# Patient Record
Sex: FEMALE | Race: WHITE | NOT HISPANIC OR LATINO | Employment: FULL TIME | URBAN - METROPOLITAN AREA
[De-identification: names, ages, dates, MRNs, and addresses within clinical notes are randomized per-mention and may not be internally consistent; named-entity substitution may affect disease eponyms.]

---

## 2017-11-13 ENCOUNTER — PATIENT OUTREACH (OUTPATIENT)
Dept: INTERNAL MEDICINE | Facility: CLINIC | Age: 32
End: 2017-11-13

## 2017-11-13 NOTE — PROGRESS NOTES
Dear Flora Edmond,     [facility].  Our records indicate that you are due for an annual checkup with your primary care provider,  Dr. Mery Schaeffer MD.  Please call [phone] to schedule a routine physical exam.  You can also schedule your appointment via your [facility]. You may also be due for the following test and/or procedures:    Health Maintenance Due   Topic Date Due    TETANUS VACCINE  08/13/2005    Pneumococcal PPSV23 (Medium Risk) (1) 08/13/2005    Influenza Vaccine  08/01/2017       You are more than welcome to schedule your visit with your PCP using our [facility].        If you have had any of the above done at another facility, please let us know by calling [phone]; so that we can update your record.  We will add these results to your chart if you fax them to the fax number listed below.  If you have any questions, please call [phone].    Thanks,       Additional Information  If you have questions, you can email [email or web address] or call [phone]  to talk to our [facility] staff. Remember, [facility] is NOT to be used for urgent needs. For medical emergencies, dial 911.

## 2018-02-26 DIAGNOSIS — Z30.09 GENERAL COUNSELING AND ADVICE FOR CONTRACEPTIVE MANAGEMENT: ICD-10-CM

## 2018-02-26 RX ORDER — LEVONORGESTREL AND ETHINYL ESTRADIOL 0.15-0.03
1 KIT ORAL DAILY
Qty: 91 TABLET | Refills: 0 | Status: SHIPPED | OUTPATIENT
Start: 2018-02-26 | End: 2018-05-08 | Stop reason: SDUPTHER

## 2018-03-19 ENCOUNTER — PATIENT OUTREACH (OUTPATIENT)
Dept: ADMINISTRATIVE | Facility: HOSPITAL | Age: 33
End: 2018-03-19

## 2018-03-19 NOTE — PROGRESS NOTES
[facility] is committed to your overall health.  To help you get the most out of each of your visits, we will review your information to make sure you are up to date on all of your recommended tests and/or procedures.       Your PCP  Mery Schaeffer MD   found that you may be due for:       Health Maintenance Due   Topic Date Due    TETANUS VACCINE  08/13/2005    Influenza Vaccine  08/01/2017             If you have had any of the above done at another facility, please bring the records or information with you so that your record at [facility] will be complete.  If you would like to schedule any of these, please contact me.     If you are currently taking medication, please bring it with you to your appointment for review.     Also, if you have any type of Advanced Directives, please bring them with you to your office visit so we may scan them into your chart.       Thank you for Choosing [facility] for your healthcare needs.        Additional Information  If you have questions, you can email [email or web address] or call [phone]  to talk to our [facility] staff. Remember, [facility] is NOT to be used for urgent needs. For medical emergencies, dial 911.

## 2018-03-20 ENCOUNTER — OFFICE VISIT (OUTPATIENT)
Dept: INTERNAL MEDICINE | Facility: CLINIC | Age: 33
End: 2018-03-20
Attending: INTERNAL MEDICINE
Payer: COMMERCIAL

## 2018-03-20 ENCOUNTER — HOSPITAL ENCOUNTER (OUTPATIENT)
Dept: RADIOLOGY | Facility: OTHER | Age: 33
Discharge: HOME | End: 2018-03-20
Attending: INTERNAL MEDICINE
Payer: COMMERCIAL

## 2018-03-20 VITALS
OXYGEN SATURATION: 96 % | WEIGHT: 147.5 LBS | DIASTOLIC BLOOD PRESSURE: 60 MMHG | HEIGHT: 65 IN | SYSTOLIC BLOOD PRESSURE: 121 MMHG | BODY MASS INDEX: 24.57 KG/M2 | HEART RATE: 78 BPM

## 2018-03-20 DIAGNOSIS — M85.80 OSTEOPENIA, UNSPECIFIED LOCATION: ICD-10-CM

## 2018-03-20 DIAGNOSIS — Q76.5 CERVICAL RIB: ICD-10-CM

## 2018-03-20 DIAGNOSIS — J30.2 CHRONIC SEASONAL ALLERGIC RHINITIS, UNSPECIFIED TRIGGER: ICD-10-CM

## 2018-03-20 DIAGNOSIS — Z00.00 ANNUAL PHYSICAL EXAM: Primary | ICD-10-CM

## 2018-03-20 DIAGNOSIS — R20.0 ARM NUMBNESS: ICD-10-CM

## 2018-03-20 DIAGNOSIS — L98.9 SKIN LESION: ICD-10-CM

## 2018-03-20 DIAGNOSIS — Z12.83 SKIN EXAM, SCREENING FOR CANCER: ICD-10-CM

## 2018-03-20 DIAGNOSIS — Z11.3 SCREENING EXAMINATION FOR STD (SEXUALLY TRANSMITTED DISEASE): ICD-10-CM

## 2018-03-20 DIAGNOSIS — R42 VERTIGO: ICD-10-CM

## 2018-03-20 DIAGNOSIS — Z83.2 FAMILY HISTORY OF HEMOPHILIA B: ICD-10-CM

## 2018-03-20 DIAGNOSIS — E55.9 VITAMIN D DEFICIENCY: ICD-10-CM

## 2018-03-20 DIAGNOSIS — Z83.2 FAMILY HISTORY OF BLOOD DISORDER: ICD-10-CM

## 2018-03-20 LAB
B-HCG UR QL: NEGATIVE
CTP QC/QA: YES

## 2018-03-20 PROCEDURE — 72050 X-RAY EXAM NECK SPINE 4/5VWS: CPT | Mod: TC,FY

## 2018-03-20 PROCEDURE — 99395 PREV VISIT EST AGE 18-39: CPT | Mod: S$GLB,,, | Performed by: INTERNAL MEDICINE

## 2018-03-20 PROCEDURE — 87491 CHLMYD TRACH DNA AMP PROBE: CPT

## 2018-03-20 PROCEDURE — 72050 X-RAY EXAM NECK SPINE 4/5VWS: CPT | Mod: 26,,, | Performed by: RADIOLOGY

## 2018-03-20 PROCEDURE — 99999 PR PBB SHADOW E&M-EST. PATIENT-LVL V: CPT | Mod: PBBFAC,,, | Performed by: INTERNAL MEDICINE

## 2018-03-20 PROCEDURE — 81025 URINE PREGNANCY TEST: CPT | Mod: S$GLB,,, | Performed by: INTERNAL MEDICINE

## 2018-03-20 RX ORDER — MECLIZINE HYDROCHLORIDE 25 MG/1
25 TABLET ORAL 3 TIMES DAILY PRN
Qty: 30 TABLET | Refills: 0 | Status: SHIPPED | OUTPATIENT
Start: 2018-03-20 | End: 2022-06-28

## 2018-03-20 RX ORDER — LORATADINE 10 MG/1
10 TABLET ORAL DAILY
Refills: 0 | COMMUNITY
Start: 2018-03-20 | End: 2022-07-14

## 2018-03-20 NOTE — PATIENT INSTRUCTIONS
Over the counter anti-histamine like zyrtec daily along with twice daily nasal saline rinses (oceans nasal saline 3-4 sprays per nostril twice daily) and gently blow nose followed by flonase 1 spray twice daily for 1 month to look for improvement. Flonase takes about 1 week to start working.

## 2018-03-20 NOTE — PROGRESS NOTES
"Subjective:   Patient ID: Flora Edmond is a 32 y.o. female  Chief complaint:   Chief Complaint   Patient presents with    Annual Exam       HPI  Here for annual exam    Reports had episode of vertigo - triggered by rollingng to right side - "room spins" for a few seconds and then is self resolving. Sx Intermittent x 1 day  Looked up eply maneuver and this was helpful   Chronic sinus sx - no ear pain, bilateral pressure   Takes claritin D   No ear pain or pressure. Hx of deafness of right ear since birth    Family hx of hemophilia B - reports both siblings were tested and she is requesting testing for this - denies heavy periods. Does have hx of dry mouth and has gum bleeding at times. No easy bruising.     Hx of cervical rib - right - reports gets numbness of right arm > left  No neck pain.   Reports at times if stretches will feel lightheaded - reports pulse disappears if lifts right arm. Is concerned about thoracic outlet syndrome.   No hx of cyanosis, arm edema or UE clots    Hx of vit d - treated with Rx in past - taking otc intermittently at this time   Bone density checked after volunteered as part of a study from [facility] Jan 2015 showed osteopenia - pt brought report today   T score fem neck was -2  Tscore  L spine - 1.9  On OCPs x 15 years     Review of Systems    Objective:  Vitals:    03/20/18 1314   BP: 121/60   Pulse: 78   SpO2: 96%   Weight: 66.9 kg (147 lb 7.8 oz)   Height: 5' 5" (1.651 m)     Body mass index is 24.54 kg/m².    Physical Exam   Constitutional: She is oriented to person, place, and time. She appears well-developed and well-nourished.   HENT:   Head: Normocephalic and atraumatic.   Right Ear: External ear normal.   Left Ear: External ear normal.   Nose: Nose normal.   Mouth/Throat: Oropharynx is clear and moist. No oropharyngeal exudate.   No carotid bruits   Eyes: Conjunctivae and EOM are normal.   Neck: Neck supple. No thyromegaly present.   Cardiovascular: Normal rate, regular " rhythm, normal heart sounds and intact distal pulses.    Pulmonary/Chest: Effort normal and breath sounds normal.   Abdominal: Soft. Bowel sounds are normal.   Musculoskeletal: She exhibits no edema or tenderness.   No UE cyanosis or edema  Sensation to light touch in tact B UE  No spinal ttp  5/5 strength B UE     Lymphadenopathy:     She has no cervical adenopathy.   Neurological: She is alert and oriented to person, place, and time.   Skin: Skin is warm and dry. Capillary refill takes less than 2 seconds.   Psychiatric: Her behavior is normal. Thought content normal.   Vitals reviewed.      Assessment:  1. Annual physical exam    2. Screening examination for STD (sexually transmitted disease)    3. Vitamin D deficiency    4. Chronic seasonal allergic rhinitis, unspecified trigger    5. Family history of blood disorder    6. Family history of hemophilia B    7. Skin exam, screening for cancer    8. Skin lesion    9. Cervical rib    10. Arm numbness    11. Osteopenia, unspecified location    12. Vertigo        Plan:  Flora was seen today for annual exam.    Diagnoses and all orders for this visit:    Annual physical exam  -     POCT Urine Pregnancy  -     C. trachomatis/N. gonorrhoeae by AMP DNA Urine  -     CBC auto differential; Future  -     Comprehensive metabolic panel; Future  -     TSH; Future  -     RPR; Future  -     HIV-1 and HIV-2 antibodies; Future  -     Vitamin D; Future    Screening examination for STD (sexually transmitted disease)  -     POCT Urine Pregnancy  -     C. trachomatis/N. gonorrhoeae by AMP DNA Urine  -     RPR; Future  -     HIV-1 and HIV-2 antibodies; Future    Vitamin D deficiency    Chronic seasonal allergic rhinitis, unspecified trigger  -     loratadine (CLARITIN) 10 mg tablet; Take 1 tablet (10 mg total) by mouth once daily.    Family history of blood disorder  -     Ambulatory Referral to Hematology / Oncology    Family history of hemophilia B  -     Ambulatory Referral to  Hematology / Oncology    Skin exam, screening for cancer  -     Ambulatory Referral to Dermatology    Skin lesion  -     Ambulatory Referral to Dermatology    Cervical rib  -     Ambulatory consult to Cardiothoracic Surgery    Arm numbness  -     Ambulatory Referral to Neurology  -     Ambulatory consult to Cardiothoracic Surgery  -     X-Ray Cervical Spine Complete 5 view; Future    Osteopenia, unspecified location  -     DXA Bone Density Spine And Hip; Future    Vertigo  -     Ambulatory Referral to ENT    Other orders  -     meclizine (ANTIVERT) 25 mg tablet; Take 1 tablet (25 mg total) by mouth 3 (three) times daily as needed.    Er and rTc prompts     Health Maintenance   Topic Date Due    TETANUS VACCINE  08/13/2005    Pneumococcal PPSV23 (Medium Risk) (1) 08/13/2005    Influenza Vaccine  08/01/2017    Pap Smear with HPV Cotest  12/07/2019    Lipid Panel  Completed

## 2018-03-21 LAB
C TRACH DNA SPEC QL NAA+PROBE: NOT DETECTED
N GONORRHOEA DNA SPEC QL NAA+PROBE: NOT DETECTED

## 2018-03-22 ENCOUNTER — TELEPHONE (OUTPATIENT)
Dept: INTERNAL MEDICINE | Facility: CLINIC | Age: 33
End: 2018-03-22

## 2018-03-22 DIAGNOSIS — E55.9 VITAMIN D DEFICIENCY: Primary | ICD-10-CM

## 2018-03-22 RX ORDER — ACETAMINOPHEN 500 MG
1 TABLET ORAL DAILY
COMMUNITY
Start: 2018-03-22 | End: 2022-07-14

## 2018-03-22 RX ORDER — ERGOCALCIFEROL 1.25 MG/1
50000 CAPSULE ORAL
Qty: 12 CAPSULE | Refills: 0 | Status: SHIPPED | OUTPATIENT
Start: 2018-03-22 | End: 2018-05-08

## 2018-03-27 ENCOUNTER — HOSPITAL ENCOUNTER (OUTPATIENT)
Dept: RADIOLOGY | Facility: OTHER | Age: 33
Discharge: HOME | End: 2018-03-27
Attending: INTERNAL MEDICINE
Payer: COMMERCIAL

## 2018-03-27 DIAGNOSIS — M85.80 OSTEOPENIA, UNSPECIFIED LOCATION: ICD-10-CM

## 2018-03-27 PROCEDURE — 77080 DXA BONE DENSITY AXIAL: CPT | Mod: 26,,, | Performed by: RADIOLOGY

## 2018-03-27 PROCEDURE — 77080 DXA BONE DENSITY AXIAL: CPT | Mod: TC

## 2018-04-09 ENCOUNTER — TELEPHONE (OUTPATIENT)
Dept: INTERNAL MEDICINE | Facility: CLINIC | Age: 33
End: 2018-04-09

## 2018-05-08 ENCOUNTER — OFFICE VISIT (OUTPATIENT)
Dept: OBSTETRICS AND GYNECOLOGY | Facility: CLINIC | Age: 33
End: 2018-05-08
Payer: COMMERCIAL

## 2018-05-08 ENCOUNTER — TELEPHONE (OUTPATIENT)
Dept: UROGYNECOLOGY | Facility: CLINIC | Age: 33
End: 2018-05-08

## 2018-05-08 VITALS
WEIGHT: 147.44 LBS | DIASTOLIC BLOOD PRESSURE: 70 MMHG | BODY MASS INDEX: 24.57 KG/M2 | HEIGHT: 65 IN | SYSTOLIC BLOOD PRESSURE: 110 MMHG

## 2018-05-08 DIAGNOSIS — Z72.0 TOBACCO ABUSE: ICD-10-CM

## 2018-05-08 DIAGNOSIS — Z30.09 GENERAL COUNSELING AND ADVICE FOR CONTRACEPTIVE MANAGEMENT: ICD-10-CM

## 2018-05-08 DIAGNOSIS — K64.9 HEMORRHOIDS, UNSPECIFIED HEMORRHOID TYPE: ICD-10-CM

## 2018-05-08 DIAGNOSIS — Z12.4 ENCOUNTER FOR PAPANICOLAOU SMEAR FOR CERVICAL CANCER SCREENING: ICD-10-CM

## 2018-05-08 DIAGNOSIS — Z71.6 ENCOUNTER FOR SMOKING CESSATION COUNSELING: ICD-10-CM

## 2018-05-08 DIAGNOSIS — Z11.3 SCREEN FOR STD (SEXUALLY TRANSMITTED DISEASE): ICD-10-CM

## 2018-05-08 DIAGNOSIS — N32.81 OVERACTIVE BLADDER: ICD-10-CM

## 2018-05-08 DIAGNOSIS — Z01.419 WOMEN'S ANNUAL ROUTINE GYNECOLOGICAL EXAMINATION: Primary | ICD-10-CM

## 2018-05-08 DIAGNOSIS — B00.9 HSV-1 (HERPES SIMPLEX VIRUS 1) INFECTION: ICD-10-CM

## 2018-05-08 DIAGNOSIS — Z11.51 SCREENING FOR HPV (HUMAN PAPILLOMAVIRUS): ICD-10-CM

## 2018-05-08 LAB
CANDIDA RRNA VAG QL PROBE: NEGATIVE
G VAGINALIS RRNA GENITAL QL PROBE: NEGATIVE
T VAGINALIS RRNA GENITAL QL PROBE: NEGATIVE

## 2018-05-08 PROCEDURE — 88175 CYTOPATH C/V AUTO FLUID REDO: CPT

## 2018-05-08 PROCEDURE — 99395 PREV VISIT EST AGE 18-39: CPT | Mod: S$GLB,,, | Performed by: NURSE PRACTITIONER

## 2018-05-08 PROCEDURE — 87480 CANDIDA DNA DIR PROBE: CPT

## 2018-05-08 PROCEDURE — 87086 URINE CULTURE/COLONY COUNT: CPT

## 2018-05-08 PROCEDURE — 87510 GARDNER VAG DNA DIR PROBE: CPT

## 2018-05-08 PROCEDURE — 99999 PR PBB SHADOW E&M-EST. PATIENT-LVL III: CPT | Mod: PBBFAC,,, | Performed by: NURSE PRACTITIONER

## 2018-05-08 PROCEDURE — 87624 HPV HI-RISK TYP POOLED RSLT: CPT

## 2018-05-08 RX ORDER — LEVONORGESTREL AND ETHINYL ESTRADIOL 0.15-0.03
1 KIT ORAL DAILY
Qty: 91 TABLET | Refills: 3 | Status: SHIPPED | OUTPATIENT
Start: 2018-05-08 | End: 2018-08-06

## 2018-05-08 RX ORDER — HYDROCORTISONE ACETATE 25 MG/1
25 SUPPOSITORY RECTAL 2 TIMES DAILY
Qty: 6 SUPPOSITORY | Refills: 1 | Status: SHIPPED | OUTPATIENT
Start: 2018-05-08 | End: 2019-05-08

## 2018-05-08 RX ORDER — VALACYCLOVIR HYDROCHLORIDE 1 G/1
1000 TABLET, FILM COATED ORAL DAILY
Qty: 30 TABLET | Refills: 11 | Status: SHIPPED | OUTPATIENT
Start: 2018-05-08 | End: 2022-08-10 | Stop reason: SDUPTHER

## 2018-05-08 NOTE — TELEPHONE ENCOUNTER
----- Message from Danielle Dominique MA sent at 5/8/2018  9:39 AM CDT -----            Name of Who is Calling: Flora Edmond      What is the request in detail: Pt is needing a appt for overactive bladder      Can the clinic reply by [facility]: No      What Number to Call Back if not in [facility]: [phone]

## 2018-05-08 NOTE — TELEPHONE ENCOUNTER
Spoke with pt and scheduled a consult appointment on 5/17/18, appointment letter placed in the mail, pt voiced understanding and call was ended.

## 2018-05-08 NOTE — PROGRESS NOTES
CC: Annual  HPI: Pt is a 32 y.o.  female who presents for routine annual exam. She works at [employer] for [employer].  She uses OCPs for contraception. Pt is a smoker.  She has no plans to quit. She does want STD screening- Affirm only  She recently had STD screening per PCP. Pt reports overactive bladder for the past 1 year. She was seen in Urgent Care several months ago and was negative for UTI.  Denies any frequency, dysuria, back pain, or fever. Pt is also c/o hemorrhoids.  States she has had them since ae 23.  Pt is requesting refill for Valtrex for HSV1. Reports + family history of breast cancer- mother (age 72 at diagnosis)- mother had triple negative for genetic screening. Reports + family history of prostate cancer- father, paternal grandfather, and paternal uncle.    .     ROS:  GENERAL: Feeling well overall. Denies fever or chills.   SKIN: Denies rash or lesions.   HEAD: Denies head injury or headache.   NODES: Denies enlarged lymph nodes.   CHEST: Denies chest pain or shortness of breath.   CARDIOVASCULAR: Denies palpitations or left sided chest pain.   ABDOMEN: No abdominal pain, constipation, diarrhea, nausea, vomiting or rectal bleeding.   URINARY: No dysuria, hematuria, or burning on urination.  REPRODUCTIVE: See HPI.   BREASTS: Denies pain, lumps, or nipple discharge.   HEMATOLOGIC: No easy bruisability or excessive bleeding.   MUSCULOSKELETAL: Denies joint pain or swelling.   NEUROLOGIC: Denies syncope or weakness.   PSYCHIATRIC: Denies depression, anxiety or mood swings.    PE:   APPEARANCE: Well nourished, well developed, White female in no acute distress.  NODES: no cervical, supraclavicular, or inguinal lymphadenopathy  BREASTS: Symmetrical, no skin changes or visible lesions. No palpable masses, nipple discharge or adenopathy bilaterally.  ABDOMEN: Soft. No tenderness or masses. No distention. No hernias palpated. No CVA tenderness.  VULVA: No lesions. Normal external female  genitalia.  URETHRAL MEATUS: Normal size and location, no lesions, no prolapse.  URETHRA: No masses, tenderness, or prolapse.  VAGINA: Moist. No lesions or lacerations noted. No abnormal discharge present. No odor present.   CERVIX: No lesions or discharge. No cervical motion tenderness.   UTERUS: Normal size, regular shape, mobile, non-tender.  ADNEXA: No tenderness. No fullness or masses palpated in the adnexal regions.   ANUS PERINEUM: Normal.      Diagnosis:  1. Women's annual routine gynecological examination    2. Hemorrhoids, unspecified hemorrhoid type    3. Screen for STD (sexually transmitted disease)    4. General counseling and advice for contraceptive management    5. Encounter for Papanicolaou smear for cervical cancer screening    6. Screening for HPV (human papillomavirus)    7. HSV-1 (herpes simplex virus 1) infection    8. Tobacco abuse    9. Encounter for smoking cessation counseling        Plan:   Pap/ HPV  Affirm  OCPs refilled  Discussed smoking cessation, plan for quit date.  Pt not currently ready to quit.  Discussed smoking increased risk of thromboembolic events while taking OCPs.  Discussed after age 35-if is still smoking OCPs will be contraindicated.   Valtrex refilled  Anusol suppositories for hemorrhoids.  Discussed Witch Hazel PRN  Referral to UroGyn for evaluation of overactive bladder  Urine Culture    Orders Placed This Encounter    HPV High Risk Genotypes, PCR    Vaginosis Screen by DNA Probe    Liquid-based pap smear, screening    levonorgestrel-ethinyl estradiol (SETLAKIN) 0.15 mg-30 mcg per tablet    hydrocortisone (ANUSOL-HC) 25 mg suppository    valACYclovir (VALTREX) 1000 MG tablet       Patient was counseled today on the new ACS guidelines for cervical cytology screening as well as the current recommendations for breast cancer screening. She was counseled to follow up with her PCP for other routine health maintenance. Counseling session lasted approximately 10  minutes, and all her questions were answered.    Follow-up with me in 1 year for routine exam

## 2018-05-09 LAB — BACTERIA UR CULT: NORMAL

## 2018-05-11 LAB
HPV16 AG SPEC QL: NEGATIVE
HPV16+18+H RISK 12 DNA CVX-IMP: NEGATIVE
HPV18 DNA SPEC QL NAA+PROBE: NEGATIVE

## 2018-05-14 ENCOUNTER — PATIENT MESSAGE (OUTPATIENT)
Dept: OBSTETRICS AND GYNECOLOGY | Facility: CLINIC | Age: 33
End: 2018-05-14

## 2018-06-17 ENCOUNTER — PATIENT MESSAGE (OUTPATIENT)
Dept: OBSTETRICS AND GYNECOLOGY | Facility: CLINIC | Age: 33
End: 2018-06-17

## 2018-09-07 ENCOUNTER — OFFICE VISIT (OUTPATIENT)
Dept: OBSTETRICS AND GYNECOLOGY | Facility: CLINIC | Age: 33
End: 2018-09-07
Payer: COMMERCIAL

## 2018-09-07 VITALS
HEIGHT: 65 IN | DIASTOLIC BLOOD PRESSURE: 75 MMHG | SYSTOLIC BLOOD PRESSURE: 118 MMHG | WEIGHT: 147.5 LBS | BODY MASS INDEX: 24.57 KG/M2

## 2018-09-07 DIAGNOSIS — N92.6 IRREGULAR BLEEDING: Primary | ICD-10-CM

## 2018-09-07 LAB
B-HCG UR QL: NEGATIVE
CTP QC/QA: YES

## 2018-09-07 PROCEDURE — 99214 OFFICE O/P EST MOD 30 MIN: CPT | Mod: S$GLB,,, | Performed by: OBSTETRICS & GYNECOLOGY

## 2018-09-07 PROCEDURE — 87480 CANDIDA DNA DIR PROBE: CPT

## 2018-09-07 PROCEDURE — 87510 GARDNER VAG DNA DIR PROBE: CPT

## 2018-09-07 PROCEDURE — 99999 PR PBB SHADOW E&M-EST. PATIENT-LVL III: CPT | Mod: PBBFAC,,, | Performed by: OBSTETRICS & GYNECOLOGY

## 2018-09-07 PROCEDURE — 81025 URINE PREGNANCY TEST: CPT | Mod: S$GLB,,, | Performed by: OBSTETRICS & GYNECOLOGY

## 2018-09-07 PROCEDURE — 87491 CHLMYD TRACH DNA AMP PROBE: CPT

## 2018-09-07 PROCEDURE — 3008F BODY MASS INDEX DOCD: CPT | Mod: CPTII,S$GLB,, | Performed by: OBSTETRICS & GYNECOLOGY

## 2018-09-07 NOTE — PROGRESS NOTES
Gynecology    SUBJECTIVE:     Chief Complaint: Menorrhagia (had cramps in lower pelvic area)       History of Present Illness:  33 year old with irregular bleeding.  Has been on seasonale for about 10 years.  Takes three months at a time.  Had some bleeding between periods in June/July during the hormone pills.  She would bleed twice a week for 6-8 weeks.  Some days she had spotting, but other days she required a light tampon.  Also had cramping during that time as well.  Then, she had her period during the placebo week in August, but this cycle was very heavy.  She had three days of using 5-6 tampons per day- for her the heaviest cycle that she's had.  She had some cramping in her rectum during this time.  Started her pills again after this.  Bleeding has since resolved, except with masturbation and orgasm after which she has spotting. Only clitoral stimulation for masturbation, nothing internal.  Has not had intercourse since the unusual bleeding, but is scared to because she feels like she might bleed again.        Review of Systems:  Review of Systems   Constitutional: Negative for appetite change, fever and unexpected weight change.   Respiratory: Negative for shortness of breath.    Cardiovascular: Negative for chest pain.   Gastrointestinal: Negative for nausea and vomiting.   Genitourinary: Positive for menstrual problem. Negative for menorrhagia, pelvic pain, vaginal bleeding, vaginal discharge and vaginal pain.        OBJECTIVE:     Physical Exam:  Physical Exam   Constitutional: She is oriented to person, place, and time. She appears well-developed and well-nourished.   Pulmonary/Chest: Effort normal.   Abdominal: Soft.   Genitourinary: Vagina normal and uterus normal. No labial fusion. There is no rash, tenderness, lesion or injury on the right labia. There is no rash, tenderness, lesion or injury on the left labia. Uterus is not deviated, not enlarged, not fixed and not tender. Cervix exhibits discharge  and friability. Cervix exhibits no motion tenderness. Right adnexum displays no mass, no tenderness and no fullness. Left adnexum displays no mass, no tenderness and no fullness. No erythema, tenderness or bleeding in the vagina. No foreign body in the vagina. No signs of injury around the vagina. No vaginal discharge found.   Genitourinary Comments: Urethra: normal appearing urethra with no masses, tenderness or lesions  Urethral meatus: normal size, anterior vaginal wall with no prolapse, no lesions    Cervix- somewhat friable on exam; prominent ectropion   Neurological: She is alert and oriented to person, place, and time.   Psychiatric: She has a normal mood and affect. Her behavior is normal. Judgment and thought content normal.   Nursing note and vitals reviewed.      Chaperoned by: hesham    ASSESSMENT:       ICD-10-CM ICD-9-CM    1. Irregular bleeding N92.6 626.4 POCT urine pregnancy      C. trachomatis/N. gonorrhoeae by AMP DNA      Vaginosis Screen by DNA Probe          Plan:      Flora was seen today for menorrhagia.    Diagnoses and all orders for this visit:    Irregular bleeding  -     POCT urine pregnancy  -     C. trachomatis/N. gonorrhoeae by AMP DNA  -     Vaginosis Screen by DNA Probe    - vaginal screen and gc/ct today; patient does have some discharge on exam; prominent ectropion;   - consider treatment for cervical friability; wait results of above tests  - if becomes a longterm problem, consider uterine polyp or change of OCP    Orders Placed This Encounter   Procedures    C. trachomatis/N. gonorrhoeae by AMP DNA    Vaginosis Screen by DNA Probe    POCT urine pregnancy       Follow-up for annual or prn.    Yaima Khalil

## 2018-09-08 LAB
C TRACH DNA SPEC QL NAA+PROBE: NOT DETECTED
N GONORRHOEA DNA SPEC QL NAA+PROBE: NOT DETECTED

## 2018-09-11 ENCOUNTER — PATIENT MESSAGE (OUTPATIENT)
Dept: OBSTETRICS AND GYNECOLOGY | Facility: CLINIC | Age: 33
End: 2018-09-11

## 2019-04-12 ENCOUNTER — PATIENT MESSAGE (OUTPATIENT)
Dept: INTERNAL MEDICINE | Facility: CLINIC | Age: 34
End: 2019-04-12

## 2019-04-15 RX ORDER — DOXYCYCLINE HYCLATE 100 MG
100 TABLET ORAL DAILY
Qty: 45 TABLET | Refills: 0 | Status: SHIPPED | OUTPATIENT
Start: 2019-04-15 | End: 2019-05-30

## 2019-06-26 ENCOUNTER — OFFICE VISIT (OUTPATIENT)
Dept: OBSTETRICS AND GYNECOLOGY | Facility: CLINIC | Age: 34
End: 2019-06-26
Attending: OBSTETRICS & GYNECOLOGY
Payer: COMMERCIAL

## 2019-06-26 ENCOUNTER — LAB VISIT (OUTPATIENT)
Dept: LAB | Facility: OTHER | Age: 34
End: 2019-06-26
Attending: OBSTETRICS & GYNECOLOGY
Payer: COMMERCIAL

## 2019-06-26 ENCOUNTER — PATIENT MESSAGE (OUTPATIENT)
Dept: OBSTETRICS AND GYNECOLOGY | Facility: CLINIC | Age: 34
End: 2019-06-26

## 2019-06-26 VITALS
WEIGHT: 146.63 LBS | DIASTOLIC BLOOD PRESSURE: 82 MMHG | BODY MASS INDEX: 24.43 KG/M2 | SYSTOLIC BLOOD PRESSURE: 120 MMHG | HEIGHT: 65 IN

## 2019-06-26 DIAGNOSIS — N90.89 VULVAR LESION: ICD-10-CM

## 2019-06-26 DIAGNOSIS — N92.6 IRREGULAR PERIODS/MENSTRUAL CYCLES: Primary | ICD-10-CM

## 2019-06-26 PROCEDURE — 99999 PR PBB SHADOW E&M-EST. PATIENT-LVL III: ICD-10-PCS | Mod: PBBFAC,,, | Performed by: OBSTETRICS & GYNECOLOGY

## 2019-06-26 PROCEDURE — 3008F PR BODY MASS INDEX (BMI) DOCUMENTED: ICD-10-PCS | Mod: CPTII,S$GLB,, | Performed by: OBSTETRICS & GYNECOLOGY

## 2019-06-26 PROCEDURE — 3008F BODY MASS INDEX DOCD: CPT | Mod: CPTII,S$GLB,, | Performed by: OBSTETRICS & GYNECOLOGY

## 2019-06-26 PROCEDURE — 36415 COLL VENOUS BLD VENIPUNCTURE: CPT

## 2019-06-26 PROCEDURE — 99999 PR PBB SHADOW E&M-EST. PATIENT-LVL III: CPT | Mod: PBBFAC,,, | Performed by: OBSTETRICS & GYNECOLOGY

## 2019-06-26 PROCEDURE — 99213 OFFICE O/P EST LOW 20 MIN: CPT | Mod: S$GLB,,, | Performed by: OBSTETRICS & GYNECOLOGY

## 2019-06-26 PROCEDURE — 86696 HERPES SIMPLEX TYPE 2 TEST: CPT

## 2019-06-26 PROCEDURE — 99213 PR OFFICE/OUTPT VISIT, EST, LEVL III, 20-29 MIN: ICD-10-PCS | Mod: S$GLB,,, | Performed by: OBSTETRICS & GYNECOLOGY

## 2019-06-26 RX ORDER — DICLOFENAC SODIUM AND MISOPROSTOL 75; 200 MG/1; UG/1
1 TABLET, DELAYED RELEASE ORAL DAILY
Qty: 2 TABLET | Refills: 0 | Status: SHIPPED | OUTPATIENT
Start: 2019-06-26 | End: 2019-08-06

## 2019-06-26 NOTE — PROGRESS NOTES
HISTORY OF PRESENT ILLNESS:    Flora Edmond is a 33 y.o. female, , No LMP recorded. (Menstrual status: Birth Control).,  presents for irregular cycles.    Started OCPs age 16 for irregular cycles and contraception.  Started seasonale in her 20s to skip her cycle - been on that for years.  About a year ago started having breakthrough bleeding over the course of the 3 mos, especially if she missed a pill.   Also starts bleeding if she has an orgasm.  Cycles about the same - lasting 4 days, normal flow.      Previously smoked cigarettes, switched to electronic Juul about a year ago.    3/19 TSH normal.    Patient with history of HSV-1 on genital swab .  No other outbreaks.  Wants to check blood work.    Past Medical History:   Diagnosis Date    Cervical rib     Vitamin D deficiency        Past Surgical History:   Procedure Laterality Date    WISDOM TOOTH EXTRACTION         MEDICATIONS AND ALLERGIES:      Current Outpatient Medications:     cholecalciferol, vitamin D3, (VITAMIN D3) 2,000 unit Cap, Take 1 capsule (2,000 Units total) by mouth once daily., Disp: , Rfl:     INTROVALE 0.15 mg-30 mcg per tablet, TAKE 1 TABLET BY MOUTH EVERY DAY, Disp: 91 tablet, Rfl: 0    loratadine (CLARITIN) 10 mg tablet, Take 1 tablet (10 mg total) by mouth once daily., Disp: , Rfl: 0    meclizine (ANTIVERT) 25 mg tablet, Take 1 tablet (25 mg total) by mouth 3 (three) times daily as needed., Disp: 30 tablet, Rfl: 0    valACYclovir (VALTREX) 1000 MG tablet, Take 1 tablet (1,000 mg total) by mouth once daily., Disp: 30 tablet, Rfl: 11    diclofenac-misoprostol  mg-mcg (ARTHROTEC 75)  mg-mcg per tablet, Take 1 tablet by mouth once daily. Take 1st dose at 8pm the night before the procedure, 2nd dose the morning of the procedure for 2 days, Disp: 2 tablet, Rfl: 0    Review of patient's allergies indicates:  No Known Allergies    Family History   Problem Relation Age of Onset    Thyroid disease Mother      Breast cancer Mother     Hyperlipidemia Father     Coronary artery disease Father     No Known Problems Sister     Breast cancer Maternal Grandmother     Prostate cancer Paternal Uncle     Colon cancer Neg Hx     Ovarian cancer Neg Hx        Social History     Socioeconomic History    Marital status: Single     Spouse name: Not on file    Number of children: Not on file    Years of education: Not on file    Highest education level: Not on file   Occupational History    Not on file   Social Needs    Financial resource strain: Not on file    Food insecurity:     Worry: Not on file     Inability: Not on file    Transportation needs:     Medical: Not on file     Non-medical: Not on file   Tobacco Use    Smoking status: Current Every Day Smoker     Packs/day: 0.50     Years: 15.00     Pack years: 7.50     Types: Cigarettes    Smokeless tobacco: Never Used   Substance and Sexual Activity    Alcohol use: Yes     Comment: social    Drug use: No    Sexual activity: Yes     Partners: Male     Birth control/protection: OCP   Lifestyle    Physical activity:     Days per week: Not on file     Minutes per session: Not on file    Stress: Not on file   Relationships    Social connections:     Talks on phone: Not on file     Gets together: Not on file     Attends Sikhism service: Not on file     Active member of club or organization: Not on file     Attends meetings of clubs or organizations: Not on file     Relationship status: Not on file   Other Topics Concern    Not on file   Social History Narrative    Graduated with masters in public health in May 2015    Currently seeking employment    Working at [email or web address] as  in [employer]    Originally from [place]    Moved to [facility] in [place], lives alone           ROS:  GENERAL: No weight changes. No swelling. No fatigue. No fever.  CARDIOVASCULAR: No chest pain. No shortness of breath. No leg cramps.   NEUROLOGICAL: No headaches. No vision  "changes.  BREASTS: No pain. No lumps. No discharge.  ABDOMEN: No pain. No nausea. No vomiting. No diarrhea. No constipation.  REPRODUCTIVE: see above  VULVA: No pain. No lesions. No itching.  VAGINA: No relaxation. No itching. No odor. No discharge. No lesions.  URINARY: No incontinence. No nocturia. No frequency. No dysuria.    /82 (BP Location: Left arm, Patient Position: Sitting, BP Method: Medium (Manual))   Ht 5' 5" (1.651 m)   Wt 66.5 kg (146 lb 9.7 oz)   BMI 24.40 kg/m²     PE:  APPEARANCE: Well nourished, well developed, in no acute distress.  PSYCH:  Appropriate mood and affect  EXTREMITIES: No edema      DIAGNOSIS & PLAN  1. Irregular periods/menstrual cycles  US Pelvis Complete Non OB   2. Vulvar lesion  Herpes Simplex Virus (HSV) Types 1 & 2, IgG, Herpes Titer       COUNSELING:  Discussed contraceptive options.  Patient counseled - will check mirena coverage.  Patient will check gardasil coverage  "

## 2019-06-28 LAB
HSV1 IGG SERPL QL IA: POSITIVE
HSV2 IGG SERPL QL IA: NEGATIVE

## 2019-07-01 ENCOUNTER — CLINICAL SUPPORT (OUTPATIENT)
Dept: OBSTETRICS AND GYNECOLOGY | Facility: CLINIC | Age: 34
End: 2019-07-01
Payer: COMMERCIAL

## 2019-07-01 ENCOUNTER — HOSPITAL ENCOUNTER (OUTPATIENT)
Dept: RADIOLOGY | Facility: OTHER | Age: 34
Discharge: HOME | End: 2019-07-01
Attending: OBSTETRICS & GYNECOLOGY
Payer: COMMERCIAL

## 2019-07-01 DIAGNOSIS — N92.6 IRREGULAR PERIODS/MENSTRUAL CYCLES: ICD-10-CM

## 2019-07-01 DIAGNOSIS — Z23 NEED FOR HPV VACCINATION: Primary | ICD-10-CM

## 2019-07-01 PROCEDURE — 90471 HPV VACCINE 9-VALENT 3 DOSE IM: ICD-10-PCS | Mod: S$GLB,,, | Performed by: OBSTETRICS & GYNECOLOGY

## 2019-07-01 PROCEDURE — 90471 IMMUNIZATION ADMIN: CPT | Mod: S$GLB,,, | Performed by: OBSTETRICS & GYNECOLOGY

## 2019-07-01 PROCEDURE — 76830 US PELVIS COMP WITH TRANSVAG NON-OB (XPD): ICD-10-PCS | Mod: 26,,, | Performed by: RADIOLOGY

## 2019-07-01 PROCEDURE — 76856 US PELVIS COMP WITH TRANSVAG NON-OB (XPD): ICD-10-PCS | Mod: 26,,, | Performed by: RADIOLOGY

## 2019-07-01 PROCEDURE — 90651 HPV VACCINE 9-VALENT 3 DOSE IM: ICD-10-PCS | Mod: S$GLB,,, | Performed by: OBSTETRICS & GYNECOLOGY

## 2019-07-01 PROCEDURE — 90651 9VHPV VACCINE 2/3 DOSE IM: CPT | Mod: S$GLB,,, | Performed by: OBSTETRICS & GYNECOLOGY

## 2019-07-01 PROCEDURE — 76830 TRANSVAGINAL US NON-OB: CPT | Mod: 26,,, | Performed by: RADIOLOGY

## 2019-07-01 PROCEDURE — 76856 US EXAM PELVIC COMPLETE: CPT | Mod: 26,,, | Performed by: RADIOLOGY

## 2019-07-01 PROCEDURE — 76830 TRANSVAGINAL US NON-OB: CPT | Mod: TC

## 2019-07-03 ENCOUNTER — PATIENT MESSAGE (OUTPATIENT)
Dept: OBSTETRICS AND GYNECOLOGY | Facility: CLINIC | Age: 34
End: 2019-07-03

## 2019-07-08 ENCOUNTER — TELEPHONE (OUTPATIENT)
Dept: OBSTETRICS AND GYNECOLOGY | Facility: CLINIC | Age: 34
End: 2019-07-08

## 2019-07-08 ENCOUNTER — PATIENT MESSAGE (OUTPATIENT)
Dept: OBSTETRICS AND GYNECOLOGY | Facility: CLINIC | Age: 34
End: 2019-07-08

## 2019-07-08 DIAGNOSIS — N92.6 IRREGULAR PERIODS/MENSTRUAL CYCLES: Primary | ICD-10-CM

## 2019-07-10 ENCOUNTER — TELEPHONE (OUTPATIENT)
Dept: OBSTETRICS AND GYNECOLOGY | Facility: CLINIC | Age: 34
End: 2019-07-10

## 2019-07-10 NOTE — PROGRESS NOTES
Late entry HPV 1st injection given, patient tolerated well, no complaints before or after injection.

## 2019-07-18 ENCOUNTER — PATIENT MESSAGE (OUTPATIENT)
Dept: OBSTETRICS AND GYNECOLOGY | Facility: CLINIC | Age: 34
End: 2019-07-18

## 2019-07-23 ENCOUNTER — OFFICE VISIT (OUTPATIENT)
Dept: OTOLARYNGOLOGY | Facility: CLINIC | Age: 34
End: 2019-07-23
Payer: COMMERCIAL

## 2019-07-23 VITALS
SYSTOLIC BLOOD PRESSURE: 129 MMHG | WEIGHT: 145.5 LBS | DIASTOLIC BLOOD PRESSURE: 81 MMHG | BODY MASS INDEX: 24.24 KG/M2 | HEART RATE: 65 BPM | HEIGHT: 65 IN

## 2019-07-23 DIAGNOSIS — J31.0 CHRONIC RHINITIS: Primary | ICD-10-CM

## 2019-07-23 DIAGNOSIS — J34.2 DEVIATED NASAL SEPTUM: ICD-10-CM

## 2019-07-23 DIAGNOSIS — J33.8 POLYPOID MIDDLE TURBINATE: ICD-10-CM

## 2019-07-23 PROCEDURE — 99204 PR OFFICE/OUTPT VISIT, NEW, LEVL IV, 45-59 MIN: ICD-10-PCS | Mod: 25,S$GLB,, | Performed by: OTOLARYNGOLOGY

## 2019-07-23 PROCEDURE — 31231 NASAL/SINUS ENDOSCOPY: ICD-10-PCS | Mod: S$GLB,,, | Performed by: OTOLARYNGOLOGY

## 2019-07-23 PROCEDURE — 3008F BODY MASS INDEX DOCD: CPT | Mod: CPTII,S$GLB,, | Performed by: OTOLARYNGOLOGY

## 2019-07-23 PROCEDURE — 99999 PR PBB SHADOW E&M-EST. PATIENT-LVL IV: CPT | Mod: PBBFAC,,, | Performed by: OTOLARYNGOLOGY

## 2019-07-23 PROCEDURE — 99999 PR PBB SHADOW E&M-EST. PATIENT-LVL IV: ICD-10-PCS | Mod: PBBFAC,,, | Performed by: OTOLARYNGOLOGY

## 2019-07-23 PROCEDURE — 99204 OFFICE O/P NEW MOD 45 MIN: CPT | Mod: 25,S$GLB,, | Performed by: OTOLARYNGOLOGY

## 2019-07-23 PROCEDURE — 31231 NASAL ENDOSCOPY DX: CPT | Mod: S$GLB,,, | Performed by: OTOLARYNGOLOGY

## 2019-07-23 PROCEDURE — 3008F PR BODY MASS INDEX (BMI) DOCUMENTED: ICD-10-PCS | Mod: CPTII,S$GLB,, | Performed by: OTOLARYNGOLOGY

## 2019-07-23 RX ORDER — AZELASTINE 1 MG/ML
1 SPRAY, METERED NASAL 2 TIMES DAILY
Qty: 30 ML | Refills: 2 | Status: SHIPPED | OUTPATIENT
Start: 2019-07-23 | End: 2022-07-14

## 2019-07-23 RX ORDER — FLUTICASONE PROPIONATE 50 MCG
2 SPRAY, SUSPENSION (ML) NASAL DAILY
Qty: 16 G | Refills: 2 | Status: SHIPPED | OUTPATIENT
Start: 2019-07-23 | End: 2019-10-21

## 2019-07-23 NOTE — PROCEDURES
Nasal/sinus endoscopy  Date/Time: 7/23/2019 3:35 PM  Performed by: Shayne Lincoln MD  Authorized by: Shayne Lincoln MD     Consent Done?:  Yes (Verbal)  Anesthesia:     Local anesthetic:  Lidocaine 4% and Iván-Synephrine 1/2%    Patient tolerance:  Patient tolerated the procedure well with no immediate complications  Nose:     Procedure Performed:  Nasal Endoscopy  External:      No external nasal deformity  Intranasal:      Mucosa no polyps     Mucosa ulcers not present     No mucosa lesions present     Enlarged turbinates     Septum gross deformity  Nasopharynx:      No mucosa lesions     Adenoids not present     Posterior choanae patent     Eustachian tube patent     Polypoid change of bilateral middle turbinates.  Clear mucoid exudate anteriorly.  No petra polyposis or purulence.

## 2019-07-23 NOTE — PATIENT INSTRUCTIONS
Get Pramod Med Sinus Rinse from the pharmacy.  Use it daily according to the instructions.  It will help to cut down the inflammation in your nose and sinuses.

## 2019-07-23 NOTE — PROGRESS NOTES
Subjective:      Flora Edmond is a 33 y.o. female who was self-referred for nasal congestion.    She reports a 10+ year history of allergic rhinitis that involves constant nasal drainage, PND, frequent and frequent sneezing. She states that her symptoms have seemed to worsen recently. She also reports symptoms of nasal obstruction, reduced ability to taste and smell, and intermittent facial pain/pressure. She reports her symptoms are particularly severe when flying in an airplane, specifically during ascent/descent. She denies frequent sinus infections requiring antibiotics or steroids.     Current sinonasal medications include Claritin.  She has used Flonase and Nasonex in the past.  The last course of antibiotics was a long time ago.  She does use nasal decongestant sprays but not on a regular basis.    She recalls previously having allergy testing, which was reportedly positive. For dust, dustmites, and cats.     She denies a history of asthma.    She relates a history of reflux symptoms which is managed with over-the-counter medication as needed  She has not previously had an EGD.    She denies have a diagnosis of obstructive sleep apnea.     She has not had sinonasal surgery.    She does not recall a prior history of nasal trauma.    SNOT-22 score = 29, NOSE score = 45%, ETDQ-7 score = 1.7    Global QOL = deferred      Past Medical History  She has a past medical history of Cervical rib and Vitamin D deficiency.    Past Surgical History  She has a past surgical history that includes Drewsey tooth extraction (2007).    Family History  Her family history includes Breast cancer in her maternal grandmother and mother; Coronary artery disease in her father; Hyperlipidemia in her father; No Known Problems in her sister; Prostate cancer in her paternal uncle; Thyroid disease in her mother.    Social History  She reports that she has been smoking cigarettes.  She has a 7.50 pack-year smoking history. She has never  "used smokeless tobacco. She reports that she drinks alcohol. She reports that she does not use drugs.    Allergies  She has No Known Allergies.    Medications   She has a current medication list which includes the following prescription(s): cholecalciferol (vitamin d3), introvale, meclizine, azelastine, diclofenac-misoprostol  mg-mcg, fluticasone propionate, loratadine, and valacyclovir.    Review of Systems  Review of Systems   Constitutional: Negative for fatigue, fever and unexpected weight change.   HENT: Positive for congestion, hearing loss and sinus pressure. Negative for dental problem, ear discharge, ear pain, facial swelling, nosebleeds, postnasal drip, rhinorrhea, sore throat, tinnitus, trouble swallowing and voice change.    Eyes: Positive for itching. Negative for photophobia, discharge and visual disturbance.   Respiratory: Positive for cough. Negative for apnea, shortness of breath and wheezing.    Cardiovascular: Negative for chest pain and palpitations.   Gastrointestinal: Negative for abdominal pain, nausea and vomiting.   Endocrine: Negative for cold intolerance and heat intolerance.   Genitourinary: Negative for difficulty urinating.   Musculoskeletal: Negative for arthralgias, back pain, myalgias and neck pain.   Skin: Negative for rash.   Allergic/Immunologic: Positive for environmental allergies. Negative for food allergies.   Neurological: Negative for dizziness, seizures, syncope, weakness and headaches.   Hematological: Negative for adenopathy. Does not bruise/bleed easily.   Psychiatric/Behavioral: Negative for decreased concentration, dysphoric mood and sleep disturbance. The patient is not nervous/anxious.           Objective:     /81   Pulse 65   Ht 5' 5" (1.651 m)   Wt 66 kg (145 lb 8.1 oz)   BMI 24.21 kg/m²        Constitutional:   She appears well-developed and well-nourished.     Ears:  Right ear hearing normal to normal and whispered voice; external ear normal " without scars, lesions, or masses; ear canal, tympanic membrane, and middle ear normal. and left ear hearing normal to normal and whispered voice; external ear normal without scars, lesions, or masses; ear canal, tympanic membrane, and middle ear normal..   Right Ear: Tympanic membrane is not perforated and not retracted.   Left Ear: Tympanic membrane is not perforated and not retracted.     Nose:  Rhinorrhea present. No septal deviation. No turbinate hypertrophy.  Right sinus exhibits no maxillary sinus tenderness and no frontal sinus tenderness. Left sinus exhibits no maxillary sinus tenderness and no frontal sinus tenderness.     Mouth/Throat  Oropharynx clear and moist without lesions or asymmetry and normal uvula midline. +1.  Tonsils not present.           Procedure    Nasal endoscopy performed.  See procedure note.     Left nasal valve     Left MT polypoid change     Left choana and ET     Right nasal valve     Right MT polypoid change     Right ET        Data Reviewed    WBC (K/uL)   Date Value   03/20/2018 7.07     Eosinophil% (%)   Date Value   03/20/2018 2.5     Eos # (K/uL)   Date Value   03/20/2018 0.2     Platelets (K/uL)   Date Value   03/20/2018 384 (H)     Glucose (mg/dL)   Date Value   03/20/2018 66 (L)     No results found for: IGE    No sinus imaging available.       Assessment:     1. Chronic rhinitis    2. Polypoid middle turbinate    3. Deviated nasal septum         Plan:     I had a long discussion with the patient regarding her condition and the further workup and management options.  She has evidence of marked inhalant allergies, without petra sinonasal polyposis.  I prescribed the daily use of azelastine spray to treat her nasal allergy, to be used in conjunction with fluticasone spray.  I prescribed the daily use of isotonic saline sinus irrigation in a Pramod Med bottle.  I also recommended evaluation by a medical allergist, including sensitivity testing, for which I placed a  referral.    Follow up if symptoms worsen or fail to improve.

## 2019-07-31 ENCOUNTER — PATIENT MESSAGE (OUTPATIENT)
Dept: OBSTETRICS AND GYNECOLOGY | Facility: CLINIC | Age: 34
End: 2019-07-31

## 2019-08-06 ENCOUNTER — PROCEDURE VISIT (OUTPATIENT)
Dept: OBSTETRICS AND GYNECOLOGY | Facility: CLINIC | Age: 34
End: 2019-08-06
Payer: COMMERCIAL

## 2019-08-06 VITALS
DIASTOLIC BLOOD PRESSURE: 82 MMHG | SYSTOLIC BLOOD PRESSURE: 128 MMHG | WEIGHT: 144.38 LBS | BODY MASS INDEX: 24.06 KG/M2 | HEIGHT: 65 IN

## 2019-08-06 DIAGNOSIS — Z30.430 ENCOUNTER FOR IUD INSERTION: Primary | ICD-10-CM

## 2019-08-06 LAB
B-HCG UR QL: NEGATIVE
CTP QC/QA: YES

## 2019-08-06 PROCEDURE — 81025 URINE PREGNANCY TEST: CPT | Mod: S$GLB,,, | Performed by: OBSTETRICS & GYNECOLOGY

## 2019-08-06 PROCEDURE — 58300 INSERT INTRAUTERINE DEVICE: CPT | Mod: S$GLB,,, | Performed by: OBSTETRICS & GYNECOLOGY

## 2019-08-06 PROCEDURE — 81025 POCT URINE PREGNANCY: ICD-10-PCS | Mod: S$GLB,,, | Performed by: OBSTETRICS & GYNECOLOGY

## 2019-08-06 PROCEDURE — 58300 INSERTION OF IUD: ICD-10-PCS | Mod: S$GLB,,, | Performed by: OBSTETRICS & GYNECOLOGY

## 2019-08-06 NOTE — PROCEDURES
Insertion of IUD  Date/Time: 8/6/2019 2:01 PM  Performed by: Bentley Penn Jr., MD  Authorized by: Bentley Penn Jr., MD     Consent:     Consent obtained:  Written    Consent given by:  Patient    Procedure risks and benefits discussed: yes      Patient questions answered: yes      Patient agrees, verbalizes understanding, and wants to proceed: yes      Educational handouts given: yes      Instructions and paperwork completed: yes    Procedure:     Pelvic exam performed: yes      Negative GC/chlamydia test: no      Negative urine pregnancy test: yes      Negative serum pregnancy test: no      Cervix cleaned and prepped: no      Speculum placed in vagina: yes      Tenaculum applied to cervix: yes      Uterus sounded: yes      Uterus sound depth (cm):  7    IUD inserted with no complications: yes      Strings trimmed: yes    Post-procedure:     Patient tolerated procedure well: yes      Patient will follow up after next period: no

## 2019-08-08 ENCOUNTER — TELEPHONE (OUTPATIENT)
Dept: OBSTETRICS AND GYNECOLOGY | Facility: CLINIC | Age: 34
End: 2019-08-08

## 2019-08-08 DIAGNOSIS — Z30.09 COUNSELING FOR BIRTH CONTROL REGARDING INTRAUTERINE DEVICE (IUD): Primary | ICD-10-CM

## 2019-08-08 RX ORDER — MISOPROSTOL 200 UG/1
200 TABLET ORAL ONCE
Qty: 2 TABLET | Refills: 0 | Status: SHIPPED | OUTPATIENT
Start: 2019-08-08 | End: 2022-07-14

## 2019-08-15 ENCOUNTER — PATIENT MESSAGE (OUTPATIENT)
Dept: OBSTETRICS AND GYNECOLOGY | Facility: CLINIC | Age: 34
End: 2019-08-15

## 2019-08-16 RX ORDER — FLUCONAZOLE 150 MG/1
150 TABLET ORAL DAILY
Qty: 1 TABLET | Refills: 1 | Status: SHIPPED | OUTPATIENT
Start: 2019-08-16 | End: 2019-08-17

## 2019-09-19 ENCOUNTER — PATIENT MESSAGE (OUTPATIENT)
Dept: OBSTETRICS AND GYNECOLOGY | Facility: CLINIC | Age: 34
End: 2019-09-19

## 2019-10-01 ENCOUNTER — PATIENT MESSAGE (OUTPATIENT)
Dept: OBSTETRICS AND GYNECOLOGY | Facility: CLINIC | Age: 34
End: 2019-10-01

## 2019-10-02 ENCOUNTER — PATIENT MESSAGE (OUTPATIENT)
Dept: INTERNAL MEDICINE | Facility: CLINIC | Age: 34
End: 2019-10-02

## 2019-10-02 ENCOUNTER — TELEPHONE (OUTPATIENT)
Dept: OBSTETRICS AND GYNECOLOGY | Facility: CLINIC | Age: 34
End: 2019-10-02

## 2019-10-09 ENCOUNTER — OFFICE VISIT (OUTPATIENT)
Dept: INTERNAL MEDICINE | Facility: CLINIC | Age: 34
End: 2019-10-09
Attending: FAMILY MEDICINE
Payer: COMMERCIAL

## 2019-10-09 ENCOUNTER — CLINICAL SUPPORT (OUTPATIENT)
Dept: OBSTETRICS AND GYNECOLOGY | Facility: CLINIC | Age: 34
End: 2019-10-09
Payer: COMMERCIAL

## 2019-10-09 VITALS
SYSTOLIC BLOOD PRESSURE: 114 MMHG | HEART RATE: 84 BPM | BODY MASS INDEX: 23.44 KG/M2 | OXYGEN SATURATION: 98 % | DIASTOLIC BLOOD PRESSURE: 74 MMHG | WEIGHT: 140.88 LBS

## 2019-10-09 VITALS
HEIGHT: 65 IN | SYSTOLIC BLOOD PRESSURE: 106 MMHG | DIASTOLIC BLOOD PRESSURE: 60 MMHG | WEIGHT: 138.88 LBS | BODY MASS INDEX: 23.14 KG/M2

## 2019-10-09 DIAGNOSIS — Z11.3 ENCOUNTER FOR SCREENING EXAMINATION FOR SEXUALLY TRANSMITTED DISEASE: Primary | ICD-10-CM

## 2019-10-09 DIAGNOSIS — R35.0 URINARY FREQUENCY: ICD-10-CM

## 2019-10-09 DIAGNOSIS — H57.89 IRRITATION OF LEFT EYE: ICD-10-CM

## 2019-10-09 DIAGNOSIS — R35.0 URINARY FREQUENCY: Primary | ICD-10-CM

## 2019-10-09 PROCEDURE — 87491 CHLMYD TRACH DNA AMP PROBE: CPT

## 2019-10-09 PROCEDURE — 87086 URINE CULTURE/COLONY COUNT: CPT

## 2019-10-09 PROCEDURE — 3008F PR BODY MASS INDEX (BMI) DOCUMENTED: ICD-10-PCS | Mod: CPTII,S$GLB,, | Performed by: FAMILY MEDICINE

## 2019-10-09 PROCEDURE — 99214 PR OFFICE/OUTPT VISIT, EST, LEVL IV, 30-39 MIN: ICD-10-PCS | Mod: S$GLB,,, | Performed by: FAMILY MEDICINE

## 2019-10-09 PROCEDURE — 99213 PR OFFICE/OUTPT VISIT, EST, LEVL III, 20-29 MIN: ICD-10-PCS | Mod: S$GLB,,, | Performed by: NURSE PRACTITIONER

## 2019-10-09 PROCEDURE — 99213 OFFICE O/P EST LOW 20 MIN: CPT | Mod: S$GLB,,, | Performed by: NURSE PRACTITIONER

## 2019-10-09 PROCEDURE — 3008F BODY MASS INDEX DOCD: CPT | Mod: CPTII,S$GLB,, | Performed by: FAMILY MEDICINE

## 2019-10-09 PROCEDURE — 99999 PR PBB SHADOW E&M-EST. PATIENT-LVL III: ICD-10-PCS | Mod: PBBFAC,,, | Performed by: FAMILY MEDICINE

## 2019-10-09 PROCEDURE — 99214 OFFICE O/P EST MOD 30 MIN: CPT | Mod: S$GLB,,, | Performed by: FAMILY MEDICINE

## 2019-10-09 PROCEDURE — 99999 PR PBB SHADOW E&M-EST. PATIENT-LVL III: CPT | Mod: PBBFAC,,, | Performed by: FAMILY MEDICINE

## 2019-10-09 PROCEDURE — 99999 PR PBB SHADOW E&M-EST. PATIENT-LVL III: ICD-10-PCS | Mod: PBBFAC,,, | Performed by: NURSE PRACTITIONER

## 2019-10-09 PROCEDURE — 87661 TRICHOMONAS VAGINALIS AMPLIF: CPT

## 2019-10-09 PROCEDURE — 99999 PR PBB SHADOW E&M-EST. PATIENT-LVL III: CPT | Mod: PBBFAC,,, | Performed by: NURSE PRACTITIONER

## 2019-10-09 RX ORDER — SULFACETAMIDE SODIUM 100 MG/ML
1 SOLUTION/ DROPS OPHTHALMIC EVERY 4 HOURS
Qty: 5 ML | Refills: 0 | Status: SHIPPED | OUTPATIENT
Start: 2019-10-09 | End: 2019-10-14

## 2019-10-09 NOTE — PROGRESS NOTES
CC: std testing    History of Present Illness: Flora Edmond is a 34 y.o. female that presents today 10/9/2019   [facility]. Pt was referred by PCP because she keeps having urinary frequency despite antibiotic treatment. Pt reports that last week she took 2 days of macrobid and didn't feel relief. She then had a friend call her in cipro, which she has now completed and is still having urinary frequency. PCP recommended that she get STD testing to rule this out. PCP sent for urine culture today and is holding off on any other antibiotics until culture comes back. No other complaints or concerns noted.       Past Medical History:   Diagnosis Date    Cervical rib     Vitamin D deficiency        Past Surgical History:   Procedure Laterality Date    WISDOM TOOTH EXTRACTION  2007       Current Outpatient Medications   Medication Sig Dispense Refill    azelastine (ASTELIN) 137 mcg (0.1 %) nasal spray 1 spray (137 mcg total) by Nasal route 2 (two) times daily. 30 mL 2    cholecalciferol, vitamin D3, (VITAMIN D3) 2,000 unit Cap Take 1 capsule (2,000 Units total) by mouth once daily.      fluticasone propionate (FLONASE) 50 mcg/actuation nasal spray 2 sprays (100 mcg total) by Each Nare route once daily. 16 g 2    loratadine (CLARITIN) 10 mg tablet Take 1 tablet (10 mg total) by mouth once daily.  0    meclizine (ANTIVERT) 25 mg tablet Take 1 tablet (25 mg total) by mouth 3 (three) times daily as needed. 30 tablet 0    miSOPROStol (CYTOTEC) 200 MCG Tab Take 1 tablet (200 mcg total) by mouth once. Take 1 tab the PM before the procedure, then take 1 tab the AM of the procedure for 1 dose (Patient not taking: Reported on 10/9/2019) 2 tablet 0    sulfacetamide sodium 10% (BLEPH-10) 10 % ophthalmic solution Place 1 drop into the left eye every 4 (four) hours. for 5 days 5 mL 0    valACYclovir (VALTREX) 1000 MG tablet Take 1 tablet (1,000 mg total) by mouth once  "daily. 30 tablet 11     Current Facility-Administered Medications   Medication Dose Route Frequency Provider Last Rate Last Dose    levonorgestrel 20 mcg/24 hours (5 yrs) 52 mg IUD 1 Intra Uterine Device  1 Intra Uterine Device Intrauterine  Bentley Penn Jr., MD   1 Intra Uterine Device at 19 1401       Review of patient's allergies indicates:  No Known Allergies    Family History   Problem Relation Age of Onset    Thyroid disease Mother     Breast cancer Mother     Hyperlipidemia Father     Coronary artery disease Father     No Known Problems Sister     Breast cancer Maternal Grandmother     Prostate cancer Paternal Uncle     Colon cancer Neg Hx     Ovarian cancer Neg Hx        Social History     Tobacco Use    Smoking status: Current Every Day Smoker     Packs/day: 0.50     Years: 15.00     Pack years: 7.50     Types: Cigarettes    Smokeless tobacco: Never Used   Substance Use Topics    Alcohol use: Yes     Comment: social    Drug use: No       OB History    Para Term  AB Living   0 0 0 0 0 0   SAB TAB Ectopic Multiple Live Births   0 0 0 0         Review of Symptoms:  GENERAL: Denies weight gain or weight loss. Feeling well overall.   SKIN: Denies rash or lesions.   HEAD: Denies head injury or headache.   NODES: Denies enlarged lymph nodes.   CHEST: Denies chest pain or shortness of breath.   CARDIOVASCULAR: Denies palpitations or left sided chest pain.   ABDOMEN: No abdominal pain, constipation, diarrhea, nausea, vomiting or rectal bleeding.   URINARY: + frequency, dysuria, hematuria, or burning on urination.    /60   Ht 5' 5" (1.651 m)   Wt 63 kg (138 lb 14.2 oz)   Physical Exam:  APPEARANCE: Well nourished, well developed, in no acute distress.  SKIN: Normal skin turgor, no lesions.  NECK: Neck symmetric without masses   RESPIRATORY: Normal respiratory effort with no retractions or use of accessory muscles  ABDOMEN: Soft. No tenderness or masses. No " hepatosplenomegaly. No hernias.  PELVIC: Normal external female genitalia without lesions. Normal hair distribution. Adequate perineal body, normal urethral meatus. Urethra with no masses.  Bladder nontender. Vagina moist and well rugated without lesions or discharge. Cervix pink and without lesions. No significant cystocele or rectocele.     ASSESSMENT/PLAN:  Encounter for screening examination for sexually transmitted disease  -     Vaginosis Screen by DNA Probe  -     C. trachomatis/N. gonorrhoeae by AMP DNA [facility]; Cervicovaginal    Urinary frequency  Comments:  PCP following this with urine culture      -UTI precautions:  Wipe front to back and avoid constipation.  Avoid caffeine.  Drink lots of water  Void every 3-4 hrs.  Expel urine from vagina post void  No dryer sheets or harsh detergents with the undergarments  No bubble baths  Void before and after intercourse  Avoid hot tub use  Void soon after urge arises  Avoid tight fitting clothes and panty hose  Cranberry supplement    Follow-up:  Will f/u with results  RTC if symptoms worsen or do not improve  RTC as needed

## 2019-10-09 NOTE — PROGRESS NOTES
CHIEF COMPLAINT:  Urinary frequency and left eye discomfort    HISTORY OF PRESENT ILLNESS: The patient is a generally healthy 34 year-old white female.  The patient has a 10 day history of urinary frequency.  Regrettably someone called in antibiotics for her about 5 days ago.  She completed a 3 day course about 48 hr ago.  Urinalysis in the office today is unremarkable.  I will send a culture.  I explained to her the possibility of a false negative due to the antibiotics.  No culture was obtained prior to initiating the antibiotics.  She is not really any better.  She has distantly concerned about an STI.    She has had some left eye irritation for a couple of days.  She just woke up with it.  No significant exudate or pain.    REVIEW OF SYSTEMS:  GENERAL: No fever, chills, fatigability or weight loss.  SKIN: No rashes, itching or changes in color or texture of skin.  HEAD: No headaches or recent head trauma.  EYES: Visual acuity fine. No photophobia or diplopia.  EARS: Denies ear pain, discharge or vertigo.  NOSE: No loss of smell, no epistaxis or postnasal drip.  MOUTH & THROAT: No hoarseness or change in voice. No excessive gum bleeding.  NODES: Denies swollen glands.  CHEST: Denies PENA, cyanosis, wheezing, cough and sputum production.  CARDIOVASCULAR: Denies chest pain, PND, orthopnea or reduced exercise tolerance.  ABDOMEN: Appetite fine. No weight loss. Denies diarrhea, abdominal pain, hematemesis or blood in stool.  URINARY: No flank pain, dysuria or hematuria.  PERIPHERAL VASCULAR: No claudication or cyanosis.  MUSCULOSKELETAL: No joint stiffness or swelling. Denies back pain.  NEUROLOGIC: No history of seizures, paralysis, alteration of gait or coordination.    SOCIAL HISTORY: The patient does not smoke.  The patient consumes alcohol socially.  The patient is single.    PHYSICAL EXAMINATION:   Blood pressure 114/74, pulse 84, weight 63.9 kg (140 lb 14 oz), SpO2 98 %.    APPEARANCE: Well nourished, well  developed, in no acute distress.    HEAD: Normocephalic, atraumatic.  EYES: PERRL. EOMI.  Conjunctivae anicteric there is some injection of the left  EARS: TM's intact. Light reflex normal. No retraction or perforation.    NOSE: Mucosa pink. Airway clear.  MOUTH & THROAT: No tonsillar enlargement. No pharyngeal erythema or exudate. No stridor.  NECK: Supple.   NODES: No cervical, axillary or inguinal lymph node enlargement.  ABDOMEN: Bowel sounds normal. Not distended. Soft. No tenderness or masses.  No ascites is noted.  MUSCULOSKELETAL:  There is no clubbing, cyanosis, or edema of the extremities x4.  There is full range of motion of the lumbar spine.  There is full range of motion of the extremities x4.  There is no deformity noted.    NEUROLOGIC:       Normal speech development.      Hearing normal.      Normal gait.      Motor and sensory exams grossly normal.  PSYCHIATRIC: Patient is alert and oriented x3.  Thought processes are all normal.  There is no homicidality.  There is no suicidality.  There is no evidence of psychosis.    LABORATORY/RADIOLOGY:   Chart reviewed.  We will send a urine culture today.    ASSESSMENT:   Urinary frequency  Left eye irritation    PLAN:  Follow up urine culture although it may be false negative.  Patient is aware of this.  She [facility] to evaluate for any STI concerns  Sulfacetamide q.i.d. for 5 days

## 2019-10-10 ENCOUNTER — PATIENT MESSAGE (OUTPATIENT)
Dept: OBSTETRICS AND GYNECOLOGY | Facility: CLINIC | Age: 34
End: 2019-10-10

## 2019-10-10 LAB
BACTERIA UR CULT: NO GROWTH
C TRACH DNA SPEC QL NAA+PROBE: NOT DETECTED
N GONORRHOEA DNA SPEC QL NAA+PROBE: NOT DETECTED

## 2019-11-06 ENCOUNTER — PATIENT MESSAGE (OUTPATIENT)
Dept: INTERNAL MEDICINE | Facility: CLINIC | Age: 34
End: 2019-11-06

## 2019-11-06 RX ORDER — ATOVAQUONE AND PROGUANIL HYDROCHLORIDE 250; 100 MG/1; MG/1
1 TABLET, FILM COATED ORAL DAILY
Qty: 30 TABLET | Refills: 0 | Status: SHIPPED | OUTPATIENT
Start: 2019-11-06 | End: 2022-07-14

## 2020-03-03 ENCOUNTER — PATIENT MESSAGE (OUTPATIENT)
Dept: INTERNAL MEDICINE | Facility: CLINIC | Age: 35
End: 2020-03-03

## 2020-03-03 DIAGNOSIS — Z12.83 SKIN EXAM, SCREENING FOR CANCER: Primary | ICD-10-CM

## 2020-03-03 DIAGNOSIS — Q76.5 CERVICAL RIB: ICD-10-CM

## 2020-03-03 DIAGNOSIS — R20.0 ARM NUMBNESS: ICD-10-CM

## 2020-03-03 NOTE — TELEPHONE ENCOUNTER
Referral to CTS and derm signed     - pt was also referred to neuro for arm numbness with plan to f/u with neuro AFTER CTS evaluation if their evaluation was unremarkable   - please schedule neuro appt 2- 4 weeks after CTS

## 2020-03-04 ENCOUNTER — PATIENT OUTREACH (OUTPATIENT)
Dept: ADMINISTRATIVE | Facility: OTHER | Age: 35
End: 2020-03-04

## 2020-03-04 ENCOUNTER — OFFICE VISIT (OUTPATIENT)
Dept: DERMATOLOGY | Facility: CLINIC | Age: 35
End: 2020-03-04
Payer: COMMERCIAL

## 2020-03-04 DIAGNOSIS — D22.9 MULTIPLE BENIGN NEVI: ICD-10-CM

## 2020-03-04 DIAGNOSIS — Z12.83 SCREENING EXAM FOR SKIN CANCER: Primary | ICD-10-CM

## 2020-03-04 DIAGNOSIS — L85.8 KERATOSIS PILARIS: ICD-10-CM

## 2020-03-04 DIAGNOSIS — Z12.83 SKIN EXAM, SCREENING FOR CANCER: ICD-10-CM

## 2020-03-04 PROCEDURE — 99203 PR OFFICE/OUTPT VISIT, NEW, LEVL III, 30-44 MIN: ICD-10-PCS | Mod: S$GLB,,, | Performed by: DERMATOLOGY

## 2020-03-04 PROCEDURE — 99999 PR PBB SHADOW E&M-EST. PATIENT-LVL II: ICD-10-PCS | Mod: PBBFAC,,, | Performed by: DERMATOLOGY

## 2020-03-04 PROCEDURE — 99999 PR PBB SHADOW E&M-EST. PATIENT-LVL II: CPT | Mod: PBBFAC,,, | Performed by: DERMATOLOGY

## 2020-03-04 PROCEDURE — 99203 OFFICE O/P NEW LOW 30 MIN: CPT | Mod: S$GLB,,, | Performed by: DERMATOLOGY

## 2020-03-04 RX ORDER — EMTRICITABINE AND TENOFOVIR DISOPROXIL FUMARATE 200; 300 MG/1; MG/1
TABLET, FILM COATED ORAL
COMMUNITY
Start: 2020-02-28 | End: 2022-07-14

## 2020-03-04 RX ORDER — DOLUTEGRAVIR SODIUM 50 MG/1
TABLET, FILM COATED ORAL
COMMUNITY
Start: 2020-02-28 | End: 2022-07-14

## 2020-03-04 NOTE — PROGRESS NOTES
Subjective:       Patient ID:  Flora Edmond is a 34 y.o. female who presents for   Chief Complaint   Patient presents with    Skin Check     TBSE     33 yo female present today for a TBSE. Also has dry skin on the upper arms and legs - uses coconut oil for this.  Was seen for skin check 5 years ago and was advised to have 2 moles removed but could not bc she no longer had insurance.  The patient denies any moles or growths of the skin that are rapidly growing, hurting, itching, bleeding, or changing colors.      Review of Systems   Skin: Positive for activity-related sunscreen use. Negative for daily sunscreen use, recent sunburn and wears hat.   Hematologic/Lymphatic: Bruises/bleeds easily.        Objective:    Physical Exam   Constitutional: She appears well-developed and well-nourished. No distress.   Neurological: She is alert and oriented to person, place, and time. She is not disoriented.   Psychiatric: She has a normal mood and affect.   Skin:   Areas Examined (abnormalities noted in diagram):   Scalp / Hair Palpated and Inspected  Head / Face Inspection Performed  Neck Inspection Performed  Chest / Axilla Inspection Performed  Abdomen Inspection Performed  Genitals / Buttocks / Groin Inspection Performed  Back Inspection Performed  RUE Inspected  LUE Inspection Performed  RLE Inspected  LLE Inspection Performed  Nails and Digits Inspection Performed              Diagram Legend     Erythematous scaling macule/papule c/w actinic keratosis       Vascular papule c/w angioma      Pigmented verrucoid papule/plaque c/w seborrheic keratosis      Yellow umbilicated papule c/w sebaceous hyperplasia      Irregularly shaped tan macule c/w lentigo     1-2 mm smooth white papules consistent with Milia      Movable subcutaneous cyst with punctum c/w epidermal inclusion cyst      Subcutaneous movable cyst c/w pilar cyst      Firm pink to brown papule c/w dermatofibroma      Pedunculated fleshy papule(s) c/w skin  tag(s)      Evenly pigmented macule c/w junctional nevus     Mildly variegated pigmented, slightly irregular-bordered macule c/w mildly atypical nevus      Flesh colored to evenly pigmented papule c/w intradermal nevus       Pink pearly papule/plaque c/w basal cell carcinoma      Erythematous hyperkeratotic cursted plaque c/w SCC      Surgical scar with no sign of skin cancer recurrence      Open and closed comedones      Inflammatory papules and pustules      Verrucoid papule consistent consistent with wart     Erythematous eczematous patches and plaques     Dystrophic onycholytic nail with subungual debris c/w onychomycosis     Umbilicated papule    Erythematous-base heme-crusted tan verrucoid plaque consistent with inflamed seborrheic keratosis     Erythematous Silvery Scaling Plaque c/w Psoriasis     See annotation      Assessment / Plan:        Screening exam for skin cancer  Total body skin examination performed today including at least 12 points as noted in physical examination. No lesions suspicious for malignancy noted.    Multiple benign nevi  Discussed ABCDE's of nevi.  Monitor for new mole or moles that are becoming bigger, darker, irritated, or developing irregular borders. Brochure provided.    Keratosis pilaris  Apply Am Lactin lotion or cream to arms and hands nightly. Available over-the counter.    Patient instructed in importance in daily sun protection of at least spf 30. Sun avoidance and topical protection discussed.         Patient encouraged to wear hat for all outdoor exposure.                  Follow up if symptoms worsen or fail to improve, for for TBSE.

## 2020-03-04 NOTE — LETTER
March 4, 2020      Mery Schaeffer MD  [address]  [facility] [address]           [facility]  [address]  [place] [address]  Phone: [phone]  Fax: [phone]          Patient: Flora Edmond   MR Number: [id number]   YOB: 1985   Date of Visit: 3/4/2020       Dear Dr. Mery Schaeffer:    Thank you for referring Flora Edmond to me for evaluation. Attached you will find relevant portions of my assessment and plan of care.    If you have questions, please do not hesitate to call me. I look forward to following Flora Edmond along with you.    Sincerely,    Miranda Hinson MD    Enclosure  CC:  No Recipients    If you would like to receive this communication electronically, please contact [email or web address] or [phone] to request more information on [employer] Link access.    For providers and/or their staff who would like to refer a patient to [facility], please contact us through our one-stop-shop provider referral line, [facility], at [phone].    If you feel you have received this communication in error or would no longer like to receive these types of communications, please e-mail [email or web address]

## 2020-03-19 ENCOUNTER — OCCUPATIONAL HEALTH (OUTPATIENT)
Dept: URGENT CARE | Facility: CLINIC | Age: 35
End: 2020-03-19

## 2020-03-19 VITALS — TEMPERATURE: 98 F

## 2020-03-19 DIAGNOSIS — Z02.1 PRE-EMPLOYMENT EXAMINATION: Primary | ICD-10-CM

## 2020-03-19 PROCEDURE — 99080 SPECIAL REPORTS OR FORMS: CPT | Mod: S$GLB,,, | Performed by: NURSE PRACTITIONER

## 2020-03-19 PROCEDURE — 99080 OSHA QUESTIONNAIRE: ICD-10-PCS | Mod: S$GLB,,, | Performed by: NURSE PRACTITIONER

## 2020-03-19 PROCEDURE — 99002 DEVICE HANDLING PHYS/QHP: CPT | Mod: S$GLB,,, | Performed by: NURSE PRACTITIONER

## 2020-03-19 PROCEDURE — 99002 MASK FIT-QUALITATIVE: ICD-10-PCS | Mod: S$GLB,,, | Performed by: NURSE PRACTITIONER

## 2020-04-16 ENCOUNTER — TELEPHONE (OUTPATIENT)
Dept: NEUROLOGY | Facility: CLINIC | Age: 35
End: 2020-04-16

## 2021-04-28 ENCOUNTER — PATIENT MESSAGE (OUTPATIENT)
Dept: RESEARCH | Facility: HOSPITAL | Age: 36
End: 2021-04-28

## 2022-06-09 NOTE — TELEPHONE ENCOUNTER
Message sent to pt via my chart with lab results and updates to plan.     Schedule vit d level in 3 months   Spoke to [place], [place]. See orders.

## 2022-06-23 ENCOUNTER — OFFICE VISIT (OUTPATIENT)
Dept: PRIMARY CARE CLINIC | Facility: CLINIC | Age: 37
End: 2022-06-23
Payer: COMMERCIAL

## 2022-06-23 DIAGNOSIS — M54.50 ACUTE BILATERAL LOW BACK PAIN WITHOUT SCIATICA: Primary | ICD-10-CM

## 2022-06-23 PROCEDURE — 99213 PR OFFICE/OUTPT VISIT, EST, LEVL III, 20-29 MIN: ICD-10-PCS | Mod: 95,,, | Performed by: NURSE PRACTITIONER

## 2022-06-23 PROCEDURE — 99213 OFFICE O/P EST LOW 20 MIN: CPT | Mod: 95,,, | Performed by: NURSE PRACTITIONER

## 2022-06-23 RX ORDER — DICLOFENAC SODIUM 75 MG/1
75 TABLET, DELAYED RELEASE ORAL 2 TIMES DAILY PRN
Qty: 20 TABLET | Refills: 0 | Status: SHIPPED | OUTPATIENT
Start: 2022-06-23 | End: 2023-06-23

## 2022-06-23 RX ORDER — TIZANIDINE 4 MG/1
4 TABLET ORAL EVERY 6 HOURS PRN
Qty: 20 TABLET | Refills: 0 | Status: SHIPPED | OUTPATIENT
Start: 2022-06-23 | End: 2022-07-03

## 2022-06-23 NOTE — PROGRESS NOTES
The patient location is: [place]   The chief complaint leading to consultation is: Low back pain.     Visit type: audiovisual    Face to Face time with patient: 15 minutes  20 minutes of total time spent on the encounter, which includes face to face time and non-face to face time preparing to see the patient (eg, review of tests), Obtaining and/or reviewing separately obtained history, Documenting clinical information in the electronic or other health record, Independently interpreting results (not separately reported) and communicating results to the patient/family/caregiver, or Care coordination (not separately reported).     Each patient to whom he or she provides medical services by telemedicine is:  (1) informed of the relationship between the physician and patient and the respective role of any other health care provider with respect to management of the patient; and (2) notified that he or she may decline to receive medical services by telemedicine and may withdraw from such care at any time.    Notes:   Subjective:       Patient ID: Flora Edmond is a 36 y.o. female.    Chief Complaint: Low-back Pain    She is new to me.      Low-back Pain  This is a new problem. The current episode started more than 1 month ago. The problem occurs constantly. The problem has been waxing and waning. Associated symptoms include myalgias. Pertinent negatives include no numbness, urinary symptoms, vomiting or weakness. The symptoms are aggravated by twisting (immobility and sitting). She has tried NSAIDs and heat (Epson salt baths) for the symptoms. The treatment provided mild relief.      Past Medical History:   Diagnosis Date    Allergy     seasonal    Cervical rib     Vitamin D deficiency       Past Surgical History:   Procedure Laterality Date    WISDOM TOOTH EXTRACTION  2007      Family History   Problem Relation Age of Onset    Thyroid disease Mother     Breast cancer Mother     Hyperlipidemia Father      Coronary artery disease Father     No Known Problems Sister     Breast cancer Maternal Grandmother     Prostate cancer Paternal Uncle     Colon cancer Neg Hx     Ovarian cancer Neg Hx       Review of patient's allergies indicates:  No Known Allergies  Review of Systems   Gastrointestinal: Negative for vomiting.   Musculoskeletal: Positive for myalgias.   Neurological: Negative for weakness and numbness.       Objective:      There were no vitals filed for this visit.   Physical Exam  Constitutional:       General: She is not in acute distress.     Appearance: Normal appearance.   Neurological:      Mental Status: She is alert and oriented to person, place, and time.   Psychiatric:         Mood and Affect: Mood normal.         Behavior: Behavior normal.       Limited physical examination due to nature of virtual/telemedicine visit.   Lab Results   Component Value Date    WBC 7.07 03/20/2018    HGB 12.5 03/20/2018    HCT 39.9 03/20/2018     (H) 03/20/2018    CHOL 174 08/12/2015    TRIG 130 08/12/2015    HDL 59 08/12/2015    ALT 27 03/20/2018    AST 23 03/20/2018     03/20/2018    K 4.0 03/20/2018     03/20/2018    CREATININE 0.7 03/20/2018    BUN 7 03/20/2018    CO2 22 (L) 03/20/2018    TSH 1.267 03/20/2018      Assessment:       1. Acute bilateral low back pain without sciatica        Plan:       Acute bilateral low back pain without sciatica  -     tiZANidine (ZANAFLEX) 4 MG tablet; Take 1 tablet (4 mg total) by mouth every 6 (six) hours as needed (muscle spasm).  Dispense: 20 tablet; Refill: 0  -     Ambulatory referral/consult to Physical/Occupational Therapy; Future; Expected date: 06/23/2022  -     diclofenac (VOLTAREN) 75 MG EC tablet; Take 1 tablet (75 mg total) by mouth 2 (two) times daily as needed (pain). Take with meal  Dispense: 20 tablet; Refill: 0      Medication List with Changes/Refills   New Medications    DICLOFENAC (VOLTAREN) 75 MG EC TABLET    Take 1 tablet (75 mg total) by  mouth 2 (two) times daily as needed (pain). Take with meal    TIZANIDINE (ZANAFLEX) 4 MG TABLET    Take 1 tablet (4 mg total) by mouth every 6 (six) hours as needed (muscle spasm).   Current Medications    ATOVAQUONE-PROGUANIL (MALARONE) 250-100 MG TAB    Take 1 tablet by mouth once daily. Start 1-2 days before exposure. Stop med 7 days after exposure    AZELASTINE (ASTELIN) 137 MCG (0.1 %) NASAL SPRAY    1 spray (137 mcg total) by Nasal route 2 (two) times daily.    CHOLECALCIFEROL, VITAMIN D3, (VITAMIN D3) 2,000 UNIT CAP    Take 1 capsule (2,000 Units total) by mouth once daily.    LORATADINE (CLARITIN) 10 MG TABLET    Take 1 tablet (10 mg total) by mouth once daily.    MECLIZINE (ANTIVERT) 25 MG TABLET    Take 1 tablet (25 mg total) by mouth 3 (three) times daily as needed.    MISOPROSTOL (CYTOTEC) 200 MCG TAB    Take 1 tablet (200 mcg total) by mouth once. Take 1 tab the PM before the procedure, then take 1 tab the AM of the procedure for 1 dose    TIVICAY 50 MG TAB        TRUVADA 200-300 MG TAB        VALACYCLOVIR (VALTREX) 1000 MG TABLET    Take 1 tablet (1,000 mg total) by mouth once daily.       Follow up if symptoms worsen or fail to improve.     Shreya Angel, DNP, APRN, FNP-C

## 2022-06-28 ENCOUNTER — CLINICAL SUPPORT (OUTPATIENT)
Dept: REHABILITATION | Facility: HOSPITAL | Age: 37
End: 2022-06-28
Payer: COMMERCIAL

## 2022-06-28 DIAGNOSIS — M54.50 ACUTE BILATERAL LOW BACK PAIN WITHOUT SCIATICA: ICD-10-CM

## 2022-06-28 PROCEDURE — 97161 PT EVAL LOW COMPLEX 20 MIN: CPT | Mod: 97

## 2022-06-28 PROCEDURE — 97140 MANUAL THERAPY 1/> REGIONS: CPT

## 2022-06-30 NOTE — PLAN OF CARE
[facility]  Physical Therapy Initial Evaluation    Name: Flora Edmond  Clinic Number: [id number]    Therapy Diagnosis:   Encounter Diagnosis   Name Primary?    Acute bilateral low back pain without sciatica      Physician: Shreya Angel,*    Physician Orders: PT Eval and Treat  Medical Diagnosis from Referral: Acute bilateral low back pain without sciatica [M54.50]  Evaluation Date: 6/28/2022  Authorization Period Expiration: 6/23/2023  Plan of Care Expiration: 8/28/2022  Visit # / Visits authorized: 1/ 1  FOTO: 1/10      Time In: 1:45  Time Out: 2:30  Total Billable Time: 45 minutes    Precautions: Standard, Standard    Subjective   Date of onset: Chronic  History of current condition - Trice reports: The current episode started more than 1 month ago. The problem occurs constantly. The problem has been waxing and waning. Associated symptoms include myalgias. Pertinent negatives include no numbness, urinary symptoms, vomiting or weakness. The symptoms are aggravated by twisting (immobility and sitting). She has tried NSAIDs and heat (Epson salt baths) for the symptoms. The treatment provided mild relief.        Medical History:   Past Medical History:   Diagnosis Date    Allergy     seasonal    Cervical rib     Vitamin D deficiency        Surgical History:   Flora Edmond  has a past surgical history that includes [place] tooth extraction (2007).    Medications:   Flora has a current medication list which includes the following prescription(s): atovaquone-proguanil, azelastine, cholecalciferol (vitamin d3), diclofenac, loratadine, misoprostol, tivicay, tizanidine, truvada, and valacyclovir, and the following Facility-Administered Medications: levonorgestrel.    Allergies:   Review of patient's allergies indicates:  No Known Allergies     Imaging: None    Prior Therapy: No  Social History:  lives with an adult   Occupation: Manager  Prior Level of Function:  INDP  Current Level of Function: INDP    Pain:   Current 2/10, worst 4/10, best 2/10   Location: right back   Description: Aching, Dull and Throbbing  Aggravating Factors: Standing  Easing Factors: rest    Pts goals: To relieve my back pain    Objective     Range of Motion/Strength:     Thoracolumbar AROM Pain/Dysfunction with Movement   Flexion WNL    Extension WNL    Right side bending WNL    Left side bending WNL    Right rotation WNL    Left rotation WNL        L/E MMT Right Left Pain/Dysfunction with Movement   Hip Flexion 4/5 4/5    Hip Extension 4/5 4/5    Hip Abduction 4/5 4/5    Hip Adduction 4/5 4/5    Hip IR 4/5 4/5    Hip ER 4/5 4/5    Knee Flexion 4/5 4/5    Knee Extension 4/5 4/5    Ankle DF 4/5 4/5    Ankle PF 4/5 4/5    Ankle Inversion 4/5 4/5    Ankle Eversion 4/5 4/5    Big Toe Extension 4/5 4/5        Gait Analysis:Without AD       CMS Impairment/Limitation/Restriction for FOTO  Survey    Therapist reviewed FOTO scores for Flora Edmond on 6/28/2022.   FOTO documents entered into EPIC - see Media section.    Limitation Score: **%  Category: Mobility           TREATMENT   Treatment Time In: 2:15  Treatment Time Out: 2:30  Total Treatment time separate from Evaluation: 15 minutes    Trice received the following manual therapy techniques: Soft tissue Mobilization and FDN were applied to the lumbar spine for 15 minutes, including:  FDN and STM to L Lumbar Spine for 15 mins. Pt gave consent prior to treatment and had no complaints of pain with manual therapy.     Home Exercises Provided and Patient Education Provided     Education provided:   - HEP    Written Home Exercises Provided: Patient instructed to cont prior HEP.  Exercises were reviewed and Trice was able to demonstrate them prior to the end of the session.  Trice demonstrated good  understanding of the education provided.     See EMR under Patient Instructions for exercises provided 6/28/2022.      Assessment   Flora is a 36 y.o.  female referred to outpatient Physical Therapy with a medical diagnosis of Acute bilateral low back pain without sciatica [M54.50]. Pt presents with decreased LE strength, decreased functional mobility, and increased pain. Pt was educated on compliance with HEP and role of PT. Pt tolerated manual therapy session well and had no complaints of pain with manual therapy.    Pt prognosis is Excellent.   Pt will benefit from skilled outpatient Physical Therapy to address the deficits stated above and in the chart below, provide pt/family education, and to maximize pt's level of independence.     Plan of care discussed with patient: Yes  Pt's spiritual, cultural and educational needs considered and patient is agreeable to the plan of care and goals as stated below:     Anticipated Barriers for therapy: None    Medical Necessity is demonstrated by the following  History  Co-morbidities and personal factors that may impact the plan of care Co-morbidities:   See Medical Hx    Personal Factors:   no deficits     low   Examination  Body Structures and Functions, activity limitations and participation restrictions that may impact the plan of care Body Regions:   back  lower extremities    Body Systems:    gross symmetry  ROM  strength  balance  gait  transfers    Participation Restrictions:   None    Activity limitations:   Learning and applying knowledge  no deficits    General Tasks and Commands  no deficits    Communication  no deficits    Mobility  walking    Self care  no deficits    Domestic Life  no deficits    Interactions/Relationships  no deficits    Life Areas  no deficits    Community and Social Life  community life  recreation and leisure         moderate   Clinical Presentation stable and uncomplicated low   Decision Making/ Complexity Score: low       Goals:  Short Term Goals (4 Weeks):  1. Pt will be compliant with HEP to supplement PT in decreasing pain with functional mobility.  2. Pt will improve impaired LE  MMTs by 1/2 score to improve strength for functional tasks.  Long Term Goals (8 Weeks):   1. Pt will improve FOTO score to </= 45% limited to decrease perceived limitation with maintaining/changing body position.   2. Pt will perform pallof press with good control to demonstrate improved core strength.  3. Pt will improve impaired LE MMTs by 1 score to improve strength for functional tasks.  4. Pt will report no pain during lumbar ROM to promote functional mobility.     Plan   Plan of care Certification: 6/28/2022 to 8/28/2022.    Outpatient Physical Therapy 2 times weekly for 10 weeks to include the following interventions: Aquatic Therapy, Cervical/Lumbar Traction, Gait Training, Manual Therapy, Moist Heat/ Ice, Neuromuscular Re-ed, Patient Education, Self Care, Therapeutic Activities, Therapeutic Exercise and Ultrasound, ASTYM, Kinesiotaping PRN, Functional Dry Needling    Jun Bonner, PT, DPT

## 2022-07-14 ENCOUNTER — OFFICE VISIT (OUTPATIENT)
Dept: DERMATOLOGY | Facility: CLINIC | Age: 37
End: 2022-07-14
Payer: COMMERCIAL

## 2022-07-14 DIAGNOSIS — D22.30 MELANOCYTIC NEVI OF FACE: ICD-10-CM

## 2022-07-14 DIAGNOSIS — D48.5 NEOPLASM OF UNCERTAIN BEHAVIOR OF SKIN: Primary | ICD-10-CM

## 2022-07-14 DIAGNOSIS — D22.60 MULTIPLE BENIGN MELANOCYTIC NEVI OF UPPER EXTREMITY, LOWER EXTREMITY, AND TRUNK: ICD-10-CM

## 2022-07-14 DIAGNOSIS — D22.5 MULTIPLE BENIGN MELANOCYTIC NEVI OF UPPER EXTREMITY, LOWER EXTREMITY, AND TRUNK: ICD-10-CM

## 2022-07-14 DIAGNOSIS — Z12.83 SCREENING EXAM FOR SKIN CANCER: ICD-10-CM

## 2022-07-14 DIAGNOSIS — D22.70 MULTIPLE BENIGN MELANOCYTIC NEVI OF UPPER EXTREMITY, LOWER EXTREMITY, AND TRUNK: ICD-10-CM

## 2022-07-14 PROCEDURE — 88305 TISSUE EXAM BY PATHOLOGIST: ICD-10-PCS | Mod: 26,,, | Performed by: PATHOLOGY

## 2022-07-14 PROCEDURE — 88305 TISSUE EXAM BY PATHOLOGIST: CPT | Performed by: PATHOLOGY

## 2022-07-14 PROCEDURE — 1159F PR MEDICATION LIST DOCUMENTED IN MEDICAL RECORD: ICD-10-PCS | Mod: CPTII,S$GLB,, | Performed by: DERMATOLOGY

## 2022-07-14 PROCEDURE — 11102 PR TANGENTIAL BIOPSY, SKIN, SINGLE LESION: ICD-10-PCS | Mod: S$GLB,,, | Performed by: DERMATOLOGY

## 2022-07-14 PROCEDURE — 1160F RVW MEDS BY RX/DR IN RCRD: CPT | Mod: CPTII,S$GLB,, | Performed by: DERMATOLOGY

## 2022-07-14 PROCEDURE — 1159F MED LIST DOCD IN RCRD: CPT | Mod: CPTII,S$GLB,, | Performed by: DERMATOLOGY

## 2022-07-14 PROCEDURE — 11102 TANGNTL BX SKIN SINGLE LES: CPT | Mod: S$GLB,,, | Performed by: DERMATOLOGY

## 2022-07-14 PROCEDURE — 99213 PR OFFICE/OUTPT VISIT, EST, LEVL III, 20-29 MIN: ICD-10-PCS | Mod: 25,S$GLB,, | Performed by: DERMATOLOGY

## 2022-07-14 PROCEDURE — 99213 OFFICE O/P EST LOW 20 MIN: CPT | Mod: 25,S$GLB,, | Performed by: DERMATOLOGY

## 2022-07-14 PROCEDURE — 1160F PR REVIEW ALL MEDS BY PRESCRIBER/CLIN PHARMACIST DOCUMENTED: ICD-10-PCS | Mod: CPTII,S$GLB,, | Performed by: DERMATOLOGY

## 2022-07-14 PROCEDURE — 88305 TISSUE EXAM BY PATHOLOGIST: CPT | Mod: 26,,, | Performed by: PATHOLOGY

## 2022-07-14 NOTE — PATIENT INSTRUCTIONS
Biopsy Wound Care Instructions    Leave the bandage on for 24 hours without getting it wet.   Clean the area once a day with a gentle soap and water, then pat dry and apply Vaseline and a bandaid.  The site should be kept moist with Vaseline at all times to improve healing. Reapply a thick coating as needed. Do not let the site air out or form a scab, as this will delay healing and worsen scarring.  If any bleeding or oozing occurs once you return home, apply firm pressure to the area for 30 minutes straight without peeking. If bleeding continues, call the office immediately.  Please message us via [facility], call us at [phone], or return to the office at any sign of increasing redness, swelling, tenderness, pain, heat, yellow drainage/discharge, or continued bleeding.      Receiving Your Pathology Results    Your pathology results will be released to you on [facility] at the same time that Dr. Robledo receives them.   Dr. Robledo will then message you with her interpretation of the results and/or with the plan going forward.  If you do not use [facility] or if your pathology results require more of an explanation, you will receive your results via a phone call.  If 2 weeks go by and you have not received your results, please message us via [facility] or call us at [phone] to inform us.

## 2022-07-14 NOTE — PROGRESS NOTES
"  Patient Information  Name: Flora Edmond  : 1985  MRN: [id number]     Referring Physician:  No ref. provider found   Primary Care Physician:  Mery Schaeffer MD   Date of Visit: 2022      Subjective:     History of Present lllness:    Flora Edmond is a 36 y.o. female who presents with a chief complaint of moles, lesion, and spot.    Patient is here today for a "mole" check.     Pt has a history of moderate sun exposure in the past.   Pt recalls several blistering sunburns in the past: yes  Pt has history of tanning bed use: yes  Pt has had moles removed in the past: yes-benign  Pt has personal history of skin cancer: no  Pt has family history of melanoma in first degree relatives: yes, mother    Today, patient complains of lesion(s):  Location: back  Duration: new within the past year  Symptoms: more raised recently; No itching, bleeding, pain, or rapid growth  Relieving factors/Previous treatments: none    Spot  Location: right hip  Duration: 5 years  Signs/Symptoms: different from other spots  Relieving factors/Prior treatments: none    Clinical documentation obtained by nursing staff reviewed.    Review of Systems   Skin: Positive for daily sunscreen use, activity-related sunscreen use and wears hat. Negative for itching and rash.   Hematologic/Lymphatic: Does not bruise/bleed easily.       Objective:   Physical Exam   Constitutional: She appears well-developed and well-nourished. No distress.   Neurological: She is alert and oriented to person, place, and time. She is not disoriented.   Psychiatric: She has a normal mood and affect.   Skin:   Areas Examined (abnormalities noted in diagram):   Scalp / Hair Palpated and Inspected  Head / Face Inspection Performed  Neck Inspection Performed  Chest / Axilla Inspection Performed  Abdomen Inspection Performed  Genitals / Buttocks / Groin Inspection Performed  Back Inspection Performed  RUE Inspected  LUE Inspection Performed  RLE Inspected  LLE " Inspection Performed  Nails and Digits Inspection Performed                 Diagram Legend     Erythematous scaling macule/papule c/w actinic keratosis       Vascular papule c/w angioma      Pigmented verrucoid papule/plaque c/w seborrheic keratosis      Yellow umbilicated papule c/w sebaceous hyperplasia      Irregularly shaped tan macule c/w lentigo     1-2 mm smooth white papules consistent with Milia      Movable subcutaneous cyst with punctum c/w epidermal inclusion cyst      Subcutaneous movable cyst c/w pilar cyst      Firm pink to brown papule c/w dermatofibroma      Pedunculated fleshy papule(s) c/w skin tag(s)      Evenly pigmented macule c/w junctional nevus     Mildly variegated pigmented, slightly irregular-bordered macule c/w mildly atypical nevus      Flesh colored to evenly pigmented papule c/w intradermal nevus       Pink pearly papule/plaque c/w basal cell carcinoma      Erythematous hyperkeratotic cursted plaque c/w SCC      Surgical scar with no sign of skin cancer recurrence      Open and closed comedones      Inflammatory papules and pustules      Verrucoid papule consistent consistent with wart     Erythematous eczematous patches and plaques     Dystrophic onycholytic nail with subungual debris c/w onychomycosis     Umbilicated papule    Erythematous-base heme-crusted tan verrucoid plaque consistent with inflamed seborrheic keratosis     Erythematous Silvery Scaling Plaque c/w Psoriasis     See annotation          [] Data reviewed  [] Prior external notes reviewed  [] Independent review of test  [] Management discussed with another provider  [] Independent historian    Assessment / Plan:      Pathology Orders:     Normal Orders This Visit    Specimen to Pathology, Dermatology     Questions:    Procedure Type: Dermatology and skin neoplasms    Number of Specimens: 1    ------------------------: -------------------------    Spec 1 Procedure: Biopsy    Spec 1 Clinical Impression: r/o dysplastic  nevus    Spec 1 Source: right anterior hip    Release to patient:         Neoplasm of uncertain behavior of skin  -     Specimen to Pathology, Dermatology    Shave biopsy procedure note:  Risk, benefits, and alternatives of biopsy are discussed with the patient, including risk of infection, scar, recurrence, and need for additional treatment of site. The patient agrees to the procedure by verbal consent. The area is marked and prepped with alcohol.  Approximately 1 mL of lidocaine 1% with epinephrine is used for local anesthesia. A sharp blade is used to remove the lesion. The specimen is sent for pathology. Hemostasis is obtained with aluminum chloride and/or monopolar hyfrecation if needed. The area is then dressed and bandaged. The patient tolerated the procedure well without adverse event. Written instructions on wound care were given and were reviewed with the patient, who is to call for any signs of bleeding or infection. The patient will be notified of the pathology results.    Melanocytic nevi of face  Multiple benign melanocytic nevi of upper extremity, lower extremity, and trunk  Multiple benign-appearing nevi present on exam today. Reassurance provided. Counseled patient to periodically examine moles and return to clinic if any changes in size, shape, or color are noted or if it becomes symptomatic (bleeding, itching, pain, etc).  Recommend using a broad-spectrum, water-resistant sunscreen with SPF of 30 or higher--reapply every 2 hours. Seek shade, wear sun-protective clothing, and perform regular skin self-exams.    Screening exam for skin cancer  Total body skin examination performed today as noted in physical exam. Suspicious lesion(s) were noted and/or biopsied as above.  Recommend using a broad-spectrum, water-resistant sunscreen with SPF of 30 or higher--reapply every 2 hours. Seek shade, wear sun-protective clothing, and perform regular skin self-exams.    Follow up dependent on pathology  results.      Mesha Robledo MD, FAAD  [facility]

## 2022-07-25 LAB
FINAL PATHOLOGIC DIAGNOSIS: NORMAL
GROSS: NORMAL
Lab: NORMAL
MICROSCOPIC EXAM: NORMAL

## 2022-07-25 NOTE — PROGRESS NOTES
Final Pathologic Diagnosis Skin, right anterior hip, shave biopsy:   -MELANOCYTIC NEVUS, INTRADERMAL TYPE

## 2022-08-12 ENCOUNTER — CLINICAL SUPPORT (OUTPATIENT)
Dept: OTHER | Facility: CLINIC | Age: 37
End: 2022-08-12
Payer: COMMERCIAL

## 2022-08-12 DIAGNOSIS — Z00.8 ENCOUNTER FOR OTHER GENERAL EXAMINATION: ICD-10-CM

## 2022-08-16 VITALS
SYSTOLIC BLOOD PRESSURE: 112 MMHG | WEIGHT: 136.63 LBS | BODY MASS INDEX: 22.76 KG/M2 | DIASTOLIC BLOOD PRESSURE: 72 MMHG | HEIGHT: 65 IN

## 2022-08-16 LAB
HDLC SERPL-MCNC: 62 MG/DL
POC CHOLESTEROL, LDL (DOCK): 105.42 MG/DL
POC CHOLESTEROL, TOTAL: 182 MG/DL
POC GLUCOSE, FASTING: 82 MG/DL (ref 60–110)
TRIGL SERPL-MCNC: 79 MG/DL

## 2022-12-01 ENCOUNTER — PATIENT MESSAGE (OUTPATIENT)
Dept: ADMINISTRATIVE | Facility: OTHER | Age: 37
End: 2022-12-01
Payer: COMMERCIAL

## 2023-01-06 ENCOUNTER — LAB VISIT (OUTPATIENT)
Dept: LAB | Facility: OTHER | Age: 38
End: 2023-01-06
Attending: STUDENT IN AN ORGANIZED HEALTH CARE EDUCATION/TRAINING PROGRAM
Payer: COMMERCIAL

## 2023-01-06 ENCOUNTER — OFFICE VISIT (OUTPATIENT)
Dept: OBSTETRICS AND GYNECOLOGY | Facility: CLINIC | Age: 38
End: 2023-01-06
Payer: COMMERCIAL

## 2023-01-06 VITALS
SYSTOLIC BLOOD PRESSURE: 116 MMHG | HEIGHT: 65 IN | WEIGHT: 142.63 LBS | BODY MASS INDEX: 23.76 KG/M2 | DIASTOLIC BLOOD PRESSURE: 68 MMHG

## 2023-01-06 DIAGNOSIS — R35.0 URINARY FREQUENCY: ICD-10-CM

## 2023-01-06 DIAGNOSIS — Z11.3 SCREEN FOR SEXUALLY TRANSMITTED DISEASES: ICD-10-CM

## 2023-01-06 DIAGNOSIS — Z01.419 ENCOUNTER FOR WELL WOMAN EXAM: Primary | ICD-10-CM

## 2023-01-06 DIAGNOSIS — Z01.419 ENCOUNTER FOR WELL WOMAN EXAM: ICD-10-CM

## 2023-01-06 LAB
BASOPHILS # BLD AUTO: 0.06 K/UL (ref 0–0.2)
BASOPHILS NFR BLD: 1.1 % (ref 0–1.9)
DIFFERENTIAL METHOD: ABNORMAL
EOSINOPHIL # BLD AUTO: 0.6 K/UL (ref 0–0.5)
EOSINOPHIL NFR BLD: 10.2 % (ref 0–8)
ERYTHROCYTE [DISTWIDTH] IN BLOOD BY AUTOMATED COUNT: 13.2 % (ref 11.5–14.5)
HAV IGM SERPL QL IA: NORMAL
HBV CORE IGM SERPL QL IA: NORMAL
HBV SURFACE AG SERPL QL IA: NORMAL
HCG INTACT+B SERPL-ACNC: <1.2 MIU/ML
HCT VFR BLD AUTO: 36.5 % (ref 37–48.5)
HCV AB SERPL QL IA: NORMAL
HGB BLD-MCNC: 11.9 G/DL (ref 12–16)
HIV 1+2 AB+HIV1 P24 AG SERPL QL IA: NORMAL
IMM GRANULOCYTES # BLD AUTO: 0.01 K/UL (ref 0–0.04)
IMM GRANULOCYTES NFR BLD AUTO: 0.2 % (ref 0–0.5)
LYMPHOCYTES # BLD AUTO: 1.8 K/UL (ref 1–4.8)
LYMPHOCYTES NFR BLD: 32.1 % (ref 18–48)
MCH RBC QN AUTO: 28 PG (ref 27–31)
MCHC RBC AUTO-ENTMCNC: 32.6 G/DL (ref 32–36)
MCV RBC AUTO: 86 FL (ref 82–98)
MONOCYTES # BLD AUTO: 0.4 K/UL (ref 0.3–1)
MONOCYTES NFR BLD: 7.5 % (ref 4–15)
NEUTROPHILS # BLD AUTO: 2.7 K/UL (ref 1.8–7.7)
NEUTROPHILS NFR BLD: 48.9 % (ref 38–73)
NRBC BLD-RTO: 0 /100 WBC
PLATELET # BLD AUTO: 389 K/UL (ref 150–450)
PMV BLD AUTO: 9 FL (ref 9.2–12.9)
RBC # BLD AUTO: 4.25 M/UL (ref 4–5.4)
WBC # BLD AUTO: 5.49 K/UL (ref 3.9–12.7)

## 2023-01-06 PROCEDURE — 86592 SYPHILIS TEST NON-TREP QUAL: CPT | Performed by: STUDENT IN AN ORGANIZED HEALTH CARE EDUCATION/TRAINING PROGRAM

## 2023-01-06 PROCEDURE — 99385 PREV VISIT NEW AGE 18-39: CPT | Mod: S$GLB,,, | Performed by: STUDENT IN AN ORGANIZED HEALTH CARE EDUCATION/TRAINING PROGRAM

## 2023-01-06 PROCEDURE — 99385 PR PREVENTIVE VISIT,NEW,18-39: ICD-10-PCS | Mod: S$GLB,,, | Performed by: STUDENT IN AN ORGANIZED HEALTH CARE EDUCATION/TRAINING PROGRAM

## 2023-01-06 PROCEDURE — 1160F PR REVIEW ALL MEDS BY PRESCRIBER/CLIN PHARMACIST DOCUMENTED: ICD-10-PCS | Mod: CPTII,S$GLB,, | Performed by: STUDENT IN AN ORGANIZED HEALTH CARE EDUCATION/TRAINING PROGRAM

## 2023-01-06 PROCEDURE — 85025 COMPLETE CBC W/AUTO DIFF WBC: CPT | Performed by: STUDENT IN AN ORGANIZED HEALTH CARE EDUCATION/TRAINING PROGRAM

## 2023-01-06 PROCEDURE — 1159F PR MEDICATION LIST DOCUMENTED IN MEDICAL RECORD: ICD-10-PCS | Mod: CPTII,S$GLB,, | Performed by: STUDENT IN AN ORGANIZED HEALTH CARE EDUCATION/TRAINING PROGRAM

## 2023-01-06 PROCEDURE — 87591 N.GONORRHOEAE DNA AMP PROB: CPT | Performed by: STUDENT IN AN ORGANIZED HEALTH CARE EDUCATION/TRAINING PROGRAM

## 2023-01-06 PROCEDURE — 99999 PR PBB SHADOW E&M-EST. PATIENT-LVL III: ICD-10-PCS | Mod: PBBFAC,,, | Performed by: STUDENT IN AN ORGANIZED HEALTH CARE EDUCATION/TRAINING PROGRAM

## 2023-01-06 PROCEDURE — 1159F MED LIST DOCD IN RCRD: CPT | Mod: CPTII,S$GLB,, | Performed by: STUDENT IN AN ORGANIZED HEALTH CARE EDUCATION/TRAINING PROGRAM

## 2023-01-06 PROCEDURE — 87624 HPV HI-RISK TYP POOLED RSLT: CPT | Performed by: STUDENT IN AN ORGANIZED HEALTH CARE EDUCATION/TRAINING PROGRAM

## 2023-01-06 PROCEDURE — 99999 PR PBB SHADOW E&M-EST. PATIENT-LVL III: CPT | Mod: PBBFAC,,, | Performed by: STUDENT IN AN ORGANIZED HEALTH CARE EDUCATION/TRAINING PROGRAM

## 2023-01-06 PROCEDURE — 3008F PR BODY MASS INDEX (BMI) DOCUMENTED: ICD-10-PCS | Mod: CPTII,S$GLB,, | Performed by: STUDENT IN AN ORGANIZED HEALTH CARE EDUCATION/TRAINING PROGRAM

## 2023-01-06 PROCEDURE — 3078F PR MOST RECENT DIASTOLIC BLOOD PRESSURE < 80 MM HG: ICD-10-PCS | Mod: CPTII,S$GLB,, | Performed by: STUDENT IN AN ORGANIZED HEALTH CARE EDUCATION/TRAINING PROGRAM

## 2023-01-06 PROCEDURE — 81514 NFCT DS BV&VAGINITIS DNA ALG: CPT | Performed by: STUDENT IN AN ORGANIZED HEALTH CARE EDUCATION/TRAINING PROGRAM

## 2023-01-06 PROCEDURE — 3008F BODY MASS INDEX DOCD: CPT | Mod: CPTII,S$GLB,, | Performed by: STUDENT IN AN ORGANIZED HEALTH CARE EDUCATION/TRAINING PROGRAM

## 2023-01-06 PROCEDURE — 88175 CYTOPATH C/V AUTO FLUID REDO: CPT | Performed by: STUDENT IN AN ORGANIZED HEALTH CARE EDUCATION/TRAINING PROGRAM

## 2023-01-06 PROCEDURE — 3074F SYST BP LT 130 MM HG: CPT | Mod: CPTII,S$GLB,, | Performed by: STUDENT IN AN ORGANIZED HEALTH CARE EDUCATION/TRAINING PROGRAM

## 2023-01-06 PROCEDURE — 3074F PR MOST RECENT SYSTOLIC BLOOD PRESSURE < 130 MM HG: ICD-10-PCS | Mod: CPTII,S$GLB,, | Performed by: STUDENT IN AN ORGANIZED HEALTH CARE EDUCATION/TRAINING PROGRAM

## 2023-01-06 PROCEDURE — 87389 HIV-1 AG W/HIV-1&-2 AB AG IA: CPT | Performed by: STUDENT IN AN ORGANIZED HEALTH CARE EDUCATION/TRAINING PROGRAM

## 2023-01-06 PROCEDURE — 84702 CHORIONIC GONADOTROPIN TEST: CPT | Performed by: STUDENT IN AN ORGANIZED HEALTH CARE EDUCATION/TRAINING PROGRAM

## 2023-01-06 PROCEDURE — 3078F DIAST BP <80 MM HG: CPT | Mod: CPTII,S$GLB,, | Performed by: STUDENT IN AN ORGANIZED HEALTH CARE EDUCATION/TRAINING PROGRAM

## 2023-01-06 PROCEDURE — 1160F RVW MEDS BY RX/DR IN RCRD: CPT | Mod: CPTII,S$GLB,, | Performed by: STUDENT IN AN ORGANIZED HEALTH CARE EDUCATION/TRAINING PROGRAM

## 2023-01-06 PROCEDURE — 80074 ACUTE HEPATITIS PANEL: CPT | Performed by: STUDENT IN AN ORGANIZED HEALTH CARE EDUCATION/TRAINING PROGRAM

## 2023-01-06 NOTE — PROGRESS NOTES
History & Physical  Gynecology      SUBJECTIVE:     Chief Complaint: Well Woman       History of Present Illness:  Flora Edmond is a 37 y.o.  who presents for annual physical exam.   She has no complaints.    She is amenorrheic due to mirena IUD placed in 2019. She is very happy with IUD, would like to continue with it.  She is sexually active, would like STI screening today.  No vaginal discharge, odor, or itching. She denies dyspareunia and post-coital bleeding.   She denies urinary incontinence, dysuria. Does have frequency but she has been evaluated for this several times with no organic cause found. She associates it with anxiety.    She has no history of abnormal pap smear.   Last pap was 2019 and was NILM, HPV-.     She has a family history of breast cancer in her mom and maternal GM. No family history of colon or ovarian cancer.     She used to smoke cigarettes, quit in 2019. Now vapes with nicotine, has considered trying to wean nicotine but not ready to quit at this time.  She drinks alcohol socially.   She does not use recreational or other drugs.   She lives alone and reports she feels safe in her home.     Review of patient's allergies indicates:  No Known Allergies    Past Medical History:   Diagnosis Date    Allergy     seasonal    Cervical rib     Vitamin D deficiency      Past Surgical History:   Procedure Laterality Date    WISDOM TOOTH EXTRACTION       OB History          0    Para   0    Term   0       0    AB   0    Living   0         SAB   0    IAB   0    Ectopic   0    Multiple   0    Live Births                   Family History   Problem Relation Age of Onset    Breast cancer Maternal Grandmother     Cancer Father     Hyperlipidemia Father     Coronary artery disease Father     Thyroid disease Mother     Breast cancer Mother     No Known Problems Sister     Cancer Paternal Uncle     Prostate cancer Paternal Uncle     Colon cancer Neg Hx     Ovarian cancer Neg Hx       Social History     Tobacco Use    Smoking status: Every Day     Packs/day: 0.50     Years: 15.00     Pack years: 7.50     Types: Vaping with nicotine, Cigarettes    Smokeless tobacco: Never    Tobacco comments:     Stop smoking 2019   Substance Use Topics    Alcohol use: Yes     Comment: social    Drug use: No       Current Outpatient Medications   Medication Sig    valACYclovir (VALTREX) 1000 MG tablet Take 1 tablet (1,000 mg total) by mouth once daily.    diclofenac (VOLTAREN) 75 MG EC tablet Take 1 tablet (75 mg total) by mouth 2 (two) times daily as needed (pain). Take with meal (Patient not taking: Reported on 1/6/2023)     No current facility-administered medications for this visit.         Review of Systems:  Review of Systems   Constitutional:  Negative for chills and fever.   Respiratory:  Negative for shortness of breath.    Cardiovascular:  Negative for chest pain.   Gastrointestinal:  Negative for abdominal pain, constipation, diarrhea, nausea and vomiting.   Genitourinary:  Positive for frequency. Negative for dysuria, menstrual problem, pelvic pain, vaginal bleeding, vaginal discharge and vaginal odor.   Integumentary:  Negative for breast mass, nipple discharge and breast skin changes.   Neurological:  Negative for headaches.   Breast: Negative for mass, nipple discharge and skin changes     OBJECTIVE:     Physical Exam:  Physical Exam  Vitals reviewed. Exam conducted with a chaperone present.   Constitutional:       General: She is not in acute distress.     Appearance: She is well-developed. She is not ill-appearing, toxic-appearing or diaphoretic.   HENT:      Head: Normocephalic and atraumatic.   Eyes:      Conjunctiva/sclera: Conjunctivae normal.   Cardiovascular:      Rate and Rhythm: Normal rate.   Pulmonary:      Effort: Pulmonary effort is normal. No respiratory distress.   Chest:   Breasts:     Right: Normal. No swelling, bleeding, inverted nipple, mass, nipple discharge, skin change or  tenderness.      Left: Normal. No swelling, bleeding, inverted nipple, mass, nipple discharge, skin change or tenderness.   Abdominal:      Palpations: Abdomen is soft. There is no mass.      Tenderness: There is no abdominal tenderness. There is no guarding or rebound.   Genitourinary:     General: Normal vulva.      Vagina: Normal. No vaginal discharge or bleeding.      Cervix: No cervical motion tenderness.      Uterus: Not enlarged and not tender.       Adnexa:         Right: No mass, tenderness or fullness.          Left: No mass, tenderness or fullness.        Comments: IUD strings unable to be visualized.  Musculoskeletal:         General: Normal range of motion.      Cervical back: Normal range of motion.   Skin:     General: Skin is warm and dry.   Neurological:      Mental Status: She is alert.   Psychiatric:         Mood and Affect: Mood normal.         Behavior: Behavior normal.         ASSESSMENT:       ICD-10-CM ICD-9-CM    1. Encounter for well woman exam  Z01.419 V72.31 Liquid-Based Pap Smear, Screening      HIV 1/2 Ag/Ab (4th Gen)      Hepatitis Panel, Acute      CBC Auto Differential      RPR      HPV High Risk Genotypes, PCR      Vaginosis Screen by DNA Probe      C. trachomatis/N. gonorrhoeae by AMP DNA [facility]; Cervicovaginal             Plan:      -- Pap and HPV today.  -- Full STI panel.  -- Continue mirena for contraception. hcg added to labs as strings were not visualized on exam today. She states she asked for the strings to be cut short so she is not surprised that they are not visible. Discussed ultrasound for confirmation that IUD is in place, she would like to hold off. Will let us know if she has any change in bleeding pattern, pain, etc.  -- She has started the HPV vaccine series but has not completed it yet. Would like to get second dose today. Order placed.  -- RTC in 1 year for annual or sooner PRN.      Mindy Portillo MD

## 2023-01-07 LAB
BACTERIAL VAGINOSIS DNA: POSITIVE
C TRACH DNA SPEC QL NAA+PROBE: NOT DETECTED
CANDIDA GLABRATA DNA: NEGATIVE
CANDIDA KRUSEI DNA: NEGATIVE
CANDIDA RRNA VAG QL PROBE: NEGATIVE
N GONORRHOEA DNA SPEC QL NAA+PROBE: NOT DETECTED
RPR SER QL: NORMAL
T VAGINALIS RRNA GENITAL QL PROBE: NEGATIVE

## 2023-01-07 RX ORDER — METRONIDAZOLE 500 MG/1
500 TABLET ORAL EVERY 12 HOURS
Qty: 14 TABLET | Refills: 0 | Status: SHIPPED | OUTPATIENT
Start: 2023-01-07 | End: 2023-01-14

## 2023-01-09 ENCOUNTER — IMMUNIZATION (OUTPATIENT)
Dept: PHARMACY | Facility: CLINIC | Age: 38
End: 2023-01-09
Payer: COMMERCIAL

## 2023-02-02 ENCOUNTER — PATIENT MESSAGE (OUTPATIENT)
Dept: OBSTETRICS AND GYNECOLOGY | Facility: CLINIC | Age: 38
End: 2023-02-02
Payer: COMMERCIAL

## 2023-02-13 ENCOUNTER — PATIENT MESSAGE (OUTPATIENT)
Dept: OBSTETRICS AND GYNECOLOGY | Facility: CLINIC | Age: 38
End: 2023-02-13
Payer: COMMERCIAL

## 2023-03-03 ENCOUNTER — TELEPHONE (OUTPATIENT)
Dept: OBSTETRICS AND GYNECOLOGY | Facility: CLINIC | Age: 38
End: 2023-03-03
Payer: COMMERCIAL

## 2023-03-03 NOTE — TELEPHONE ENCOUNTER
----- Message from Jesús Hubbard sent at 3/3/2023  2:30 PM CST -----      Name of Who is Calling: J CARLOS STOREY [[id number]]      What is the request in detail: [facility] called regarding a test that was ordered Jan 6th for the pt.Please contact to further discuss and advise.          Can the clinic reply by [facility]: N      What Number to Call Back [facility]: [facility] [phone]

## 2023-04-12 ENCOUNTER — PATIENT MESSAGE (OUTPATIENT)
Dept: OBSTETRICS AND GYNECOLOGY | Facility: CLINIC | Age: 38
End: 2023-04-12
Payer: COMMERCIAL

## 2023-04-28 ENCOUNTER — PATIENT MESSAGE (OUTPATIENT)
Dept: OBSTETRICS AND GYNECOLOGY | Facility: CLINIC | Age: 38
End: 2023-04-28
Payer: COMMERCIAL

## 2023-06-23 ENCOUNTER — PATIENT MESSAGE (OUTPATIENT)
Dept: PRIMARY CARE CLINIC | Facility: CLINIC | Age: 38
End: 2023-06-23

## 2023-06-23 ENCOUNTER — OFFICE VISIT (OUTPATIENT)
Dept: PRIMARY CARE CLINIC | Facility: CLINIC | Age: 38
End: 2023-06-23
Payer: COMMERCIAL

## 2023-06-23 VITALS
HEART RATE: 86 BPM | TEMPERATURE: 98 F | RESPIRATION RATE: 18 BRPM | SYSTOLIC BLOOD PRESSURE: 110 MMHG | OXYGEN SATURATION: 97 % | BODY MASS INDEX: 22.61 KG/M2 | WEIGHT: 135.69 LBS | DIASTOLIC BLOOD PRESSURE: 70 MMHG | HEIGHT: 65 IN

## 2023-06-23 DIAGNOSIS — R19.7 DIARRHEA, UNSPECIFIED TYPE: ICD-10-CM

## 2023-06-23 DIAGNOSIS — M53.3 CHRONIC RIGHT SI JOINT PAIN: ICD-10-CM

## 2023-06-23 DIAGNOSIS — Z76.89 ENCOUNTER TO ESTABLISH CARE: Primary | ICD-10-CM

## 2023-06-23 DIAGNOSIS — R53.83 FATIGUE, UNSPECIFIED TYPE: ICD-10-CM

## 2023-06-23 DIAGNOSIS — R06.09 DYSPNEA ON EXERTION: ICD-10-CM

## 2023-06-23 DIAGNOSIS — G89.29 CHRONIC RIGHT SI JOINT PAIN: ICD-10-CM

## 2023-06-23 DIAGNOSIS — K92.1 BLOOD IN STOOL: ICD-10-CM

## 2023-06-23 PROCEDURE — 3078F DIAST BP <80 MM HG: CPT | Mod: CPTII,S$GLB,, | Performed by: STUDENT IN AN ORGANIZED HEALTH CARE EDUCATION/TRAINING PROGRAM

## 2023-06-23 PROCEDURE — 1159F MED LIST DOCD IN RCRD: CPT | Mod: CPTII,S$GLB,, | Performed by: STUDENT IN AN ORGANIZED HEALTH CARE EDUCATION/TRAINING PROGRAM

## 2023-06-23 PROCEDURE — 1159F PR MEDICATION LIST DOCUMENTED IN MEDICAL RECORD: ICD-10-PCS | Mod: CPTII,S$GLB,, | Performed by: STUDENT IN AN ORGANIZED HEALTH CARE EDUCATION/TRAINING PROGRAM

## 2023-06-23 PROCEDURE — 93005 ELECTROCARDIOGRAM TRACING: CPT | Mod: S$GLB,,, | Performed by: STUDENT IN AN ORGANIZED HEALTH CARE EDUCATION/TRAINING PROGRAM

## 2023-06-23 PROCEDURE — 3078F PR MOST RECENT DIASTOLIC BLOOD PRESSURE < 80 MM HG: ICD-10-PCS | Mod: CPTII,S$GLB,, | Performed by: STUDENT IN AN ORGANIZED HEALTH CARE EDUCATION/TRAINING PROGRAM

## 2023-06-23 PROCEDURE — 3074F PR MOST RECENT SYSTOLIC BLOOD PRESSURE < 130 MM HG: ICD-10-PCS | Mod: CPTII,S$GLB,, | Performed by: STUDENT IN AN ORGANIZED HEALTH CARE EDUCATION/TRAINING PROGRAM

## 2023-06-23 PROCEDURE — 3008F BODY MASS INDEX DOCD: CPT | Mod: CPTII,S$GLB,, | Performed by: STUDENT IN AN ORGANIZED HEALTH CARE EDUCATION/TRAINING PROGRAM

## 2023-06-23 PROCEDURE — 93005 EKG 12-LEAD: ICD-10-PCS | Mod: S$GLB,,, | Performed by: STUDENT IN AN ORGANIZED HEALTH CARE EDUCATION/TRAINING PROGRAM

## 2023-06-23 PROCEDURE — 3074F SYST BP LT 130 MM HG: CPT | Mod: CPTII,S$GLB,, | Performed by: STUDENT IN AN ORGANIZED HEALTH CARE EDUCATION/TRAINING PROGRAM

## 2023-06-23 PROCEDURE — 1160F RVW MEDS BY RX/DR IN RCRD: CPT | Mod: CPTII,S$GLB,, | Performed by: STUDENT IN AN ORGANIZED HEALTH CARE EDUCATION/TRAINING PROGRAM

## 2023-06-23 PROCEDURE — 99999 PR PBB SHADOW E&M-EST. PATIENT-LVL V: ICD-10-PCS | Mod: PBBFAC,,, | Performed by: STUDENT IN AN ORGANIZED HEALTH CARE EDUCATION/TRAINING PROGRAM

## 2023-06-23 PROCEDURE — 3008F PR BODY MASS INDEX (BMI) DOCUMENTED: ICD-10-PCS | Mod: CPTII,S$GLB,, | Performed by: STUDENT IN AN ORGANIZED HEALTH CARE EDUCATION/TRAINING PROGRAM

## 2023-06-23 PROCEDURE — 93010 ELECTROCARDIOGRAM REPORT: CPT | Mod: S$GLB,,, | Performed by: INTERNAL MEDICINE

## 2023-06-23 PROCEDURE — 93010 EKG 12-LEAD: ICD-10-PCS | Mod: S$GLB,,, | Performed by: INTERNAL MEDICINE

## 2023-06-23 PROCEDURE — 99214 PR OFFICE/OUTPT VISIT, EST, LEVL IV, 30-39 MIN: ICD-10-PCS | Mod: S$GLB,,, | Performed by: STUDENT IN AN ORGANIZED HEALTH CARE EDUCATION/TRAINING PROGRAM

## 2023-06-23 PROCEDURE — 99999 PR PBB SHADOW E&M-EST. PATIENT-LVL V: CPT | Mod: PBBFAC,,, | Performed by: STUDENT IN AN ORGANIZED HEALTH CARE EDUCATION/TRAINING PROGRAM

## 2023-06-23 PROCEDURE — 99214 OFFICE O/P EST MOD 30 MIN: CPT | Mod: S$GLB,,, | Performed by: STUDENT IN AN ORGANIZED HEALTH CARE EDUCATION/TRAINING PROGRAM

## 2023-06-23 PROCEDURE — 1160F PR REVIEW ALL MEDS BY PRESCRIBER/CLIN PHARMACIST DOCUMENTED: ICD-10-PCS | Mod: CPTII,S$GLB,, | Performed by: STUDENT IN AN ORGANIZED HEALTH CARE EDUCATION/TRAINING PROGRAM

## 2023-06-23 RX ORDER — PANTOPRAZOLE SODIUM 40 MG/1
40 TABLET, DELAYED RELEASE ORAL DAILY
Qty: 30 TABLET | Refills: 11 | Status: ON HOLD | OUTPATIENT
Start: 2023-06-23 | End: 2023-06-30 | Stop reason: HOSPADM

## 2023-06-23 NOTE — PROGRESS NOTES
Subjective:       Patient ID: Flora Edmond is a 37 y.o. female.    Chief Complaint: Abdominal Pain, Diarrhea, and Back Pain    HPI:  37 y.o. female presents to [facility] with complaints of abdominal pain and diarrhea    Patient reports symptoms began around March 2023 following COVID infection. Has been having abdominal pains, loose stool, blood in stool, and sensation of needing to defecate frequently. Thought was improving last 2 weeks, but had worst 2 days recently. Last 12 weeks has lost weight. No change in diet and not exercising differently.    Can feel short of breath at times and sister reported some concerns for anemia as culprit.    Patient has IUD and not having heavy cycles. Can be large amount of blood in toilet water. Both mixed in stool and in water.    Seen in past for concern?: No  Progressing?: Initially felt somewhat better, then returned even worse.  Interventions?: Probiotic, pepto bismol pills    Abdominal pain can feel like cramping and gas and sensation of need to defecate. Comes and goes, has increased in frequency. Defecation not helping any longer.    Stools are consistently poorly formed.    Food diary/Elimination diet?: Hasn't tried    No known autoimmune disease in family.    Has back pain to right lower back    Onset?: Has been present for some time  Progression?: Yes, worse intensity  Inciting incident/new physical activity?: Fell at soccer game ~3/2022, did see someone last summer.  Past trauma/surgery?: No  Recurrent?: Yes  Description?: Aching and throbbing  Radiates?: Can feel like legs are heavy at times  Severity?: 7/10 at worst, always somewhat present  Worsening factors?: Certain movements  Interventions?: ibuprofen, cyclobenzaprine, massage, warm bath  Did interventions help?: Transient    Last PCP?: Dr. Nathanael Schaeffer  Allergies: NKDA  Medical History: None  Medications: Probiotic, claratin-D on occasion  Surgical History: wisdom tooth extraction  Family History:  "Mother breast cancer, father prostate cancer, maternal grandmother breast cancer; no known autoimmune disease  Social History: EtOH 3 days per week 2-4 drinks, no illicits    Last tetanus vaccine was 10/3/2021    Review of Systems   Constitutional:  Positive for fatigue and unexpected weight change. Negative for chills, diaphoresis and fever.   Respiratory:  Positive for shortness of breath (With mild exhertion). Negative for chest tightness.    Cardiovascular:  Negative for chest pain and palpitations.   Gastrointestinal:  Positive for abdominal pain, blood in stool and diarrhea. Negative for nausea and vomiting.   Musculoskeletal:  Positive for back pain. Negative for joint swelling.   Skin:  Negative for rash and wound.   Neurological:  Positive for light-headedness (If taking too hot of a shower). Negative for dizziness.     Objective:      Vitals:    06/23/23 1130   BP: 110/70   BP Location: Left arm   Patient Position: Sitting   BP Method: Medium (Manual)   Pulse: 86   Resp: 18   Temp: 98 °F (36.7 °C)   TempSrc: Temporal   SpO2: 97%   Weight: 61.5 kg (135 lb 11.1 oz)   Height: 5' 5" (1.651 m)     Physical Exam  Vitals reviewed.   Constitutional:       General: She is not in acute distress.     Appearance: Normal appearance. She is not ill-appearing.   HENT:      Head: Normocephalic and atraumatic.   Eyes:      General:         Right eye: No discharge.         Left eye: No discharge.      Conjunctiva/sclera: Conjunctivae normal.   Cardiovascular:      Rate and Rhythm: Normal rate and regular rhythm.      Pulses: Normal pulses.      Heart sounds: No murmur heard.  Pulmonary:      Effort: Pulmonary effort is normal.      Breath sounds: Normal breath sounds.   Abdominal:      General: Abdomen is flat. Bowel sounds are normal. There is no distension.      Palpations: Abdomen is soft. There is no mass.      Tenderness: There is abdominal tenderness (Mild, generalized). There is no guarding.   Musculoskeletal:        "  General: No deformity.      Lumbar back: Normal. No swelling, edema, deformity, signs of trauma, lacerations, spasms, tenderness or bony tenderness. Normal range of motion (Tightness with full extension, lateral flexion and lateral rotation). No scoliosis.   Skin:     General: Skin is warm and dry.      Coloration: Skin is pale. Skin is not jaundiced.   Neurological:      General: No focal deficit present.      Mental Status: She is alert and oriented to person, place, and time.   Psychiatric:         Mood and Affect: Mood normal.         Behavior: Behavior normal.           Lab Results   Component Value Date     03/20/2018    K 4.0 03/20/2018     03/20/2018    CO2 22 (L) 03/20/2018    BUN 7 03/20/2018    CREATININE 0.7 03/20/2018    ANIONGAP 10 03/20/2018     No results found for: HGBA1C  No results found for: BNP, BNPTRIAGEBLO    Lab Results   Component Value Date    WBC 5.49 01/06/2023    HGB 11.9 (L) 01/06/2023    HCT 36.5 (L) 01/06/2023     01/06/2023    GRAN 2.7 01/06/2023    GRAN 48.9 01/06/2023     Lab Results   Component Value Date    CHOL 174 08/12/2015    HDL 59 08/12/2015    LDLCALC 89.0 08/12/2015    TRIG 130 08/12/2015          Current Outpatient Medications:     Lactobacillus acidophilus (PROBIOTIC ORAL), Take by mouth., Disp: , Rfl:     pantoprazole (PROTONIX) 40 MG tablet, Take 1 tablet (40 mg total) by mouth once daily., Disp: 30 tablet, Rfl: 11        Assessment:       1. Encounter to establish care    2. Fatigue, unspecified type    3. Chronic right SI joint pain    4. Diarrhea, unspecified type    5. Blood in stool    6. Dyspnea on exertion           Plan:       Encounter to establish care  Fatigue, unspecified type  Dyspnea on exertion  -     X-Ray Chest PA And Lateral; Future; Expected date: 06/23/2023  -     EKG 12-lead  -     CBC auto differential; Future; Expected date: 06/23/2023  -     Lactate dehydrogenase (LDH); Future; Expected date: 06/23/2023  -     Uric acid;  Future; Expected date: 06/23/2023  -     Sedimentation rate; Future; Expected date: 06/23/2023  -     Comprehensive metabolic panel; Future; Expected date: 06/23/2023  -     Cancel: Urinalysis  -     Monospot; Future; Expected date: 06/23/2023  -     TSH; Future; Expected date: 06/23/2023  -     pantoprazole (PROTONIX) 40 MG tablet; Take 1 tablet (40 mg total) by mouth once daily.  Dispense: 30 tablet; Refill: 11  -     Ambulatory referral/consult to Gastroenterology; Future; Expected date: 06/30/2023  -     Cancel: X-Ray Sacroiliac Joints Complete; Future; Expected date: 06/23/2023  -     Ferritin; Future; Expected date: 06/23/2023  -     C-reactive protein; Future; Expected date: 06/23/2023  -     Urinalysis; Future; Expected date: 06/23/2023  - Will assess for reversible cause, concerns for anemia with exam and history    Chronic right SI joint pain  -     Ambulatory referral/consult to Physical/Occupational Therapy; Future; Expected date: 06/30/2023  - If no improvement in pain in coming weeks, consider x-ray lumbar/SI    Diarrhea, unspecified type  Blood in stool  -     H. pylori antigen, stool; Future; Expected date: 06/23/2023  -     Pancreatic elastase, fecal; Future; Expected date: 06/23/2023  -     Fecal fat, qualitative; Future; Expected date: 06/23/2023  -     Occult blood x 1, stool; Future; Expected date: 06/23/2023  -     WBC, Stool; Future; Expected date: 06/23/2023  -     Rotavirus antigen, stool; Future; Expected date: 06/23/2023  -     Adenovirus Molecular Detection, PCR, Non-Blood Stool; Future; Expected date: 06/23/2023  -     Calprotectin, Stool; Future; Expected date: 06/23/2023  -     Giardia / Cryptosporidum, EIA; Future; Expected date: 06/23/2023  -     Stool Exam-Ova,Cysts,Parasites; Future; Expected date: 06/23/2023  -     Clostridium difficile EIA; Future; Expected date: 06/23/2023  -     Stool culture; Future; Expected date: 06/23/2023  -     C-reactive protein; Future; Expected date:  06/23/2023  - Will begin protonix at this time and refer to GI for evaluation  - Recommend elimination diet and diet diary. Also can attempt trial of [facility] anti-inflammatory diet    RTC in 6 weeks

## 2023-06-29 ENCOUNTER — TELEPHONE (OUTPATIENT)
Dept: ENDOSCOPY | Facility: HOSPITAL | Age: 38
End: 2023-06-29
Payer: COMMERCIAL

## 2023-06-29 ENCOUNTER — OFFICE VISIT (OUTPATIENT)
Dept: SURGERY | Facility: CLINIC | Age: 38
End: 2023-06-29
Payer: COMMERCIAL

## 2023-06-29 ENCOUNTER — ANESTHESIA EVENT (OUTPATIENT)
Dept: ENDOSCOPY | Facility: HOSPITAL | Age: 38
End: 2023-06-29
Payer: COMMERCIAL

## 2023-06-29 VITALS
SYSTOLIC BLOOD PRESSURE: 107 MMHG | WEIGHT: 136.25 LBS | BODY MASS INDEX: 22.7 KG/M2 | HEART RATE: 75 BPM | DIASTOLIC BLOOD PRESSURE: 66 MMHG | HEIGHT: 65 IN

## 2023-06-29 VITALS — BODY MASS INDEX: 22.41 KG/M2 | WEIGHT: 134.5 LBS | HEIGHT: 65 IN

## 2023-06-29 DIAGNOSIS — R19.7 DIARRHEA, UNSPECIFIED TYPE: ICD-10-CM

## 2023-06-29 DIAGNOSIS — R53.83 FATIGUE, UNSPECIFIED TYPE: ICD-10-CM

## 2023-06-29 DIAGNOSIS — D50.9 IRON DEFICIENCY ANEMIA, UNSPECIFIED IRON DEFICIENCY ANEMIA TYPE: ICD-10-CM

## 2023-06-29 DIAGNOSIS — R19.7 BLOODY DIARRHEA: ICD-10-CM

## 2023-06-29 DIAGNOSIS — K62.5 RECTAL BLEEDING: Primary | ICD-10-CM

## 2023-06-29 DIAGNOSIS — R10.9 ABDOMINAL PAIN, UNSPECIFIED ABDOMINAL LOCATION: ICD-10-CM

## 2023-06-29 DIAGNOSIS — K64.9 HEMORRHOIDS, UNSPECIFIED HEMORRHOID TYPE: Primary | ICD-10-CM

## 2023-06-29 PROCEDURE — 3008F PR BODY MASS INDEX (BMI) DOCUMENTED: ICD-10-PCS | Mod: CPTII,S$GLB,, | Performed by: NURSE PRACTITIONER

## 2023-06-29 PROCEDURE — 99204 PR OFFICE/OUTPT VISIT, NEW, LEVL IV, 45-59 MIN: ICD-10-PCS | Mod: S$GLB,,, | Performed by: NURSE PRACTITIONER

## 2023-06-29 PROCEDURE — 3074F SYST BP LT 130 MM HG: CPT | Mod: CPTII,S$GLB,, | Performed by: NURSE PRACTITIONER

## 2023-06-29 PROCEDURE — 99204 OFFICE O/P NEW MOD 45 MIN: CPT | Mod: S$GLB,,, | Performed by: NURSE PRACTITIONER

## 2023-06-29 PROCEDURE — 99999 PR PBB SHADOW E&M-EST. PATIENT-LVL III: CPT | Mod: PBBFAC,,, | Performed by: NURSE PRACTITIONER

## 2023-06-29 PROCEDURE — 3008F BODY MASS INDEX DOCD: CPT | Mod: CPTII,S$GLB,, | Performed by: NURSE PRACTITIONER

## 2023-06-29 PROCEDURE — 3078F PR MOST RECENT DIASTOLIC BLOOD PRESSURE < 80 MM HG: ICD-10-PCS | Mod: CPTII,S$GLB,, | Performed by: NURSE PRACTITIONER

## 2023-06-29 PROCEDURE — 99999 PR PBB SHADOW E&M-EST. PATIENT-LVL III: ICD-10-PCS | Mod: PBBFAC,,, | Performed by: NURSE PRACTITIONER

## 2023-06-29 PROCEDURE — 3078F DIAST BP <80 MM HG: CPT | Mod: CPTII,S$GLB,, | Performed by: NURSE PRACTITIONER

## 2023-06-29 PROCEDURE — 1160F RVW MEDS BY RX/DR IN RCRD: CPT | Mod: CPTII,S$GLB,, | Performed by: NURSE PRACTITIONER

## 2023-06-29 PROCEDURE — 3074F PR MOST RECENT SYSTOLIC BLOOD PRESSURE < 130 MM HG: ICD-10-PCS | Mod: CPTII,S$GLB,, | Performed by: NURSE PRACTITIONER

## 2023-06-29 PROCEDURE — 1159F PR MEDICATION LIST DOCUMENTED IN MEDICAL RECORD: ICD-10-PCS | Mod: CPTII,S$GLB,, | Performed by: NURSE PRACTITIONER

## 2023-06-29 PROCEDURE — 1160F PR REVIEW ALL MEDS BY PRESCRIBER/CLIN PHARMACIST DOCUMENTED: ICD-10-PCS | Mod: CPTII,S$GLB,, | Performed by: NURSE PRACTITIONER

## 2023-06-29 PROCEDURE — 1159F MED LIST DOCD IN RCRD: CPT | Mod: CPTII,S$GLB,, | Performed by: NURSE PRACTITIONER

## 2023-06-29 RX ORDER — HYDROCORTISONE 25 MG/G
CREAM TOPICAL 2 TIMES DAILY
Qty: 28 G | Refills: 2 | Status: SHIPPED | OUTPATIENT
Start: 2023-06-29

## 2023-06-29 RX ORDER — SOD SULF/POT CHLORIDE/MAG SULF 1.479 G
12 TABLET ORAL DAILY
Qty: 24 TABLET | Refills: 0 | Status: ON HOLD | OUTPATIENT
Start: 2023-06-29 | End: 2023-06-30 | Stop reason: HOSPADM

## 2023-06-29 NOTE — TELEPHONE ENCOUNTER
"----- Message from Ashley Aguilar NP sent at 2023  8:55 AM CDT -----  Procedure: Colonoscopy    Diagnosis: Rectal bleeding and Diarrhea    Procedure Timin-4 weeks    #If within 4 weeks selected, please lashae as high priority#    #If greater than 12 weeks, please select "5-12 weeks" and delay sending until 2 months prior to requested date#     Provider: Any endoscopist    [facility]    Additional Scheduling Information: No scheduling concerns    Prep Specifications:Standard prep    Have you attached a patient to this message: yes      "

## 2023-06-29 NOTE — H&P (VIEW-ONLY)
CRS Office Visit History and Physical    Referring Md:   Glenn Vega Md  [address] [place] [address]    SUBJECTIVE:     Chief Complaint: loose/bloody stools    History of Present Illness:  The patient is new patient to this practice.   Course is as follows:  Patient is a 37 y.o. female presents with bloody diarrhea since getting Covid in March 2023. Received paxlovid x2.  Having multiple bowel movements per day.   Also reports approx 10 lb weight loss in past 3 months.   No decrease in appetite/intake. Denies joint pain.   +tenesmus, not relieved by bm.     Seen by PCP 3 days ago, noted to have LEROY, started on PO iron. Stools black since iron supplement.   Appropriate stool studies performed. Most still in process. +occult blood, - c.diff.    Reports hemorrhoidal irritation d/t frequent diarrhea.     Last Colonoscopy: none  Family history of colorectal cancer or IBD: none.    Review of patient's allergies indicates:  No Known Allergies    Past Medical History:   Diagnosis Date    Allergy     seasonal    Cervical rib     Vitamin D deficiency      Past Surgical History:   Procedure Laterality Date    WISDOM TOOTH EXTRACTION  2007     Family History   Problem Relation Age of Onset    Breast cancer Maternal Grandmother     Cancer Maternal Grandmother     Cancer Father     Hyperlipidemia Father     Coronary artery disease Father     Heart disease Father     Thyroid disease Mother     Breast cancer Mother     Asthma Mother     Cancer Mother     No Known Problems Sister     Cancer Paternal Uncle     Prostate cancer Paternal Uncle     COPD Paternal Grandfather     Heart disease Paternal Grandfather     Colon cancer Neg Hx     Ovarian cancer Neg Hx      Social History     Tobacco Use    Smoking status: Every Day     Packs/day: 0.50     Years: 15.00     Pack years: 7.50     Types: Vaping with nicotine, Cigarettes    Smokeless tobacco: Never    Tobacco comments:     Stop smoking 2019   Substance Use  "Topics    Alcohol use: Yes     Alcohol/week: 5.0 standard drinks     Types: 5 Glasses of wine per week     Comment: social    Drug use: No        Review of Systems:  Review of Systems   Constitutional:  Positive for malaise/fatigue and weight loss.   Gastrointestinal:  Positive for blood in stool and diarrhea. Negative for constipation, heartburn, melena, nausea and vomiting.        Tenesmus    Musculoskeletal:  Negative for joint pain.   Skin:  Negative for rash.     OBJECTIVE:     Vital Signs (Most Recent)  Blood Pressure 107/66 (BP Location: Left arm, Patient Position: Sitting, BP Method: Small (Automatic))   Pulse 75   Height 5' 5" (1.651 m)   Weight 61.8 kg (136 lb 3.9 oz)   Body Mass Index 22.67 kg/m²     Physical Exam:  General: White female in no distress   Neuro: Alert and oriented to person, place, and time.  Moves all extremities.     HEENT: No icterus.  Trachea midline  Respiratory: Respirations are even and unlabored, no cough or audible wheezing  Skin: Warm dry and intact, No visible rashes, no jaundice    Labs reviewed today:  Lab Results   Component Value Date    WBC 5.31 06/23/2023    HGB 8.1 (L) 06/23/2023    HCT 27.9 (L) 06/23/2023     06/23/2023    CHOL 174 08/12/2015    TRIG 130 08/12/2015    HDL 59 08/12/2015    ALT 11 06/23/2023    AST 16 06/23/2023     06/23/2023    K 3.9 06/23/2023     06/23/2023    CREATININE 0.7 06/23/2023    BUN 12 06/23/2023    CO2 23 06/23/2023    TSH 1.378 06/23/2023       Imaging reviewed today:  none    Endoscopy reviewed today:  none    Anorectal Exam:    Anal Skin: External hemorrhoids    Digital Rectal Exam:  Resting Tone normal  Mass none  Tenderness  absent    Anoscopy:  Verbal consent was obtained.   Clear plastic anoscope inserted.    Hemorrhoids  present  Stigmata of bleeding  present  Stigmata of prolapsed  present  Distal rectal mucosa normal  - no proctitis noted    ASSESSMENT/PLAN:     Diagnoses and all orders for this " visit:    Hemorrhoids, unspecified hemorrhoid type  -     hydrocortisone (ANUSOL-HC) 2.5 % rectal cream; Place rectally 2 (two) times daily.    Fatigue, unspecified type  -     Ambulatory referral/consult to Gastroenterology    Iron deficiency anemia, unspecified iron deficiency anemia type    Abdominal pain, unspecified abdominal location    Bloody diarrhea          The patient was instructed to:  Schedule colonoscopy  Await pending stool studies including calprotectin  Treatment/follow up based on colonoscopy results      STEVE ChandP-MEY  Colon and Rectal Surgery

## 2023-06-29 NOTE — ANESTHESIA PREPROCEDURE EVALUATION
06/29/2023  Flora Edmond is a 37 y.o., female.      Pre-op Assessment    I have reviewed the Patient Summary Reports.    I have reviewed the NPO Status.   I have reviewed the Medications.     Review of Systems  Anesthesia Hx:  No problems with previous Anesthesia    Social:  Smoker    Hematology/Oncology:  Hematology Normal   Oncology Normal     EENT/Dental:EENT/Dental Normal   Cardiovascular:  Cardiovascular Normal     Pulmonary:  Pulmonary Normal    Renal/:  Renal/ Normal     Hepatic/GI:  Hepatic/GI Normal    Musculoskeletal:  Musculoskeletal Normal    Neurological:  Neurology Normal    Endocrine:  Endocrine Normal    Dermatological:  Skin Normal    Psych:  Psychiatric Normal             Patient Active Problem List   Diagnosis    [facility] Jan 2015    HSV-1 (herpes simplex virus 1) infection    Vitamin D deficiency    Family history of hemophilia B    Tobacco abuse       Past Medical History:   Diagnosis Date    Allergy     seasonal    Cervical rib     Vitamin D deficiency      Past Surgical History:   Procedure Laterality Date    WISDOM TOOTH EXTRACTION  2007         Anesthesia Plan  Type of Anesthesia, risks & benefits discussed:    Anesthesia Type: Gen Natural Airway  Intra-op Monitoring Plan: Standard ASA Monitors  Induction:  IV  Informed Consent: Informed consent signed with the Patient and all parties understand the risks and agree with anesthesia plan.  All questions answered.   ASA Score: 2  Day of Surgery Review of History & Physical: H&P Update referred to the surgeon/provider.    Ready For Surgery From Anesthesia Perspective.     .

## 2023-06-29 NOTE — PROGRESS NOTES
CRS Office Visit History and Physical    Referring Md:   Glenn Vega Md  [address] [place] [address]    SUBJECTIVE:     Chief Complaint: loose/bloody stools    History of Present Illness:  The patient is new patient to this practice.   Course is as follows:  Patient is a 37 y.o. female presents with bloody diarrhea since getting Covid in March 2023. Received paxlovid x2.  Having multiple bowel movements per day.   Also reports approx 10 lb weight loss in past 3 months.   No decrease in appetite/intake. Denies joint pain.   +tenesmus, not relieved by bm.     Seen by PCP 3 days ago, noted to have LEROY, started on PO iron. Stools black since iron supplement.   Appropriate stool studies performed. Most still in process. +occult blood, - c.diff.    Reports hemorrhoidal irritation d/t frequent diarrhea.     Last Colonoscopy: none  Family history of colorectal cancer or IBD: none.    Review of patient's allergies indicates:  No Known Allergies    Past Medical History:   Diagnosis Date    Allergy     seasonal    Cervical rib     Vitamin D deficiency      Past Surgical History:   Procedure Laterality Date    WISDOM TOOTH EXTRACTION  2007     Family History   Problem Relation Age of Onset    Breast cancer Maternal Grandmother     Cancer Maternal Grandmother     Cancer Father     Hyperlipidemia Father     Coronary artery disease Father     Heart disease Father     Thyroid disease Mother     Breast cancer Mother     Asthma Mother     Cancer Mother     No Known Problems Sister     Cancer Paternal Uncle     Prostate cancer Paternal Uncle     COPD Paternal Grandfather     Heart disease Paternal Grandfather     Colon cancer Neg Hx     Ovarian cancer Neg Hx      Social History     Tobacco Use    Smoking status: Every Day     Packs/day: 0.50     Years: 15.00     Pack years: 7.50     Types: Vaping with nicotine, Cigarettes    Smokeless tobacco: Never    Tobacco comments:     Stop smoking 2019   Substance Use  "Topics    Alcohol use: Yes     Alcohol/week: 5.0 standard drinks     Types: 5 Glasses of wine per week     Comment: social    Drug use: No        Review of Systems:  Review of Systems   Constitutional:  Positive for malaise/fatigue and weight loss.   Gastrointestinal:  Positive for blood in stool and diarrhea. Negative for constipation, heartburn, melena, nausea and vomiting.        Tenesmus    Musculoskeletal:  Negative for joint pain.   Skin:  Negative for rash.     OBJECTIVE:     Vital Signs (Most Recent)  Blood Pressure 107/66 (BP Location: Left arm, Patient Position: Sitting, BP Method: Small (Automatic))   Pulse 75   Height 5' 5" (1.651 m)   Weight 61.8 kg (136 lb 3.9 oz)   Body Mass Index 22.67 kg/m²     Physical Exam:  General: White female in no distress   Neuro: Alert and oriented to person, place, and time.  Moves all extremities.     HEENT: No icterus.  Trachea midline  Respiratory: Respirations are even and unlabored, no cough or audible wheezing  Skin: Warm dry and intact, No visible rashes, no jaundice    Labs reviewed today:  Lab Results   Component Value Date    WBC 5.31 06/23/2023    HGB 8.1 (L) 06/23/2023    HCT 27.9 (L) 06/23/2023     06/23/2023    CHOL 174 08/12/2015    TRIG 130 08/12/2015    HDL 59 08/12/2015    ALT 11 06/23/2023    AST 16 06/23/2023     06/23/2023    K 3.9 06/23/2023     06/23/2023    CREATININE 0.7 06/23/2023    BUN 12 06/23/2023    CO2 23 06/23/2023    TSH 1.378 06/23/2023       Imaging reviewed today:  none    Endoscopy reviewed today:  none    Anorectal Exam:    Anal Skin: External hemorrhoids    Digital Rectal Exam:  Resting Tone normal  Mass none  Tenderness  absent    Anoscopy:  Verbal consent was obtained.   Clear plastic anoscope inserted.    Hemorrhoids  present  Stigmata of bleeding  present  Stigmata of prolapsed  present  Distal rectal mucosa normal  - no proctitis noted    ASSESSMENT/PLAN:     Diagnoses and all orders for this " visit:    Hemorrhoids, unspecified hemorrhoid type  -     hydrocortisone (ANUSOL-HC) 2.5 % rectal cream; Place rectally 2 (two) times daily.    Fatigue, unspecified type  -     Ambulatory referral/consult to Gastroenterology    Iron deficiency anemia, unspecified iron deficiency anemia type    Abdominal pain, unspecified abdominal location    Bloody diarrhea          The patient was instructed to:  Schedule colonoscopy  Await pending stool studies including calprotectin  Treatment/follow up based on colonoscopy results      STEVE ChandP-MEY  Colon and Rectal Surgery

## 2023-06-29 NOTE — TELEPHONE ENCOUNTER
Spoke to Trice to schedule procedure(s) Colonoscopy       Physician to perform procedure(s) Dr. ROSAURA Vargas  Date of Procedure (s) 6/30/23  Arrival Time 8:30 AM  Time of Procedure(s) 9:30 AM   Location of Procedure(s) [place] 2nd Floor  Type of Rx Prep sent to patient: Sutab  Instructions provided to patient via [facility]    Patient was informed on the following information and verbalized understanding. Screening questionnaire reviewed with patient and complete. If procedure requires anesthesia, a responsible adult needs to be present to accompany the patient home, patient cannot drive after receiving anesthesia. Appointment details are tentative, especially check-in time. Patient will receive a prep-op call 4 days prior to confirm check-in time for procedure. If applicable the patient should contact their pharmacy to verify Rx for procedure prep is ready for pick-up. Patient was advised to call the scheduling department at [phone] if pharmacy states no Rx is available. Patient was advised to call the endoscopy scheduling department if any questions or concerns arise.      [facility] Scheduling Department

## 2023-06-30 ENCOUNTER — HOSPITAL ENCOUNTER (OUTPATIENT)
Dept: RADIOLOGY | Facility: HOSPITAL | Age: 38
Discharge: HOME | End: 2023-06-30
Attending: INTERNAL MEDICINE
Payer: COMMERCIAL

## 2023-06-30 ENCOUNTER — ANESTHESIA (OUTPATIENT)
Dept: ENDOSCOPY | Facility: HOSPITAL | Age: 38
End: 2023-06-30
Payer: COMMERCIAL

## 2023-06-30 ENCOUNTER — TELEPHONE (OUTPATIENT)
Dept: GASTROENTEROLOGY | Facility: CLINIC | Age: 38
End: 2023-06-30
Payer: COMMERCIAL

## 2023-06-30 ENCOUNTER — PATIENT MESSAGE (OUTPATIENT)
Dept: SURGERY | Facility: CLINIC | Age: 38
End: 2023-06-30
Payer: COMMERCIAL

## 2023-06-30 ENCOUNTER — TELEPHONE (OUTPATIENT)
Dept: ENDOSCOPY | Facility: HOSPITAL | Age: 38
End: 2023-06-30
Payer: COMMERCIAL

## 2023-06-30 ENCOUNTER — HOSPITAL ENCOUNTER (OUTPATIENT)
Facility: HOSPITAL | Age: 38
Discharge: HOME | End: 2023-06-30
Attending: INTERNAL MEDICINE | Admitting: INTERNAL MEDICINE
Payer: COMMERCIAL

## 2023-06-30 VITALS
SYSTOLIC BLOOD PRESSURE: 116 MMHG | TEMPERATURE: 98 F | DIASTOLIC BLOOD PRESSURE: 70 MMHG | WEIGHT: 134 LBS | HEIGHT: 65 IN | HEART RATE: 60 BPM | BODY MASS INDEX: 22.33 KG/M2

## 2023-06-30 DIAGNOSIS — R19.7 BLOODY DIARRHEA: Primary | ICD-10-CM

## 2023-06-30 DIAGNOSIS — K63.89 COLONIC MASS: ICD-10-CM

## 2023-06-30 DIAGNOSIS — K63.89 COLONIC MASS: Primary | ICD-10-CM

## 2023-06-30 PROCEDURE — 88305 TISSUE EXAM BY PATHOLOGIST: CPT | Mod: 26,,, | Performed by: PATHOLOGY

## 2023-06-30 PROCEDURE — 25500020 PHARM REV CODE 255: Performed by: INTERNAL MEDICINE

## 2023-06-30 PROCEDURE — 45381 PR COLONOSCPY,FLEX,W/DIR SUBMUC INJECT: ICD-10-PCS | Mod: 52,,, | Performed by: INTERNAL MEDICINE

## 2023-06-30 PROCEDURE — 45380 COLONOSCOPY AND BIOPSY: CPT | Mod: 52,,, | Performed by: INTERNAL MEDICINE

## 2023-06-30 PROCEDURE — 88305 TISSUE EXAM BY PATHOLOGIST: CPT | Performed by: PATHOLOGY

## 2023-06-30 PROCEDURE — 74177 CT CHEST ABDOMEN PELVIS WITH CONTRAST (XPD): ICD-10-PCS | Mod: 26,,, | Performed by: RADIOLOGY

## 2023-06-30 PROCEDURE — 71260 CT THORAX DX C+: CPT | Mod: TC

## 2023-06-30 PROCEDURE — 27201028 HC NEEDLE, SCLERO: Performed by: INTERNAL MEDICINE

## 2023-06-30 PROCEDURE — 63600175 PHARM REV CODE 636 W HCPCS: Performed by: NURSE ANESTHETIST, CERTIFIED REGISTERED

## 2023-06-30 PROCEDURE — 71260 CT THORAX DX C+: CPT | Mod: 26,,, | Performed by: RADIOLOGY

## 2023-06-30 PROCEDURE — 88342 IMHCHEM/IMCYTCHM 1ST ANTB: CPT | Performed by: PATHOLOGY

## 2023-06-30 PROCEDURE — 45380 PR COLONOSCOPY,BIOPSY: ICD-10-PCS | Mod: 52,,, | Performed by: INTERNAL MEDICINE

## 2023-06-30 PROCEDURE — 45381 COLONOSCOPY SUBMUCOUS NJX: CPT | Mod: 52,,, | Performed by: INTERNAL MEDICINE

## 2023-06-30 PROCEDURE — 88305 TISSUE EXAM BY PATHOLOGIST: ICD-10-PCS | Mod: 26,,, | Performed by: PATHOLOGY

## 2023-06-30 PROCEDURE — 37000008 HC ANESTHESIA 1ST 15 MINUTES: Performed by: INTERNAL MEDICINE

## 2023-06-30 PROCEDURE — 74177 CT ABD & PELVIS W/CONTRAST: CPT | Mod: 26,,, | Performed by: RADIOLOGY

## 2023-06-30 PROCEDURE — 27201012 HC FORCEPS, HOT/COLD, DISP: Performed by: INTERNAL MEDICINE

## 2023-06-30 PROCEDURE — 88342 CHG IMMUNOCYTOCHEMISTRY: ICD-10-PCS | Mod: 26,,, | Performed by: PATHOLOGY

## 2023-06-30 PROCEDURE — 37000009 HC ANESTHESIA EA ADD 15 MINS: Performed by: INTERNAL MEDICINE

## 2023-06-30 PROCEDURE — 45380 COLONOSCOPY AND BIOPSY: CPT | Mod: 74 | Performed by: INTERNAL MEDICINE

## 2023-06-30 PROCEDURE — 71260 CT CHEST ABDOMEN PELVIS WITH CONTRAST (XPD): ICD-10-PCS | Mod: 26,,, | Performed by: RADIOLOGY

## 2023-06-30 PROCEDURE — D9220A PRA ANESTHESIA: Mod: ,,, | Performed by: NURSE ANESTHETIST, CERTIFIED REGISTERED

## 2023-06-30 PROCEDURE — 88341 PR IHC OR ICC EACH ADD'L SINGLE ANTIBODY  STAINPR: ICD-10-PCS | Mod: 26,,, | Performed by: PATHOLOGY

## 2023-06-30 PROCEDURE — 88341 IMHCHEM/IMCYTCHM EA ADD ANTB: CPT | Mod: 26,,, | Performed by: PATHOLOGY

## 2023-06-30 PROCEDURE — 88341 IMHCHEM/IMCYTCHM EA ADD ANTB: CPT | Performed by: PATHOLOGY

## 2023-06-30 PROCEDURE — 88342 IMHCHEM/IMCYTCHM 1ST ANTB: CPT | Mod: 26,,, | Performed by: PATHOLOGY

## 2023-06-30 PROCEDURE — 27202363 HC INJECTION AGENT, SUBMUCOSAL, ANY: Performed by: INTERNAL MEDICINE

## 2023-06-30 PROCEDURE — D9220A PRA ANESTHESIA: ICD-10-PCS | Mod: ,,, | Performed by: NURSE ANESTHETIST, CERTIFIED REGISTERED

## 2023-06-30 PROCEDURE — 45381 COLONOSCOPY SUBMUCOUS NJX: CPT | Mod: 74 | Performed by: INTERNAL MEDICINE

## 2023-06-30 RX ORDER — PROPOFOL 10 MG/ML
VIAL (ML) INTRAVENOUS
Status: DISCONTINUED | OUTPATIENT
Start: 2023-06-30 | End: 2023-06-30

## 2023-06-30 RX ADMIN — PROPOFOL 200 MG: 10 INJECTION, EMULSION INTRAVENOUS at 09:06

## 2023-06-30 RX ADMIN — IOHEXOL 75 ML: 350 INJECTION, SOLUTION INTRAVENOUS at 03:06

## 2023-06-30 NOTE — ANESTHESIA POSTPROCEDURE EVALUATION
Anesthesia Post Evaluation    Patient: Flora Edmond    Procedure(s) Performed: Procedure(s) (LRB):  COLONOSCOPY (N/A)    Final Anesthesia Type: general      Patient location during evaluation: PACU  Patient participation: Yes- Able to Participate  Level of consciousness: awake and alert  Post-procedure vital signs: reviewed and stable  Pain management: adequate  Airway patency: patent    PONV status at discharge: No PONV  Anesthetic complications: no      Cardiovascular status: blood pressure returned to baseline  Respiratory status: unassisted  Hydration status: euvolemic            Vitals Value Taken Time   /69 06/30/23 1033   Temp 36.6 °C (97.9 °F) 06/30/23 1018   Pulse 82 06/30/23 1033   Resp 12 06/30/23 1035   SpO2 100% 06/30/23 1035         No case tracking events are documented in the log.      Pain/Julisa Score: Julisa Score: 8 (6/30/2023 10:18 AM)

## 2023-06-30 NOTE — TELEPHONE ENCOUNTER
----- Message from Andriy Vargas MD sent at 6/30/2023 10:16 AM CDT -----  Please schedule with  or  for rectosigmoid mass ASAP.

## 2023-06-30 NOTE — TRANSFER OF CARE
"Anesthesia Transfer of Care Note    Patient: Flora Edmond    Procedure(s) Performed: Procedure(s) (LRB):  COLONOSCOPY (N/A)    Patient location: PACU    Anesthesia Type: MAC    Transport from OR: Transported from OR on room air with adequate spontaneous ventilation    Post pain: adequate analgesia    Post assessment: no apparent anesthetic complications    Post vital signs: stable    Level of consciousness: responds to stimulation    Complications: none    Transfer of care protocol was followed      Last vitals:   Visit Vitals  Temp 36.9 °C (98.4 °F) (Temporal)   Ht 5' 5" (1.651 m)   Wt 60.8 kg (134 lb)   Breastfeeding No   BMI 22.30 kg/m²     "

## 2023-06-30 NOTE — INTERVAL H&P NOTE
The patient has been examined and the H&P has been reviewed:    I concur with the findings and no changes have occurred since H&P was written.    Procedure risks, benefits and alternative options discussed and understood by patient/family.    Colonoscopy: Bloody diarrhea  Sedation: GA  ASA: Per anesthesia  Mallampati: Per anesthesia      There are no hospital problems to display for this patient.

## 2023-06-30 NOTE — PROVATION PATIENT INSTRUCTIONS
Discharge Summary/Instructions after an Endoscopic Procedure  Patient Name: Flora Edmond  Patient MRN: [id number]  Patient YOB: 1985 Friday, June 30, 2023  Andriy Vargas MD  Dear patient,  As a result of recent federal legislation (The Federal Cures Act), you may   receive lab or pathology results from your procedure in your [facility]   account before your physician is able to contact you. Your physician or   their representative will relay the results to you with their   recommendations at their soonest availability.  Thank you,  RESTRICTIONS:  During your procedure today, you received medications for sedation.  These   medications may affect your judgment, balance and coordination.  Therefore,   for 24 hours, you have the following restrictions:   - DO NOT drive a car, operate machinery, make legal/financial decisions,   sign important papers or drink alcohol.    ACTIVITY:  Today: no heavy lifting, straining or running due to procedural   sedation/anesthesia.  The following day: return to full activity including work.  DIET:  Eat and drink normally unless instructed otherwise.     TREATMENT FOR COMMON SIDE EFFECTS:  - Mild abdominal pain, nausea, belching, bloating or excessive gas:  rest,   eat lightly and use a heating pad.  - Sore Throat: treat with throat lozenges and/or gargle with warm salt   water.  - Because air was used during the procedure, expelling large amounts of air   from your rectum or belching is normal.  - If a bowel prep was taken, you may not have a bowel movement for 1-3 days.    This is normal.  SYMPTOMS TO WATCH FOR AND REPORT TO YOUR PHYSICIAN:  1. Abdominal pain or bloating, other than gas cramps.  2. Chest pain.  3. Back pain.  4. Signs of infection such as: chills or fever occurring within 24 hours   after the procedure.  5. Rectal bleeding, which would show as bright red, maroon, or black stools.   (A tablespoon of blood from the rectum is not serious, especially  if   hemorrhoids are present.)  6. Vomiting.  7. Weakness or dizziness.  GO DIRECTLY TO THE NEAREST EMERGENCY ROOM IF YOU HAVE ANY OF THE FOLLOWING:      Difficulty breathing              Chills and/or fever over 101 F   Persistent vomiting and/or vomiting blood   Severe abdominal pain   Severe chest pain   Black, tarry stools   Bleeding- more than one tablespoon   Any other symptom or condition that you feel may need urgent attention  Your doctor recommends these additional instructions:  If any biopsies were taken, your doctors clinic will contact you in 1 to 2   weeks with any results.  - Patient has a contact number available for emergencies.  The signs and   symptoms of potential delayed complications were discussed with the   patient.  Return to normal activities tomorrow.  Written discharge   instructions were provided to the patient.   - Discharge patient to home.   - Resume previous diet.   - Continue present medications.   - Await pathology results.   - Refer to a colo-rectal surgeon.   - Repeat colonoscopy in 1 year for surveillance.  For questions, problems or results please call your physician - Andriy Vargas MD at Work:  [phone].  [facility], EMERGENCY ROOM PHONE NUMBER: [phone]  IF A COMPLICATION OR EMERGENCY SITUATION ARISES AND YOU ARE UNABLE TO REACH   YOUR PHYSICIAN - GO DIRECTLY TO THE EMERGENCY ROOM.  Andriy Vargas MD  6/30/2023 10:29:14 AM  This report has been verified and signed electronically.  Dear patient,  As a result of recent federal legislation (The Federal Cures Act), you may   receive lab or pathology results from your procedure in your [facility]   account before your physician is able to contact you. Your physician or   their representative will relay the results to you with their   recommendations at their soonest availability.  Thank you,  PROVATION

## 2023-06-30 NOTE — PLAN OF CARE
Patient discharge instuctions given with copy of instructions and endo report from doctor. Patient verbalized understanding and all questions answered. VS stable, IV removed and bleeding controlled with gauze and coband. Patient left via ambulation accompanied by mother.

## 2023-06-30 NOTE — TELEPHONE ENCOUNTER
"Called Patient P/ "Please schedule labs and CT scan as ordered. Please schedule visit with CRS physician  or  for colon mass ASAP"    No answer LVM with call back number   "

## 2023-06-30 NOTE — PROGRESS NOTES
Attempted to contact patient regarding colonoscopy results and unsuccessful attempt with voice message left to have patient call back with good call back time and number.    Glenn Vega MD

## 2023-07-03 ENCOUNTER — PATIENT MESSAGE (OUTPATIENT)
Dept: SURGERY | Facility: CLINIC | Age: 38
End: 2023-07-03
Payer: COMMERCIAL

## 2023-07-06 ENCOUNTER — TELEPHONE (OUTPATIENT)
Dept: SURGERY | Facility: CLINIC | Age: 38
End: 2023-07-06
Payer: COMMERCIAL

## 2023-07-06 ENCOUNTER — PATIENT MESSAGE (OUTPATIENT)
Dept: SURGERY | Facility: CLINIC | Age: 38
End: 2023-07-06
Payer: COMMERCIAL

## 2023-07-06 NOTE — PROGRESS NOTES
CRS Office Visit History and Physical      SUBJECTIVE:     Chief Complaint: Colon cancer    History of Present Illness:  The patient is known patient to this practice.   Course is as follows:  Patient is a 37 y.o. female presents with 3 months of diarrhea, bleeding, and 10lbs weight loss. She noted the onset of symptoms after a covid infection, but due to their persistence, she underwent colonoscopy. At that time, a partially obstructing lesion was noted in the sigmoid colon that was not traversable. Pathology was notable for invasive adenocarcinoma (MMR pending). No family history of colorectal, ovarian, endometrial cancer. No prior abdominal surgery.  Works in public health.  Accompanied by sister and mother, who are both physicians.  Also has an appointment next week at [facility].       Last Colonoscopy:   (6/30/2023)  Hemorrhoids were found on perianal exam.   A frond-like/villous partially obstructing large mass was found in the recto-sigmoid colon at about 30 cm from the anal verge. The mass was partially circumferential (involving two-thirds of the lumen circumference). No bleeding was present. Biopsies were taken with a cold forceps for histology. Estimated blood loss was minimal. Area just distal to the mass was tattooed with an injection of 2 mL of Spot (carbon black).     She denies smoking        Pathology:  1. Sigmoid colon, mass, biopsy:   Invasive adenocarcinoma.     She reports a family history of breast cancer, prostate cancer, and Factor 9 deficiency.     Review of patient's allergies indicates:  No Known Allergies    Past Medical History:   Diagnosis Date    Allergy     seasonal    Cervical rib     Vitamin D deficiency      Past Surgical History:   Procedure Laterality Date    COLONOSCOPY N/A 6/30/2023    Procedure: COLONOSCOPY;  Surgeon: Andriy Vargas MD;  Location: [facility] ENDOSCOPY;  Service: Endoscopy;  Laterality: N/A;  Sutab  Prep instructions given to pt in clinic -    WISDOM TOOTH  EXTRACTION  2007     Family History   Problem Relation Age of Onset    Breast cancer Maternal Grandmother     Cancer Maternal Grandmother     Cancer Father     Hyperlipidemia Father     Coronary artery disease Father     Heart disease Father     Thyroid disease Mother     Breast cancer Mother     Asthma Mother     Cancer Mother     No Known Problems Sister     Cancer Paternal Uncle     Prostate cancer Paternal Uncle     COPD Paternal Grandfather     Heart disease Paternal Grandfather     Colon cancer Neg Hx     Ovarian cancer Neg Hx      Social History     Tobacco Use    Smoking status: Every Day     Packs/day: 0.50     Years: 15.00     Pack years: 7.50     Types: Vaping with nicotine, Cigarettes    Smokeless tobacco: Never    Tobacco comments:     Stop smoking 2019   Substance Use Topics    Alcohol use: Yes     Alcohol/week: 5.0 standard drinks     Types: 5 Glasses of wine per week     Comment: social    Drug use: No        Review of Systems:  ROS    OBJECTIVE:     Vital Signs (Most Recent)  /72   Pulse 100   Wt 61.6 kg (135 lb 11.2 oz)   BMI 22.58 kg/m²     Physical Exam:  General: White female in no distress   Neuro: Alert and oriented x 4.  Moves all extremities.     HEENT: No icterus.  Trachea midline  Respiratory: Respirations are even and unlabored  Cardiac: Regular rate  Abdomen: Soft, non-distended, non-tender  Extremities: Warm dry and intact  Skin: No rashes    Anorectal:   External exam: Swollen external hemorrhoid tissue  Digital exam revealed normal tone. No palpable masses.    PROCEDURE: FLEXIBLE SIGMOIDOSCOPY:    After explaining the procedure, indications, risks (including but not limited to bleeding and perforation), and benefits, verbal informed consent was obtained. Flexible sigmoidoscopy was carried out.  Proceeded to 20cm.  At 20cm we encountered a tight bend that was not able to be traversed 2/2 patient discomfort. The tattoo was seen at 20cm (level of 3rd valve).  Not able to  visualize tumor itself. Small (4-5mm) semipedunculated polyp seen in mid-rectum.          No complications were encountered;  the procedure was well tolerated.  The patient is asked to call if any unusual pains or bleeding occur.  May resume normal diet and activities at this time.     Labs:  Lab Results   Component Value Date    WBC 5.47 06/30/2023    HGB 7.8 (L) 06/30/2023    HCT 27.6 (L) 06/30/2023    MCV 76 (L) 06/30/2023     (H) 06/30/2023       Lab Results   Component Value Date    CEA <1.7 06/30/2023     CMP  Sodium   Date Value Ref Range Status   06/23/2023 138 136 - 145 mmol/L Final     Potassium   Date Value Ref Range Status   06/23/2023 3.9 3.5 - 5.1 mmol/L Final     Chloride   Date Value Ref Range Status   06/23/2023 108 95 - 110 mmol/L Final     CO2   Date Value Ref Range Status   06/23/2023 23 23 - 29 mmol/L Final     Glucose   Date Value Ref Range Status   06/23/2023 86 70 - 110 mg/dL Final     BUN   Date Value Ref Range Status   06/23/2023 12 6 - 20 mg/dL Final     Creatinine   Date Value Ref Range Status   06/23/2023 0.7 0.5 - 1.4 mg/dL Final     Calcium   Date Value Ref Range Status   06/23/2023 9.4 8.7 - 10.5 mg/dL Final     Total Protein   Date Value Ref Range Status   06/23/2023 7.4 6.0 - 8.4 g/dL Final     Albumin   Date Value Ref Range Status   06/23/2023 4.0 3.5 - 5.2 g/dL Final     Total Bilirubin   Date Value Ref Range Status   06/23/2023 0.4 0.1 - 1.0 mg/dL Final     Comment:     For infants and newborns, interpretation of results should be based  on gestational age, weight and in agreement with clinical  observations.    Premature Infant recommended reference ranges:  Up to 24 hours.............<8.0 mg/dL  Up to 48 hours............<12.0 mg/dL  3-5 days..................<15.0 mg/dL  6-29 days.................<15.0 mg/dL       Alkaline Phosphatase   Date Value Ref Range Status   06/23/2023 54 (L) 55 - 135 U/L Final     AST   Date Value Ref Range Status   06/23/2023 16 10 - 40 U/L  Final     ALT   Date Value Ref Range Status   06/23/2023 11 10 - 44 U/L Final     Anion Gap   Date Value Ref Range Status   06/23/2023 7 (L) 8 - 16 mmol/L Final     eGFR   Date Value Ref Range Status   06/23/2023 >60.0 >60 mL/min/1.73 m^2 Final         Imaging:  CT c/a/p (6/30/2023)  Lung bases are clear.  Liver, gallbladder, biliary tree, spleen, stomach, pancreas, and duodenum show nothing unusual.  The vessels enhance normally.  The adrenal glands are not enlarged.  The kidneys enhance normally.  No para-aortic, retroperitoneal, or mesenteric adenopathy is seen.  There is an IUD.  There is a uterine leiomyoma.  There is a long segment of diffuse thickening of the sigmoid colon.  Small right ovarian cyst.  Bones demonstrate nothing focal.  No mediastinal, hilar, or axillary adenopathy is seen.  Lungs demonstrate no suspicious nodules, masses, or infiltrates.    ** I have reviewed these images **    ASSESSMENT/PLAN:     36yo F w/ newly diagnosed distal sigmoid colon adenocarcinoma.  No evidence of distant metastatic disease seen on CT, although there are some ?pericolic deposits in addition to possible involvement of left ovary.  Discussed that I would recommend a pelvic MRI, which she already has scheduled at [facility] next week. Symptoms and endoscopy suggestive of symptoms of partial obstruction from the lesion. Discussed laparoscopic sigmoid colectomy with possible left salping oophorectomy and ureteral catheter placement (pending above) and on-table colonoscopy.  Will refer to Genetics, MMR still pending. Patient also interested in Factor (level given family history (ordered today). Will follow-up with her by phone after results from MRI and [facility] visit.      Alda Partida MD  Staff Surgeon, Colon and Rectal Surgery  [facility]

## 2023-07-07 ENCOUNTER — TELEPHONE (OUTPATIENT)
Dept: SURGERY | Facility: CLINIC | Age: 38
End: 2023-07-07
Payer: COMMERCIAL

## 2023-07-07 ENCOUNTER — OFFICE VISIT (OUTPATIENT)
Dept: SURGERY | Facility: CLINIC | Age: 38
End: 2023-07-07
Attending: COLON & RECTAL SURGERY
Payer: COMMERCIAL

## 2023-07-07 VITALS
WEIGHT: 135.69 LBS | HEART RATE: 100 BPM | DIASTOLIC BLOOD PRESSURE: 72 MMHG | BODY MASS INDEX: 22.58 KG/M2 | SYSTOLIC BLOOD PRESSURE: 113 MMHG

## 2023-07-07 DIAGNOSIS — C18.7 MALIGNANT NEOPLASM OF SIGMOID COLON: Primary | ICD-10-CM

## 2023-07-07 PROCEDURE — 99999 PR PBB SHADOW E&M-EST. PATIENT-LVL IV: ICD-10-PCS | Mod: PBBFAC,,, | Performed by: COLON & RECTAL SURGERY

## 2023-07-07 PROCEDURE — 1159F PR MEDICATION LIST DOCUMENTED IN MEDICAL RECORD: ICD-10-PCS | Mod: CPTII,S$GLB,, | Performed by: COLON & RECTAL SURGERY

## 2023-07-07 PROCEDURE — 1160F RVW MEDS BY RX/DR IN RCRD: CPT | Mod: CPTII,S$GLB,, | Performed by: COLON & RECTAL SURGERY

## 2023-07-07 PROCEDURE — 99214 PR OFFICE/OUTPT VISIT, EST, LEVL IV, 30-39 MIN: ICD-10-PCS | Mod: S$GLB,,, | Performed by: COLON & RECTAL SURGERY

## 2023-07-07 PROCEDURE — 1159F MED LIST DOCD IN RCRD: CPT | Mod: CPTII,S$GLB,, | Performed by: COLON & RECTAL SURGERY

## 2023-07-07 PROCEDURE — 3008F BODY MASS INDEX DOCD: CPT | Mod: CPTII,S$GLB,, | Performed by: COLON & RECTAL SURGERY

## 2023-07-07 PROCEDURE — 3078F DIAST BP <80 MM HG: CPT | Mod: CPTII,S$GLB,, | Performed by: COLON & RECTAL SURGERY

## 2023-07-07 PROCEDURE — 3008F PR BODY MASS INDEX (BMI) DOCUMENTED: ICD-10-PCS | Mod: CPTII,S$GLB,, | Performed by: COLON & RECTAL SURGERY

## 2023-07-07 PROCEDURE — 3078F PR MOST RECENT DIASTOLIC BLOOD PRESSURE < 80 MM HG: ICD-10-PCS | Mod: CPTII,S$GLB,, | Performed by: COLON & RECTAL SURGERY

## 2023-07-07 PROCEDURE — 99214 OFFICE O/P EST MOD 30 MIN: CPT | Mod: S$GLB,,, | Performed by: COLON & RECTAL SURGERY

## 2023-07-07 PROCEDURE — 3074F SYST BP LT 130 MM HG: CPT | Mod: CPTII,S$GLB,, | Performed by: COLON & RECTAL SURGERY

## 2023-07-07 PROCEDURE — 3074F PR MOST RECENT SYSTOLIC BLOOD PRESSURE < 130 MM HG: ICD-10-PCS | Mod: CPTII,S$GLB,, | Performed by: COLON & RECTAL SURGERY

## 2023-07-07 PROCEDURE — 99999 PR PBB SHADOW E&M-EST. PATIENT-LVL IV: CPT | Mod: PBBFAC,,, | Performed by: COLON & RECTAL SURGERY

## 2023-07-07 PROCEDURE — 1160F PR REVIEW ALL MEDS BY PRESCRIBER/CLIN PHARMACIST DOCUMENTED: ICD-10-PCS | Mod: CPTII,S$GLB,, | Performed by: COLON & RECTAL SURGERY

## 2023-07-07 NOTE — TELEPHONE ENCOUNTER
Spoke with Rufina in pathology regarding preliminary results for clinic visit today. Message left for Dr. Beard (pathologist) to reach out directly to Dr. Partida (cell phone number provided).

## 2023-07-10 ENCOUNTER — PATIENT MESSAGE (OUTPATIENT)
Dept: HEMATOLOGY/ONCOLOGY | Facility: CLINIC | Age: 38
End: 2023-07-10
Payer: COMMERCIAL

## 2023-07-10 LAB
COMMENT: NORMAL
FINAL PATHOLOGIC DIAGNOSIS: NORMAL
GROSS: NORMAL
Lab: NORMAL
MICROSCOPIC EXAM: NORMAL
SUPPLEMENTAL DIAGNOSIS: NORMAL

## 2023-07-13 ENCOUNTER — PATIENT MESSAGE (OUTPATIENT)
Dept: SURGERY | Facility: HOSPITAL | Age: 38
End: 2023-07-13
Payer: COMMERCIAL

## 2023-07-19 ENCOUNTER — OFFICE VISIT (OUTPATIENT)
Dept: HEMATOLOGY/ONCOLOGY | Facility: CLINIC | Age: 38
End: 2023-07-19
Payer: COMMERCIAL

## 2023-07-19 VITALS
WEIGHT: 137.44 LBS | DIASTOLIC BLOOD PRESSURE: 58 MMHG | HEIGHT: 65 IN | OXYGEN SATURATION: 98 % | TEMPERATURE: 98 F | BODY MASS INDEX: 22.9 KG/M2 | RESPIRATION RATE: 18 BRPM | HEART RATE: 98 BPM | SYSTOLIC BLOOD PRESSURE: 106 MMHG

## 2023-07-19 DIAGNOSIS — C18.7 MALIGNANT NEOPLASM OF SIGMOID COLON: Primary | ICD-10-CM

## 2023-07-19 DIAGNOSIS — M25.551 RIGHT HIP PAIN: ICD-10-CM

## 2023-07-19 DIAGNOSIS — D50.0 IRON DEFICIENCY ANEMIA DUE TO CHRONIC BLOOD LOSS: ICD-10-CM

## 2023-07-19 DIAGNOSIS — E55.9 VITAMIN D DEFICIENCY: ICD-10-CM

## 2023-07-19 PROCEDURE — 3008F PR BODY MASS INDEX (BMI) DOCUMENTED: ICD-10-PCS | Mod: CPTII,S$GLB,, | Performed by: INTERNAL MEDICINE

## 2023-07-19 PROCEDURE — 3074F SYST BP LT 130 MM HG: CPT | Mod: CPTII,S$GLB,, | Performed by: INTERNAL MEDICINE

## 2023-07-19 PROCEDURE — 1159F PR MEDICATION LIST DOCUMENTED IN MEDICAL RECORD: ICD-10-PCS | Mod: CPTII,S$GLB,, | Performed by: INTERNAL MEDICINE

## 2023-07-19 PROCEDURE — 3078F PR MOST RECENT DIASTOLIC BLOOD PRESSURE < 80 MM HG: ICD-10-PCS | Mod: CPTII,S$GLB,, | Performed by: INTERNAL MEDICINE

## 2023-07-19 PROCEDURE — 1159F MED LIST DOCD IN RCRD: CPT | Mod: CPTII,S$GLB,, | Performed by: INTERNAL MEDICINE

## 2023-07-19 PROCEDURE — 99205 OFFICE O/P NEW HI 60 MIN: CPT | Mod: S$GLB,,, | Performed by: INTERNAL MEDICINE

## 2023-07-19 PROCEDURE — 3074F PR MOST RECENT SYSTOLIC BLOOD PRESSURE < 130 MM HG: ICD-10-PCS | Mod: CPTII,S$GLB,, | Performed by: INTERNAL MEDICINE

## 2023-07-19 PROCEDURE — 99999 PR PBB SHADOW E&M-EST. PATIENT-LVL IV: CPT | Mod: PBBFAC,,, | Performed by: INTERNAL MEDICINE

## 2023-07-19 PROCEDURE — 99999 PR PBB SHADOW E&M-EST. PATIENT-LVL IV: ICD-10-PCS | Mod: PBBFAC,,, | Performed by: INTERNAL MEDICINE

## 2023-07-19 PROCEDURE — 3078F DIAST BP <80 MM HG: CPT | Mod: CPTII,S$GLB,, | Performed by: INTERNAL MEDICINE

## 2023-07-19 PROCEDURE — 99205 PR OFFICE/OUTPT VISIT, NEW, LEVL V, 60-74 MIN: ICD-10-PCS | Mod: S$GLB,,, | Performed by: INTERNAL MEDICINE

## 2023-07-19 PROCEDURE — 3008F BODY MASS INDEX DOCD: CPT | Mod: CPTII,S$GLB,, | Performed by: INTERNAL MEDICINE

## 2023-07-19 RX ORDER — ENOXAPARIN SODIUM 100 MG/ML
40 INJECTION SUBCUTANEOUS
COMMUNITY
Start: 2023-07-18 | End: 2023-08-13

## 2023-07-19 RX ORDER — DOCUSATE SODIUM 100 MG/1
100 CAPSULE, LIQUID FILLED ORAL
COMMUNITY
Start: 2023-07-17

## 2023-07-19 RX ORDER — IBUPROFEN 200 MG
400 TABLET ORAL
COMMUNITY
Start: 2023-07-17

## 2023-07-19 RX ORDER — TRAMADOL HYDROCHLORIDE 50 MG/1
50 TABLET ORAL EVERY 6 HOURS PRN
COMMUNITY
Start: 2023-07-17 | End: 2023-08-02 | Stop reason: SDUPTHER

## 2023-07-19 RX ORDER — ACETAMINOPHEN 325 MG/1
650 TABLET ORAL
COMMUNITY
Start: 2023-07-17

## 2023-07-19 RX ORDER — METHOCARBAMOL 500 MG/1
TABLET, FILM COATED ORAL
COMMUNITY
Start: 2023-07-17

## 2023-07-19 NOTE — PROGRESS NOTES
MEDICAL ONCOLOGY - NEW PATIENT VISIT    Reason for visit: Colon cancer    Best Contact Phone Number(s): [phone] (home)      Cancer/Stage/TNM:    Cancer Staging   No matching staging information was found for the patient.     Oncology History    No history exists.        HPI:   37 y.o. female with thoracic outlet syndrome, LEROY and vitamin D deficiency who presents for further management of recently diagnosed sigmoid colon cancer.  She was experiencing abdominal pain, diarrhea, hematochezia and urgency that began around March 2023 when she was diagnosed with Covid 19.  She was seen by GI in June and underwent colonoscopy on 6/30/23 with Dr. Vargas which revealed a partially obstructing tumor 30 cm from the AV at the rectosigmoid colon.  Biopsy consistent with invasive adenocarcinoma, pMMR.  She saw Dr. Partida on 7/7/23 after CT showed possible pericolic deposits and possible left ovary involvement but without clear distant metastatic disease.  Dr. Partida discussed surgical resection.  Ms Edmond went to [facility] and had an MRI which was read as cT4a cN1 disease.  She underwent laparoscopic sigmoidectomy with Dr. Nesbitt on 7/15/23.  Had return of bowel function and was discharged.  Drove back to [place] yesterday. Had low grade fever last night. Feels well today.  Last BM was yesterday, no nausea, vomiting.      Denies family history of colorectal or endometrial cancer.  Mother had breast cancer in her 70s. Father had prostate cancer.  Her mother is a retired medical oncologist.  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    History has been obtained by chart review and discussion with the patient.    ROS:   Review of Systems   Constitutional:  Positive for malaise/fatigue and weight loss.   HENT:  Negative for sore throat.    Eyes:  Negative for blurred vision and pain.   Respiratory:  Negative for cough and shortness of breath.    Cardiovascular:  Negative for chest pain and leg swelling.    Gastrointestinal:  Negative for abdominal pain, constipation, diarrhea, nausea and vomiting.   Genitourinary:  Negative for dysuria and frequency.   Musculoskeletal:  Positive for joint pain (R hip). Negative for back pain, falls and myalgias.   Skin:  Negative for rash.   Neurological:  Negative for weakness and headaches.   Endo/Heme/Allergies:  Does not bruise/bleed easily.   Psychiatric/Behavioral:  Negative for depression. The patient is not nervous/anxious.    All other systems reviewed and are negative.    Past Medical History:   Past Medical History:   Diagnosis Date    Allergy     seasonal    Cervical rib     Vitamin D deficiency         Past Surgical History:   Past Surgical History:   Procedure Laterality Date    COLONOSCOPY N/A 6/30/2023    Procedure: COLONOSCOPY;  Surgeon: Andriy Vargas MD;  Location: [facility];  Service: Endoscopy;  Laterality: N/A;  Sutab  Prep instructions given to pt in clinic -Jm    WISDOM TOOTH EXTRACTION  2007        Family History:   Family History   Problem Relation Age of Onset    Breast cancer Maternal Grandmother 61    Hemophilia Father         Factor IX    Prostate cancer Father 72        Adena 6 or 7    Hyperlipidemia Father     Coronary artery disease Father     Heart disease Father     Thyroid disease Mother     Breast cancer Mother 72        triple negative, CHEK2 VUS    Asthma Mother     No Known Problems Sister     Prostate cancer Paternal Uncle 55    Prostate cancer Paternal Grandfather 70    COPD Paternal Grandfather     Heart disease Paternal Grandfather     Hemophilia Other     Breast cancer Other         dx @ 60s    Colon cancer Neg Hx     Ovarian cancer Neg Hx         Social History:   Social History     Tobacco Use    Smoking status: Former     Types: Vaping with nicotine    Smokeless tobacco: Never    Tobacco comments:     Stop smoking 2019   Substance Use Topics    Alcohol use: Yes     Alcohol/week: 5.0 standard drinks     Types: 5 Glasses of  "wine per week     Comment: social        I have reviewed and updated the patient's past medical, surgical, family and social histories.    Allergies:   Review of patient's allergies indicates:  No Known Allergies     Medications:   Current Outpatient Medications   Medication Sig Dispense Refill    acetaminophen (TYLENOL) 325 MG tablet Take 650 mg by mouth.      docusate sodium (COLACE) 100 MG capsule Take 100 mg by mouth.      enoxaparin (LOVENOX) 40 mg/0.4 mL Syrg Inject 40 mg into the skin.      ibuprofen (ADVIL,MOTRIN) 200 MG tablet Take 400 mg by mouth.      ferrous sulfate (FEOSOL) 325 mg (65 mg iron) Tab tablet Take 1 tablet (325 mg total) by mouth daily with breakfast. 90 tablet 3    hydrocortisone (ANUSOL-HC) 2.5 % rectal cream Place rectally 2 (two) times daily. 28 g 2    Lactobacillus acidophilus (PROBIOTIC ORAL) Take by mouth.      levonorgestreL (MIRENA) 21 mcg/24 hours (8 yrs) 52 mg IUD by Intrauterine route.      loratadine-pseudoephedrine 5-120 mg (CLARITIN-D 12-HOUR) 5-120 mg per tablet Take by mouth every 12 (twelve) hours.      methocarbamoL (ROBAXIN) 500 MG Tab Take by mouth.      traMADoL (ULTRAM) 50 mg tablet Take 50 mg by mouth every 6 (six) hours as needed.       No current facility-administered medications for this visit.        Physical Exam:   BP (!) 106/58 (BP Location: Left arm, Patient Position: Sitting, BP Method: Medium (Automatic))   Pulse 98   Temp 98.1 °F (36.7 °C) (Oral)   Resp 18   Ht 5' 5" (1.651 m)   Wt 62.4 kg (137 lb 7.3 oz)   SpO2 98%   BMI 22.87 kg/m²      ECOG Performance status: 0            Physical Exam  Vitals reviewed.   Constitutional:       General: She is not in acute distress.     Appearance: Normal appearance. She is normal weight.   HENT:      Head: Normocephalic and atraumatic.      Right Ear: External ear normal.      Left Ear: External ear normal.      Nose: Nose normal.      Mouth/Throat:      Mouth: Mucous membranes are moist.      Pharynx: Oropharynx " is clear. No posterior oropharyngeal erythema.   Eyes:      General: No scleral icterus.     Extraocular Movements: Extraocular movements intact.      Conjunctiva/sclera: Conjunctivae normal.      Pupils: Pupils are equal, round, and reactive to light.   Cardiovascular:      Rate and Rhythm: Normal rate and regular rhythm.      Pulses: Normal pulses.      Heart sounds: Normal heart sounds.   Pulmonary:      Effort: Pulmonary effort is normal.      Breath sounds: Normal breath sounds. No wheezing or rales.   Abdominal:      General: Bowel sounds are normal. There is no distension.      Palpations: Abdomen is soft.      Tenderness: There is no abdominal tenderness.   Musculoskeletal:         General: No swelling. Normal range of motion.      Cervical back: Normal range of motion and neck supple.   Skin:     General: Skin is warm.      Coloration: Skin is not jaundiced.      Findings: No erythema or rash.   Neurological:      General: No focal deficit present.      Mental Status: She is alert and oriented to person, place, and time. Mental status is at baseline.      Gait: Gait normal.   Psychiatric:         Mood and Affect: Mood normal.         Behavior: Behavior normal.         Thought Content: Thought content normal.         Judgment: Judgment normal.         Labs:   No results found for this or any previous visit (from the past 48 hour(s)).     I have reviewed the pertinent labs from 7/11/23 which are notable for microcytic anemia, thrombocytosis, ferritin of 4.  CEA <  1.7.    Imaging:       I have personally reviewed the 6/30/23 CT imaging which is notable for sigmoid colon thickening.  No clear evidence of distant metastatic disease.    Path:   Final Pathologic Diagnosis     1. Sigmoid colon, mass, biopsy:   Invasive adenocarcinoma.  VC      Comment: Interp By Herbert Beard M.D., Signed on 07/10/2023 at 10:18   Supplemental Diagnosis     Immunohistochemistry (IHC) Testing for Mismatch Repair (MMR) Proteins      MLH1   Intact nuclear expression     MSH2   Intact nuclear expression     MSH6   Intact nuclear expression     PMS2   Intact nuclear expression     Background nonneoplastic tissue/internal control with intact nuclear expression     IHC Interpretation   No loss of nuclear expression of MMR proteins: low probability of microsatellite instability-high (MSI-H)              Assessment:       1. Malignant neoplasm of sigmoid colon    2. Right hip pain    3. Vitamin D deficiency    4. Iron deficiency anemia due to chronic blood loss          Plan:             # Sigmoid colon cancer  She presented with several months of symptoms and was found to have a sigmoid colon adenocarcinoma, pMMR on 6/30/23 colonoscopy.  She had no clear evidence of distant metastatic disease on her pre-operative CT imaging. Saw Dr. Partida here and then went to [facility] for surgical consultation.  MRI there showed T4a N1 disease.  Underwent laparoscopic sigmoidectomy with Dr. Nesbitt on 7/15/23 without need for ostomy.  Regained bowel function and returned to [place] 7/18/23.    Awaiting results of her pathology.  Suspect based on pre-operative staging on imaging that she will benefit from adjuvant chemotherapy.  If she had an R0 resection, I don't see a likely role for radiation given that her primary tumor seemed to very clearly be in the sigmoid colon.    I did discuss potential chemotherapy regimens including FOLFOX and CAPOX and the likely indication for 6 months of chemo given the probable T4 disease.  We also discussed potential [employer] ctDNA monitoring and port placement prior to chemotherapy.    She is interested in fertility preservation.  I will provide her information for [facility].    # R hip pain  Chronic, intermittent.  Will order HLA B27 given possible sacroilitis.    # Vit D Deficiency  She is off vitamin D.  Had some prior osteopenia that improved.    # Iron deficiency anemia  Will set up for Injectafer or other  IV iron formulation.  Secondary to chronic blood loss from tumor.     Follow up: 2-3 weeks     The above information has been reviewed with the patient and all questions have been answered to their apparent satisfaction.  They understand that they can call the clinic with any questions.    At least 88 minutes were spent today on this encounter including face to face time with the patient, data gathering/interpretation and documentation. Greater than 50% of this time involved counseling or coordination of care. I have provided the patient with an opportunity to ask questions and have all questions answered to patient's satisfaction.       Surinder Youssef MD  Hematology/Oncology  [facility]      Route Chart for Scheduling    Med Onc Chart Routing      Follow up with physician 2 weeks.   Follow up with MIN    Infusion scheduling note   needs Injectafer authorized and two weekly infusions scheduled please   Injection scheduling note    Labs    Imaging    Pharmacy appointment    Other referrals              Therapy Plan Information  Medications  ferric carboxymaltose (INJECTAFER) 750 mg in sodium chloride 0.9% 265 mL infusion  750 mg, Intravenous, 1 time a week  IV Fluids  0.9%  NaCl infusion  Intravenous, 1 time a week  Anaphylaxis/Hypersensitivity  EPINEPHrine (EPIPEN) 0.3 mg/0.3 mL pen injection 0.3 mg  0.3 mg, Intramuscular, PRN  diphenhydrAMINE injection 50 mg  50 mg, Intravenous, PRN  hydrocortisone sodium succinate injection 100 mg  100 mg, Intravenous, PRN  Flushes  sodium chloride 0.9% flush 10 mL  10 mL, Intravenous, 1 time a week  heparin, porcine (PF) 100 unit/mL injection flush 500 Units  500 Units, Intravenous, 1 time a week  sodium chloride 0.9% 100 mL flush bag  Intravenous, 1 time a week

## 2023-07-20 ENCOUNTER — OFFICE VISIT (OUTPATIENT)
Dept: HEMATOLOGY/ONCOLOGY | Facility: CLINIC | Age: 38
End: 2023-07-20
Attending: COLON & RECTAL SURGERY
Payer: COMMERCIAL

## 2023-07-20 ENCOUNTER — PATIENT MESSAGE (OUTPATIENT)
Dept: HEMATOLOGY/ONCOLOGY | Facility: CLINIC | Age: 38
End: 2023-07-20

## 2023-07-20 DIAGNOSIS — Z80.3 FAMILY HISTORY OF BREAST CANCER: ICD-10-CM

## 2023-07-20 DIAGNOSIS — Z80.42 FAMILY HISTORY OF PROSTATE CANCER: ICD-10-CM

## 2023-07-20 DIAGNOSIS — C18.7 MALIGNANT NEOPLASM OF SIGMOID COLON: Primary | ICD-10-CM

## 2023-07-20 PROBLEM — D50.0 IRON DEFICIENCY ANEMIA DUE TO CHRONIC BLOOD LOSS: Status: ACTIVE | Noted: 2023-07-20

## 2023-07-20 PROCEDURE — 99499 NO LOS: ICD-10-PCS | Mod: 95,,, | Performed by: GENETIC COUNSELOR, MS

## 2023-07-20 PROCEDURE — 99499 UNLISTED E&M SERVICE: CPT | Mod: 95,,, | Performed by: GENETIC COUNSELOR, MS

## 2023-07-20 PROCEDURE — 96040 PR GENETIC COUNSELING, EACH 30 MIN: ICD-10-PCS | Mod: 95,,, | Performed by: GENETIC COUNSELOR, MS

## 2023-07-20 PROCEDURE — 96040 PR GENETIC COUNSELING, EACH 30 MIN: CPT | Mod: 95,,, | Performed by: GENETIC COUNSELOR, MS

## 2023-07-20 RX ORDER — SODIUM CHLORIDE 0.9 % (FLUSH) 0.9 %
10 SYRINGE (ML) INJECTION
Status: CANCELLED | OUTPATIENT
Start: 2023-08-03

## 2023-07-20 RX ORDER — EPINEPHRINE 0.3 MG/.3ML
0.3 INJECTION SUBCUTANEOUS ONCE AS NEEDED
Status: CANCELLED | OUTPATIENT
Start: 2023-08-03

## 2023-07-20 RX ORDER — SODIUM CHLORIDE 9 MG/ML
INJECTION, SOLUTION INTRAVENOUS CONTINUOUS
Status: CANCELLED | OUTPATIENT
Start: 2023-08-03

## 2023-07-20 RX ORDER — HEPARIN 100 UNIT/ML
5 SYRINGE INTRAVENOUS
Status: CANCELLED | OUTPATIENT
Start: 2023-08-03

## 2023-07-20 RX ORDER — DIPHENHYDRAMINE HYDROCHLORIDE 50 MG/ML
50 INJECTION INTRAMUSCULAR; INTRAVENOUS ONCE AS NEEDED
Status: CANCELLED | OUTPATIENT
Start: 2023-08-03

## 2023-07-20 NOTE — PROGRESS NOTES
Cancer Genetics  Hereditary and High-Risk Clinic  Department of Hematology and Oncology  [facility]    Date of Service:  23  Visit Provider:  Elvia Christianson, [facility], [facility]    Patient ID  Name: Flora Edmond    : 1985    MRN: [id number]      Referring Provider  Alda Partida MD  [address] [place] [address]    Televisit Information  The patient location is: [place], [place].    The chief complaint leading to consultation is:  As below.    Visit type: audiovisual.    Face-to-face time with patient:  Approximately 38 minutes.    Approximately 70 minutes in total were spent on this encounter, which includes face-to-face time and non-face-to-face time preparing to see the patient (e.g., review of records and tests), obtaining and/or reviewing separately obtained history, documenting clinical information in the electronic or other health record, independently interpreting results (not separately reported) and communicating results to the patient/family/caregiver, or care coordination (not separately reported).  Each patient to whom he or she provides medical services by telemedicine is:  (1) informed of the relationship between the physician and patient and the respective role of any other health care provider with respect to management of the patient; and (2) notified that he or she may decline to receive medical services by telemedicine and may withdraw from such care at any time.    FELI Carnes is a 38yo female with a recent diagnosis of colon cancer. While her tumor was MMR-proficient, she still meets NCCN criteria for genetic testing based on her very young age at diagnosis. She also meets criteria based on her maternal family history of breast cancer and paternal family history of prostate cancer. A sample will be submitted on 23 to [employer] for CancerNext-Expanded +RNAinsight panel testing, along with informed consent and insurance  "information once her blood draw has been scheduled.    FOCUSED PERSONAL HISTORY     Chief Complaint: Genetic Evaluation (Colon cancer)    History of Present Illness (HPI):  Flora Edmond ("Trice"), 37 y.o., assigned female sex at birth, is established with the [facility] but new to me.  She was referred by  Dr. Partida from Colon and Rectal Surgery  for hereditary cancer risk assessment given her recent diagnosis of invasive adenocarcinoma of the sigmoid colon after colonoscopy with biopsy on 23. She underwent sigmoid colon resection at [facility] on 7/15/23 and final pathology is still pending. MMR IHC performed on her biopsy specimen demonstrated normal expression.    Skin shave biopsy on 22: Melanocytic nevus     Tobacco Use  Tobacco Use: Medium Risk    Smoking Tobacco Use: Former    Smokeless Tobacco Use: Never    Passive Exposure: Not on file     FAMILY HISTORY         Trice reported her family history of cancer as follows:  Mother: 78yo who was diagnosed with triple negative breast cancer at 73yo; [employer] breast panel identified a VUS in CHEK2 ([id number])  Maternal grandmother: diagnosed with breast cancer at 62yo and  at 61yo  Maternal great-aunt (grandmother's sister): diagnosed with breast cancer in her 60s and  at 91yo due to unrelated causes    Father: 75yo who was diagnosed with prostate cancer (Emi 6 or 7) ay 73yo; no genetic testing  Paternal uncle: 76yo who was diagnosed with non-metastatic prostate cancer at ~54yo  Paternal grandfather: diagnosed with prostate cancer at ~71yo and  at 78yo due to unrelated causes    Trice also reported that her father and one of her two nephews has Hemophilia B (Factor IX deficiency). We discussed that Trice and her sisters are obligate carriers of this X-linked condition, and they have a 50% chance to pass on the F9 mutation to each child.    A family history of birth defects, intellectual disability, " SIDS, sudden early death, multiple miscarriages and consanguinity were denied. Please refer to above pedigree for further details. A larger copy has been scanned in the Media tab.     DISCUSSION     Approximately 10% of colorectal cancers are hereditary. The majority of hereditary colorectal cancer is caused by mutations in MLH1, MSH2, MSH6, PMS2 or EPCAM, which cause Campbell syndrome. The normal MMR IHC in her colon tumor drastically reduces the likelihood of Campbell syndrome, but it does not eliminate it. Additionally, there are a number of other highly and moderately penetrant genes that are associated with increased risks of colorectal cancer, such as AXIN2, CHEK2, GALNT12, PTEN, STK11, and TP53. Trice meets NCCN criteria for genetic testing based on her very young age at colon cancer diagnosis. She also separately meets NCCN criteria for genetic testing based on having three maternal relatives with breast cancer (including a triple negative breast cancer) and based on having three paternal relatives with prostate cancer.Therefore, s/he was offered phenotype-driven and broad panel testing. Trice opted for the [employer] [employer]kt-[facility]EX [facility] panel through [employer] of the following 77 genes associated with hereditary breast, brain, colorectal, gynecologic, kidney, neuroendocrine, pancreatic, prostate, skin and other cancers:    AIP, ALK, APC, DIPESH, AXIN2, BAP1, BARD1, BLM, BMPR1A, BRCA1, BRCA2, BRIP1, CDC73, CDH1, CDK4, CDKN1B, CDKN2A, CHEK2, CTNNA1, DICER1, EGFR, EGLN1, EPCAM, FANCC, FH, FLCN, GALNT12, GREM1, HOXB13, KIF1B, KIT, LZTR1, MAX, MEN1, MET, MITF, MLH1, MSH2, MSH3, MSH6, MUTYH, NBN, NF1, NF2, NTHL1, PALB2, PDGFRA, PHOX2B, PMS2, POLD1, POLE, POT1, CHDJH8R, PTCH1, PTEN, RAD51C, RAD51D, RB1, RECQL, RET, SDHA, SDHAF2, SDHB, SDHC, SDHD, SMAD4, SMARCA4, SMARCB1, SMARCE1, STK11, SUFU, HBOC253, TP53, TSC1, TSC2, VHL, XRCC2     We reviewed that mutations in the highly penetrant genes put an individual at a  significantly increased risk of colorectal and/or other cancers.  There are established screening and surgery guidelines for these syndromes. Mutations in the moderately penetrant genes increase the risk of colorectal and/or other cancers, but less is understood regarding their role in cancer risk. There may not be standard screening or management guidelines for individuals who have mutations in these genes. We discussed the autosomal dominant inheritance of most of these conditions, autosomal recessive inheritance of some of these conditions, and that if Trice tests positive for a mutation in one of these genes, then there would be a 50% chance her first degree relatives (parents, siblings, children) could also have inherited the same mutation. Individuals in her family could consider undergoing predictive genetic testing at an appropriate age to determine their mutation status and their lifetime risk of cancer.     Furthermore, we discussed the psychosocial implications of a positive result, including anxiety, fear and guilt if a mutation is passed on to a future child. Trice did not express concern.    PREMM5 = 6.4% likelihood of have a mutation in one of the Campbell syndrome genes. The NCCN recommends germline genetic testing with at least a 2.5% risk.    The potential outcomes of testing, including the high VUS (variant of unknown significance) rate seen in panel testing, were reviewed and implications were discussed. There is also a possibility for the patient to incur out-of-pocket costs related to this testing. Issues regarding insurance discrimination were discussed. JONATHAN protects against employment and health insurance discrimination, but it does not apply to life insurance, long term care or disability policies. It also does not apply to  personnel or employers with less than 15 employees. Trice appeared to have a good understanding of the information as she asked appropriate questions.  A sample  "will be submitted on 8/2/23 to [email or web address] once a blood draw has been scheduled.  Trice's results should be available in approximately 3 weeks from the sample submission date.  In the meantime, she is welcome to contact me if she has any questions, concerns, or updates to her family history.     Trice received comprehensive counseling regarding panel testing and has elected to proceed with this testing.     ASSESSMENT / PLAN      Flora Edmond ("Trice"), 37 y.o., presented today for hereditary cancer risk assessment and  genetic counseling based on her recent diagnosis of very early-onset colon cancer. She also has a strong maternal family history of breast cancer and a strong paternal family history of prostate cancer.  She meets NCCN criteria for genetic testing and will tentatively have a sample submitted on 8/2/23 to [email or web address]. I will contact Trice once her results are available, and in the meantime, she will send me her mother's previous genetic test report so I can look up more about the CHEK2 VUS that was found in her.      ICD-10-CM ICD-9-CM   1. Malignant neoplasm of sigmoid colon  C18.7 153.3   2. Family history of breast cancer  Z80.3 V16.3   3. Family history of prostate cancer  Z80.42 V16.42     1. Malignant neoplasm of sigmoid colon  - Ambulatory referral/consult to Genetics  - Genetic Misc Sendout Test, Blood; Future    2. Family history of breast cancer  - Genetic Misc Sendout Test, Blood; Future    3. Family history of prostate cancer  - Genetic Misc Sendout Test, Blood; Future       Genetic Test Information  Testing lab: [email or web address]   Test panel: CancerNext-Expanded +RNAinsight  (Core:  Campbell)   ICD-10 code(s): C18.7, Z80.3, Z80.42   Verbal informed consent: Obtained   Written informed consent: Will be obtained at time of blood draw   Specimen type: Blood  (Patient denies blood disorders that would necessitate a skin fibroblast specimen)   Specimen collection by: [facility] "   Specimen collection date: 08/02/2023   Results expected by: Approximately 2-3 weeks after the genetic testing lab receives the specimen   Results disclosure plan: Post-test visit if positive or complex result; otherwise, results will be communicated through phone call       Follow-up:  Follow up if genetic test results are positive.    Questions were encouraged and answered to the patient's satisfaction, and she verbalized understanding of the information and agreement with the plan.         Approximately 38 minutes were spent face-to-face with the patient.  Approximately 70 minutes in total were spent on this encounter, which includes face-to-face time and non-face-to-face time preparing to see the patient (e.g., review of tests), obtaining and/or reviewing separately obtained history, documenting clinical information in the electronic or other health record, independently interpreting results (not separately reported) and communicating results to the patient/family/caregiver, or care coordination (not separately reported).     This assessment is based on the history and reports provided, as well as the current scientific knowledge regarding cancer genetics.         Elvia Christianson, [facility], [facility]  Senior Genetic Counselor, Hereditary and High-Risk Clinic  Department of Hematology and Oncology  [facility]        CC:  Dr. Surinder Youssef, Dr. Alda Partida

## 2023-07-21 ENCOUNTER — TELEPHONE (OUTPATIENT)
Dept: HEMATOLOGY/ONCOLOGY | Facility: CLINIC | Age: 38
End: 2023-07-21
Payer: COMMERCIAL

## 2023-07-21 NOTE — TELEPHONE ENCOUNTER
I spoke with Trice again on 7/21/23 to inform her that her follow-up with Dr. Youssef, her HLA testing and her hereditary cancer genetic testing have all been scheduled for 8/2/23 at the [facility]. She expressed understanding that she should come to the 3rd floor to have her blood drawn, so our clinic navigator, Sun Gloria, can go through the consent forms with her and make sure the correct genetic test kit is used. Trice expressed understanding of the plan.  -Elvia Christianson, MGC, GC

## 2023-08-02 ENCOUNTER — OFFICE VISIT (OUTPATIENT)
Dept: HEMATOLOGY/ONCOLOGY | Facility: CLINIC | Age: 38
End: 2023-08-02
Payer: COMMERCIAL

## 2023-08-02 ENCOUNTER — LAB VISIT (OUTPATIENT)
Dept: LAB | Facility: HOSPITAL | Age: 38
End: 2023-08-02
Attending: INTERNAL MEDICINE
Payer: COMMERCIAL

## 2023-08-02 ENCOUNTER — CLINICAL SUPPORT (OUTPATIENT)
Dept: HEMATOLOGY/ONCOLOGY | Facility: CLINIC | Age: 38
End: 2023-08-02
Payer: COMMERCIAL

## 2023-08-02 VITALS
RESPIRATION RATE: 18 BRPM | OXYGEN SATURATION: 98 % | DIASTOLIC BLOOD PRESSURE: 71 MMHG | WEIGHT: 134.38 LBS | HEIGHT: 65 IN | SYSTOLIC BLOOD PRESSURE: 116 MMHG | BODY MASS INDEX: 22.39 KG/M2 | TEMPERATURE: 98 F | HEART RATE: 76 BPM

## 2023-08-02 DIAGNOSIS — D50.0 IRON DEFICIENCY ANEMIA DUE TO CHRONIC BLOOD LOSS: ICD-10-CM

## 2023-08-02 DIAGNOSIS — M25.551 RIGHT HIP PAIN: ICD-10-CM

## 2023-08-02 DIAGNOSIS — Z80.42 FAMILY HISTORY OF PROSTATE CANCER: ICD-10-CM

## 2023-08-02 DIAGNOSIS — C18.7 MALIGNANT NEOPLASM OF SIGMOID COLON: Primary | ICD-10-CM

## 2023-08-02 DIAGNOSIS — E55.9 VITAMIN D DEFICIENCY: ICD-10-CM

## 2023-08-02 DIAGNOSIS — Z80.3 FAMILY HISTORY OF BREAST CANCER: ICD-10-CM

## 2023-08-02 DIAGNOSIS — C18.7 MALIGNANT NEOPLASM OF SIGMOID COLON: ICD-10-CM

## 2023-08-02 LAB
ALBUMIN SERPL BCP-MCNC: 4 G/DL (ref 3.5–5.2)
ALP SERPL-CCNC: 61 U/L (ref 55–135)
ALT SERPL W/O P-5'-P-CCNC: 44 U/L (ref 10–44)
ANION GAP SERPL CALC-SCNC: 6 MMOL/L (ref 8–16)
AST SERPL-CCNC: 41 U/L (ref 10–40)
BILIRUB SERPL-MCNC: 0.3 MG/DL (ref 0.1–1)
BUN SERPL-MCNC: 8 MG/DL (ref 6–20)
CALCIUM SERPL-MCNC: 10.5 MG/DL (ref 8.7–10.5)
CHLORIDE SERPL-SCNC: 105 MMOL/L (ref 95–110)
CO2 SERPL-SCNC: 28 MMOL/L (ref 23–29)
CREAT SERPL-MCNC: 0.7 MG/DL (ref 0.5–1.4)
ERYTHROCYTE [DISTWIDTH] IN BLOOD BY AUTOMATED COUNT: 19.6 % (ref 11.5–14.5)
EST. GFR  (NO RACE VARIABLE): >60 ML/MIN/1.73 M^2
GLUCOSE SERPL-MCNC: 84 MG/DL (ref 70–110)
HCT VFR BLD AUTO: 30.6 % (ref 37–48.5)
HGB BLD-MCNC: 9.2 G/DL (ref 12–16)
IMM GRANULOCYTES # BLD AUTO: 0.01 K/UL (ref 0–0.04)
MCH RBC QN AUTO: 22.4 PG (ref 27–31)
MCHC RBC AUTO-ENTMCNC: 30.1 G/DL (ref 32–36)
MCV RBC AUTO: 75 FL (ref 82–98)
NEUTROPHILS # BLD AUTO: 3 K/UL (ref 1.8–7.7)
PLATELET # BLD AUTO: 615 K/UL (ref 150–450)
PMV BLD AUTO: 9.5 FL (ref 9.2–12.9)
POTASSIUM SERPL-SCNC: 4.3 MMOL/L (ref 3.5–5.1)
PROT SERPL-MCNC: 7.6 G/DL (ref 6–8.4)
RBC # BLD AUTO: 4.11 M/UL (ref 4–5.4)
SODIUM SERPL-SCNC: 139 MMOL/L (ref 136–145)
WBC # BLD AUTO: 5.28 K/UL (ref 3.9–12.7)

## 2023-08-02 PROCEDURE — 99999 PR PBB SHADOW E&M-EST. PATIENT-LVL IV: CPT | Mod: PBBFAC,,, | Performed by: INTERNAL MEDICINE

## 2023-08-02 PROCEDURE — 36415 COLL VENOUS BLD VENIPUNCTURE: CPT | Performed by: COLON & RECTAL SURGERY

## 2023-08-02 PROCEDURE — 1159F MED LIST DOCD IN RCRD: CPT | Mod: CPTII,S$GLB,, | Performed by: INTERNAL MEDICINE

## 2023-08-02 PROCEDURE — 3008F BODY MASS INDEX DOCD: CPT | Mod: CPTII,S$GLB,, | Performed by: INTERNAL MEDICINE

## 2023-08-02 PROCEDURE — 99215 PR OFFICE/OUTPT VISIT, EST, LEVL V, 40-54 MIN: ICD-10-PCS | Mod: S$GLB,,, | Performed by: INTERNAL MEDICINE

## 2023-08-02 PROCEDURE — 3074F SYST BP LT 130 MM HG: CPT | Mod: CPTII,S$GLB,, | Performed by: INTERNAL MEDICINE

## 2023-08-02 PROCEDURE — 81374 HLA I TYPING 1 ANTIGEN LR: CPT | Mod: PO | Performed by: INTERNAL MEDICINE

## 2023-08-02 PROCEDURE — 85027 COMPLETE CBC AUTOMATED: CPT | Performed by: INTERNAL MEDICINE

## 2023-08-02 PROCEDURE — 1159F PR MEDICATION LIST DOCUMENTED IN MEDICAL RECORD: ICD-10-PCS | Mod: CPTII,S$GLB,, | Performed by: INTERNAL MEDICINE

## 2023-08-02 PROCEDURE — 99999 PR PBB SHADOW E&M-EST. PATIENT-LVL IV: ICD-10-PCS | Mod: PBBFAC,,, | Performed by: INTERNAL MEDICINE

## 2023-08-02 PROCEDURE — 3078F PR MOST RECENT DIASTOLIC BLOOD PRESSURE < 80 MM HG: ICD-10-PCS | Mod: CPTII,S$GLB,, | Performed by: INTERNAL MEDICINE

## 2023-08-02 PROCEDURE — 3078F DIAST BP <80 MM HG: CPT | Mod: CPTII,S$GLB,, | Performed by: INTERNAL MEDICINE

## 2023-08-02 PROCEDURE — 3074F PR MOST RECENT SYSTOLIC BLOOD PRESSURE < 130 MM HG: ICD-10-PCS | Mod: CPTII,S$GLB,, | Performed by: INTERNAL MEDICINE

## 2023-08-02 PROCEDURE — 3008F PR BODY MASS INDEX (BMI) DOCUMENTED: ICD-10-PCS | Mod: CPTII,S$GLB,, | Performed by: INTERNAL MEDICINE

## 2023-08-02 PROCEDURE — 99215 OFFICE O/P EST HI 40 MIN: CPT | Mod: S$GLB,,, | Performed by: INTERNAL MEDICINE

## 2023-08-02 PROCEDURE — 80053 COMPREHEN METABOLIC PANEL: CPT | Performed by: INTERNAL MEDICINE

## 2023-08-02 RX ORDER — TRAMADOL HYDROCHLORIDE 50 MG/1
50 TABLET ORAL EVERY 8 HOURS PRN
Qty: 20 TABLET | Refills: 0 | Status: SHIPPED | OUTPATIENT
Start: 2023-08-02

## 2023-08-02 RX ORDER — HEPARIN 100 UNIT/ML
500 SYRINGE INTRAVENOUS
Status: CANCELLED | OUTPATIENT
Start: 2023-08-07

## 2023-08-02 RX ORDER — ONDANSETRON 8 MG/1
8 TABLET, ORALLY DISINTEGRATING ORAL EVERY 8 HOURS PRN
Qty: 30 TABLET | Refills: 0 | Status: SHIPPED | OUTPATIENT
Start: 2023-08-02

## 2023-08-02 RX ORDER — DIPHENHYDRAMINE HYDROCHLORIDE 50 MG/ML
50 INJECTION INTRAMUSCULAR; INTRAVENOUS ONCE AS NEEDED
Status: CANCELLED | OUTPATIENT
Start: 2023-08-07

## 2023-08-02 RX ORDER — SODIUM CHLORIDE 0.9 % (FLUSH) 0.9 %
10 SYRINGE (ML) INJECTION
Status: CANCELLED | OUTPATIENT
Start: 2023-08-07

## 2023-08-02 RX ORDER — EPINEPHRINE 0.3 MG/.3ML
0.3 INJECTION SUBCUTANEOUS ONCE AS NEEDED
Status: CANCELLED | OUTPATIENT
Start: 2023-08-07

## 2023-08-02 NOTE — PROGRESS NOTES
MEDICAL ONCOLOGY - ESTABLISHED PATIENT VISIT    Reason for visit: Colon cancer    Best Contact Phone Number(s): [phone] (home)      Cancer/Stage/TNM:    Cancer Staging   No matching staging information was found for the patient.     Oncology History    No history exists.        Interim History:   37 y.o. female with thoracic outlet syndrome, LEROY and vitamin D deficiency who presents for further management of recently diagnosed sigmoid colon cancer.  She underwent laparoscopic sigmoidectomy with Dr. Nesbitt on 7/15/23.  Had virtual visit with him to discuss pathology - pT3N0 with no high risk features, negative margins.  Will finish course of Lovenox.  Had Signatera ctDNA drawn yesterday via mobile phlebotomy.    She is accompanied by her friend today.  ECOG PS 0.    ROS:   Review of Systems   Constitutional:  Positive for malaise/fatigue and weight loss.   HENT:  Negative for sore throat.    Eyes:  Negative for blurred vision and pain.   Respiratory:  Negative for cough and shortness of breath.    Cardiovascular:  Negative for chest pain and leg swelling.   Gastrointestinal:  Negative for abdominal pain, constipation, diarrhea, nausea and vomiting.   Genitourinary:  Negative for dysuria and frequency.   Musculoskeletal:  Positive for joint pain (R hip). Negative for back pain, falls and myalgias.   Skin:  Negative for rash.   Neurological:  Negative for weakness and headaches.   Endo/Heme/Allergies:  Does not bruise/bleed easily.   Psychiatric/Behavioral:  Negative for depression. The patient is not nervous/anxious.    All other systems reviewed and are negative.      Past Medical History:   Past Medical History:   Diagnosis Date    Allergy     seasonal    Cervical rib     Vitamin D deficiency         Past Surgical History:   Past Surgical History:   Procedure Laterality Date    COLONOSCOPY N/A 6/30/2023    Procedure: COLONOSCOPY;  Surgeon: Andriy aVrgas MD;  Location: [facility] ENDOSCOPY;  Service: Endoscopy;   Laterality: N/A;  Sutab  Prep instructions given to pt in clinic -Daniele    WISDOM TOOTH EXTRACTION  2007        Family History:   Family History   Problem Relation Age of Onset    Breast cancer Maternal Grandmother 61    Hemophilia Father         Factor IX    Prostate cancer Father 72        [place] 6 or 7    Hyperlipidemia Father     Coronary artery disease Father     Heart disease Father     Thyroid disease Mother     Breast cancer Mother 72        triple negative, CHEK2 VUS    Asthma Mother     No Known Problems Sister     Prostate cancer Paternal Uncle 55    Prostate cancer Paternal Grandfather 70    COPD Paternal Grandfather     Heart disease Paternal Grandfather     Hemophilia Other     Breast cancer Other         dx @ 60s    Colon cancer Neg Hx     Ovarian cancer Neg Hx         Social History:   Social History     Tobacco Use    Smoking status: Former     Current packs/day: 0.00     Types: Vaping with nicotine    Smokeless tobacco: Never    Tobacco comments:     Stop smoking 2019   Substance Use Topics    Alcohol use: Yes     Alcohol/week: 5.0 standard drinks of alcohol     Types: 5 Glasses of wine per week     Comment: social        I have reviewed and updated the patient's past medical, surgical, family and social histories.    Allergies:   Review of patient's allergies indicates:  No Known Allergies     Medications:   Current Outpatient Medications   Medication Sig Dispense Refill    acetaminophen (TYLENOL) 325 MG tablet Take 650 mg by mouth.      docusate sodium (COLACE) 100 MG capsule Take 100 mg by mouth.      enoxaparin (LOVENOX) 40 mg/0.4 mL Syrg Inject 40 mg into the skin.      ferrous sulfate (FEOSOL) 325 mg (65 mg iron) Tab tablet Take 1 tablet (325 mg total) by mouth daily with breakfast. 90 tablet 3    hydrocortisone (ANUSOL-HC) 2.5 % rectal cream Place rectally 2 (two) times daily. 28 g 2    ibuprofen (ADVIL,MOTRIN) 200 MG tablet Take 400 mg by mouth.      Lactobacillus acidophilus (PROBIOTIC ORAL)  "Take by mouth.      levonorgestreL (MIRENA) 21 mcg/24 hours (8 yrs) 52 mg IUD by Intrauterine route.      loratadine-pseudoephedrine 5-120 mg (CLARITIN-D 12-HOUR) 5-120 mg per tablet Take by mouth every 12 (twelve) hours.      methocarbamoL (ROBAXIN) 500 MG Tab Take by mouth.      ondansetron (ZOFRAN-ODT) 8 MG TbDL Take 1 tablet (8 mg total) by mouth every 8 (eight) hours as needed (nausea). 30 tablet 0    traMADoL (ULTRAM) 50 mg tablet Take 1 tablet (50 mg total) by mouth every 8 (eight) hours as needed for Pain. 20 tablet 0     No current facility-administered medications for this visit.        Physical Exam:   /71 (BP Location: Left arm, Patient Position: Sitting, BP Method: Medium (Automatic))   Pulse 76   Temp 98 °F (36.7 °C) (Oral)   Resp 18   Ht 5' 5" (1.651 m)   Wt 61 kg (134 lb 5.9 oz)   SpO2 98%   BMI 22.36 kg/m²      ECOG Performance status: 0            Physical Exam  Vitals reviewed.   Constitutional:       General: She is not in acute distress.     Appearance: Normal appearance. She is normal weight.   HENT:      Head: Normocephalic and atraumatic.      Right Ear: External ear normal.      Left Ear: External ear normal.      Nose: Nose normal.      Mouth/Throat:      Mouth: Mucous membranes are moist.      Pharynx: Oropharynx is clear. No posterior oropharyngeal erythema.   Eyes:      General: No scleral icterus.     Extraocular Movements: Extraocular movements intact.      Conjunctiva/sclera: Conjunctivae normal.      Pupils: Pupils are equal, round, and reactive to light.   Cardiovascular:      Rate and Rhythm: Normal rate and regular rhythm.      Pulses: Normal pulses.      Heart sounds: Normal heart sounds.   Pulmonary:      Effort: Pulmonary effort is normal.      Breath sounds: Normal breath sounds. No wheezing or rales.   Abdominal:      General: Bowel sounds are normal. There is no distension.      Palpations: Abdomen is soft.      Tenderness: There is no abdominal tenderness. "   Musculoskeletal:         General: No swelling. Normal range of motion.      Cervical back: Normal range of motion and neck supple.   Skin:     General: Skin is warm.      Coloration: Skin is not jaundiced.      Findings: No erythema or rash.   Neurological:      General: No focal deficit present.      Mental Status: She is alert and oriented to person, place, and time. Mental status is at baseline.      Gait: Gait normal.   Psychiatric:         Mood and Affect: Mood normal.         Behavior: Behavior normal.         Thought Content: Thought content normal.         Judgment: Judgment normal.           Labs:   Recent Results (from the past 48 hour(s))   Genetic Misc Sendout Test, Blood    Collection Time: 08/02/23  2:21 PM   Result Value Ref Range    Miscellaneous Genetic Test Name See BELOW    CBC Oncology    Collection Time: 08/02/23  2:21 PM   Result Value Ref Range    WBC 5.28 3.90 - 12.70 K/uL    RBC 4.11 4.00 - 5.40 M/uL    Hemoglobin 9.2 (L) 12.0 - 16.0 g/dL    Hematocrit 30.6 (L) 37.0 - 48.5 %    MCV 75 (L) 82 - 98 fL    MCH 22.4 (L) 27.0 - 31.0 pg    MCHC 30.1 (L) 32.0 - 36.0 g/dL    RDW 19.6 (H) 11.5 - 14.5 %    Platelets 615 (H) 150 - 450 K/uL    MPV 9.5 9.2 - 12.9 fL    Gran # (ANC) 3.0 1.8 - 7.7 K/uL    Immature Grans (Abs) 0.01 0.00 - 0.04 K/uL   Comprehensive Metabolic Panel    Collection Time: 08/02/23  2:21 PM   Result Value Ref Range    Sodium 139 136 - 145 mmol/L    Potassium 4.3 3.5 - 5.1 mmol/L    Chloride 105 95 - 110 mmol/L    CO2 28 23 - 29 mmol/L    Glucose 84 70 - 110 mg/dL    BUN 8 6 - 20 mg/dL    Creatinine 0.7 0.5 - 1.4 mg/dL    Calcium 10.5 8.7 - 10.5 mg/dL    Total Protein 7.6 6.0 - 8.4 g/dL    Albumin 4.0 3.5 - 5.2 g/dL    Total Bilirubin 0.3 0.1 - 1.0 mg/dL    Alkaline Phosphatase 61 55 - 135 U/L    AST 41 (H) 10 - 40 U/L    ALT 44 10 - 44 U/L    eGFR >60.0 >60 mL/min/1.73 m^2    Anion Gap 6 (L) 8 - 16 mmol/L        I have reviewed the pertinent labs from 8/2/23 which are notable for  improved microcytic anemia, thrombocytosis.  Normal Creatinine and mildly elevated AST.  CEA <  1.7.    Imaging:       I have personally reviewed the 6/30/23 CT imaging which is notable for sigmoid colon thickening.  No clear evidence of distant metastatic disease.    Path:   Final Pathologic Diagnosis     1. Sigmoid colon, mass, biopsy:   Invasive adenocarcinoma.  VC      Comment: Interp By Herbert Beard M.D., Signed on 07/10/2023 at 10:18   Supplemental Diagnosis     Immunohistochemistry (IHC) Testing for Mismatch Repair (MMR) Proteins     MLH1   Intact nuclear expression     MSH2   Intact nuclear expression     MSH6   Intact nuclear expression     PMS2   Intact nuclear expression     Background nonneoplastic tissue/internal control with intact nuclear expression     IHC Interpretation   No loss of nuclear expression of MMR proteins: low probability of microsatellite instability-high (MSI-H)          7/15/23 Sigmoidectomy:     COLON AND RECTUM: Resection, Including Transanal Disk Excision of Rectal Neoplasms   COLON AND RECTUM: RESECTION - All Specimens   8th Edition - Protocol posted: 6/22/2022     SPECIMEN      Procedure:    Sigmoidectomy     TUMOR      Tumor Site:    Sigmoid colon      Histologic Type:    Adenocarcinoma      Histologic Grade:    G2, moderately differentiated      Tumor Size:    Greatest dimension (Centimeters): 5.5 cm      Tumor Extent:    Invades through muscularis propria into the pericolonic or perirectal tissue      Macroscopic Tumor Perforation:    Not identified      Lymphovascular Invasion:    Not identified      Perineural Invasion:    Not identified      Tumor Bud Score:    Low (0-4)      Treatment Effect:    No known presurgical therapy     MARGINS      Margin Status for Invasive Carcinoma:    All margins negative for invasive carcinoma        Closest Margin(s) to Invasive Carcinoma:    Proximal        Distance from Invasive Carcinoma to Closest Margin:    2.8 cm      Margin Status  "for Non-Invasive Tumor:    All margins negative for high-grade dysplasia / intramucosal carcinoma and low-grade dysplasia     REGIONAL LYMPH NODES      Regional Lymph Node Status:            :    All regional lymph nodes negative for tumor        Number of Lymph Nodes Examined:    55      Tumor Deposits:    Not identified     PATHOLOGIC STAGE CLASSIFICATION (pTNM, AJCC 8th Edition)      Reporting of pT, pN, and (when applicable) pM categories is based on information available to the pathologist at the time the report is issued. As per the AJCC (Chapter 1, 8th Ed.) it is the managing physician's responsibility to establish the final pathologic stage based upon all pertinent information, including but potentially not limited to this pathology report.      pT Category:    pT3      pN Category:    pN0            Assessment:       1. Malignant neoplasm of sigmoid colon    2. Right hip pain    3. Vitamin D deficiency    4. Iron deficiency anemia due to chronic blood loss            Plan:             # Sigmoid colon cancer  She presented with several months of symptoms and was found to have a sigmoid colon adenocarcinoma, pMMR on 6/30/23 colonoscopy.  She had no clear evidence of distant metastatic disease on her pre-operative CT imaging. Saw Dr. Partida here and then went to [facility] for surgical consultation.  MRI there showed T4a N1 disease.  Underwent laparoscopic sigmoidectomy with Dr. Nesbitt on 7/15/23 without need for ostomy.  Regained bowel function and returned to [facility] 7/18/23.    Pathology from sigmoidectomy showed pT3N0 tumor with 55 negative lymph nodes, no LVI or PNI, negative margins.  Low tumor budding score, no perforation.    I discussed with her that the data are mixed on "low risk" stage II colon adenocarcinoma, pMMR, but I generally recommend active surveillance alone.  Another reasonable approach is six months of single agent capecitabine or 5-FU/LV.  There is not a survival benefit from the " addition of oxaliplatin in this setting as per the MOSAIC study.  She is considering her options.    She underwent ctDNA testing with Signatera on 8/1/23 with mobile phlebotomy.  If she has + ctDNA I would obviously be inclined to give her adjuvant chemotherapy.  The NRG-GI-005 study is currently suspended though this would have been a good option otherwise.    She will tentatively plan to return in 3 months with CBC, CMP, CEA.  Will discuss schedule of completion colonoscopy with Dr. Partida.    # R hip pain  Chronic, intermittent.  Ordered HLA B27 given possible sacroilitis.    # Vit D Deficiency  She is off vitamin D.  Had some prior osteopenia that improved.    # Iron deficiency anemia  To start Venofer tomorrow. Will start with 3 doses of 300 mg.    Follow up: 3 months.    The above information has been reviewed with the patient and all questions have been answered to their apparent satisfaction.  They understand that they can call the clinic with any questions.    At least 70 minutes were spent today on this encounter including face to face time with the patient, data gathering/interpretation and documentation. Greater than 50% of this time involved counseling or coordination of care. I have provided the patient with an opportunity to ask questions and have all questions answered to patient's satisfaction.       Surinder Youssef MD  Hematology/Oncology  [facility]      Route Chart for Scheduling    Med Onc Chart Routing      Follow up with physician 3 months.   Follow up with MIN    Infusion scheduling note   please add another weekly Venofer infusion to her schedule (has 2, needs 3rd).   Injection scheduling note    Labs CBC, CMP, CEA, ferritin and iron and TIBC   Scheduling:  Preferred lab:  Lab interval:     Imaging    Pharmacy appointment    Other referrals                Therapy Plan Information  Medications  iron sucrose (VENOFER) 300 mg in sodium chloride 0.9% 250 mL  IVPB  300 mg, Intravenous, 1 time a week  Anaphylaxis/Hypersensitivity  EPINEPHrine (EPIPEN) 0.3 mg/0.3 mL pen injection 0.3 mg  0.3 mg, Intramuscular, PRN  diphenhydrAMINE injection 50 mg  50 mg, Intravenous, PRN  hydrocortisone sodium succinate injection 100 mg  100 mg, Intravenous, PRN  Flushes  sodium chloride 0.9% 250 mL flush bag  Intravenous, 1 time a week  sodium chloride 0.9% flush 10 mL  10 mL, Intravenous, 1 time a week  heparin, porcine (PF) 100 unit/mL injection flush 500 Units  500 Units, Intravenous, 1 time a week  alteplase injection 2 mg  2 mg, Intra-Catheter, 1 time a week

## 2023-08-02 NOTE — PROGRESS NOTES
Met with the patient to review and sign consents for genetic testing through [facility]. Reviewed follow up timeline and procedures, and escorted the patient to the lobby.

## 2023-08-02 NOTE — Clinical Note
Dr. Delicia Escoto wanted her to get her completion colonoscopy in about 3 months.  She wanted me to ask you to help schedule for you to do it. Thanks, Frank

## 2023-08-03 ENCOUNTER — INFUSION (OUTPATIENT)
Dept: INFUSION THERAPY | Facility: HOSPITAL | Age: 38
End: 2023-08-03
Payer: COMMERCIAL

## 2023-08-03 VITALS
OXYGEN SATURATION: 97 % | DIASTOLIC BLOOD PRESSURE: 62 MMHG | HEART RATE: 96 BPM | SYSTOLIC BLOOD PRESSURE: 127 MMHG | RESPIRATION RATE: 16 BRPM | TEMPERATURE: 98 F

## 2023-08-03 DIAGNOSIS — D50.0 IRON DEFICIENCY ANEMIA DUE TO CHRONIC BLOOD LOSS: Primary | ICD-10-CM

## 2023-08-03 PROCEDURE — 96366 THER/PROPH/DIAG IV INF ADDON: CPT

## 2023-08-03 PROCEDURE — 25000003 PHARM REV CODE 250: Performed by: INTERNAL MEDICINE

## 2023-08-03 PROCEDURE — 63600175 PHARM REV CODE 636 W HCPCS: Performed by: INTERNAL MEDICINE

## 2023-08-03 PROCEDURE — 96365 THER/PROPH/DIAG IV INF INIT: CPT

## 2023-08-03 RX ORDER — DIPHENHYDRAMINE HYDROCHLORIDE 50 MG/ML
50 INJECTION INTRAMUSCULAR; INTRAVENOUS ONCE AS NEEDED
Status: CANCELLED | OUTPATIENT
Start: 2023-08-10

## 2023-08-03 RX ORDER — HEPARIN 100 UNIT/ML
500 SYRINGE INTRAVENOUS
Status: CANCELLED | OUTPATIENT
Start: 2023-08-10

## 2023-08-03 RX ORDER — SODIUM CHLORIDE 0.9 % (FLUSH) 0.9 %
10 SYRINGE (ML) INJECTION
Status: CANCELLED | OUTPATIENT
Start: 2023-08-10

## 2023-08-03 RX ORDER — SODIUM CHLORIDE 0.9 % (FLUSH) 0.9 %
10 SYRINGE (ML) INJECTION
Status: DISCONTINUED | OUTPATIENT
Start: 2023-08-03 | End: 2023-08-03 | Stop reason: HOSPADM

## 2023-08-03 RX ORDER — EPINEPHRINE 0.3 MG/.3ML
0.3 INJECTION SUBCUTANEOUS ONCE AS NEEDED
Status: CANCELLED | OUTPATIENT
Start: 2023-08-10

## 2023-08-03 RX ORDER — HEPARIN 100 UNIT/ML
500 SYRINGE INTRAVENOUS
Status: DISCONTINUED | OUTPATIENT
Start: 2023-08-03 | End: 2023-08-03 | Stop reason: HOSPADM

## 2023-08-03 RX ADMIN — IRON SUCROSE 300 MG: 20 INJECTION, SOLUTION INTRAVENOUS at 01:08

## 2023-08-03 RX ADMIN — SODIUM CHLORIDE: 9 INJECTION, SOLUTION INTRAVENOUS at 01:08

## 2023-08-03 NOTE — PLAN OF CARE
Patient ambulatory to clinic. Venofer infused and tolerated well. 30 minute post observation completed with no s/sx of adverse reaction noted. Patient ambulatory from clinic. NAD noted.

## 2023-08-07 ENCOUNTER — OFFICE VISIT (OUTPATIENT)
Dept: PRIMARY CARE CLINIC | Facility: CLINIC | Age: 38
End: 2023-08-07
Payer: COMMERCIAL

## 2023-08-07 DIAGNOSIS — F32.0 CURRENT MILD EPISODE OF MAJOR DEPRESSIVE DISORDER WITHOUT PRIOR EPISODE: Primary | ICD-10-CM

## 2023-08-07 DIAGNOSIS — F41.9 ANXIETY: ICD-10-CM

## 2023-08-07 PROCEDURE — 99214 PR OFFICE/OUTPT VISIT, EST, LEVL IV, 30-39 MIN: ICD-10-PCS | Mod: 95,,, | Performed by: STUDENT IN AN ORGANIZED HEALTH CARE EDUCATION/TRAINING PROGRAM

## 2023-08-07 PROCEDURE — 1159F PR MEDICATION LIST DOCUMENTED IN MEDICAL RECORD: ICD-10-PCS | Mod: CPTII,95,, | Performed by: STUDENT IN AN ORGANIZED HEALTH CARE EDUCATION/TRAINING PROGRAM

## 2023-08-07 PROCEDURE — 1159F MED LIST DOCD IN RCRD: CPT | Mod: CPTII,95,, | Performed by: STUDENT IN AN ORGANIZED HEALTH CARE EDUCATION/TRAINING PROGRAM

## 2023-08-07 PROCEDURE — 1160F RVW MEDS BY RX/DR IN RCRD: CPT | Mod: CPTII,95,, | Performed by: STUDENT IN AN ORGANIZED HEALTH CARE EDUCATION/TRAINING PROGRAM

## 2023-08-07 PROCEDURE — 99214 OFFICE O/P EST MOD 30 MIN: CPT | Mod: 95,,, | Performed by: STUDENT IN AN ORGANIZED HEALTH CARE EDUCATION/TRAINING PROGRAM

## 2023-08-07 PROCEDURE — 1160F PR REVIEW ALL MEDS BY PRESCRIBER/CLIN PHARMACIST DOCUMENTED: ICD-10-PCS | Mod: CPTII,95,, | Performed by: STUDENT IN AN ORGANIZED HEALTH CARE EDUCATION/TRAINING PROGRAM

## 2023-08-07 RX ORDER — BUSPIRONE HYDROCHLORIDE 10 MG/1
TABLET ORAL
Qty: 60 TABLET | Refills: 2 | Status: SHIPPED | OUTPATIENT
Start: 2023-08-07

## 2023-08-07 RX ORDER — BUPROPION HYDROCHLORIDE 150 MG/1
TABLET ORAL
Qty: 60 TABLET | Refills: 5 | Status: SHIPPED | OUTPATIENT
Start: 2023-08-07 | End: 2023-08-28

## 2023-08-07 NOTE — PROGRESS NOTES
The patient location is: [place]  The chief complaint leading to consultation is: Follow-up/anxiety    Visit type: audiovisual    Face to Face time with patient: 20 minutes  27 minutes of total time spent on the encounter, which includes face to face time and non-face to face time preparing to see the patient (eg, review of tests), Obtaining and/or reviewing separately obtained history, Documenting clinical information in the electronic or other health record, Independently interpreting results (not separately reported) and communicating results to the patient/family/caregiver, or Care coordination (not separately reported).     Each patient to whom he or she provides medical services by telemedicine is:  (1) informed of the relationship between the physician and patient and the respective role of any other health care provider with respect to management of the patient; and (2) notified that he or she may decline to receive medical services by telemedicine and may withdraw from such care at any time.    Notes:     HPI:  Patient here for follow-up visit with recent diagnosis of adenocarcinoma of the colon. Currently following with [facility] with plans for resection    Acute concerns?: Patient was curious about getting on medications for anxiety. Feels that Bupropion could be helpful with anxiety symptoms.    Has lots of follow-up scheduled that would also invoke anxiety.    Would like to try Bupropion  No seizure or eating disorder history    Social support locally?: Yes, parent's came down recently with diagnosis and stayed some time. Good support group of friends  Feels would benefit from therapy/someone to talk to outside of friends and family?: Has been recommended by friends, feels personally no. Would if needing chemotherapy  Anxiety?: Has been up  Work tasks?: Patient started a new job 3 weeks prior to diagnosis, fully remote so helps. Works for [employer].    Review of Systems    Psychiatric/Behavioral:  Positive for agitation, decreased concentration, dysphoric mood and sleep disturbance. Negative for behavioral problems and suicidal ideas. The patient is nervous/anxious.         Physical Exam  Constitutional:       General: She is not in acute distress.     Appearance: Normal appearance. She is not ill-appearing or diaphoretic.   HENT:      Head: Normocephalic and atraumatic.   Eyes:      General:         Right eye: No discharge.         Left eye: No discharge.      Conjunctiva/sclera: Conjunctivae normal.   Cardiovascular:      Rate and Rhythm: Normal rate.   Pulmonary:      Effort: Pulmonary effort is normal.   Musculoskeletal:         General: No deformity.      Cervical back: Neck supple. No rigidity.   Skin:     Coloration: Skin is not jaundiced or pale.   Neurological:      General: No focal deficit present.      Mental Status: She is alert and oriented to person, place, and time.   Psychiatric:         Mood and Affect: Mood normal.         Behavior: Behavior normal.         Thought Content: Thought content normal.         Judgment: Judgment normal.         Plan:  Current mild episode of major depressive disorder without prior episode  Anxiety  -     busPIRone (BUSPAR) 10 MG tablet; Take 1/2 tablet (5 mg) three times daily for first week, then 1 tablet twice daily thereafter  Dispense: 60 tablet; Refill: 2  -     buPROPion (WELLBUTRIN XL) 150 MG TB24 tablet; Take one tablet (150 mg) daily first 3 days when starting medication, then two tablets (300 mg) daily thereafter  Dispense: 60 tablet; Refill: 5  - Therapist was offered, patient will reach out if desires referral  - If side effects develop, recommend stop bupropion, if they persist, stop all medications and contact clinic    RTC in 4 weeks

## 2023-08-09 ENCOUNTER — PATIENT MESSAGE (OUTPATIENT)
Dept: HEMATOLOGY/ONCOLOGY | Facility: CLINIC | Age: 38
End: 2023-08-09
Payer: COMMERCIAL

## 2023-08-09 ENCOUNTER — INFUSION (OUTPATIENT)
Dept: INFUSION THERAPY | Facility: HOSPITAL | Age: 38
End: 2023-08-09
Payer: COMMERCIAL

## 2023-08-09 VITALS
RESPIRATION RATE: 18 BRPM | BODY MASS INDEX: 22.5 KG/M2 | SYSTOLIC BLOOD PRESSURE: 118 MMHG | WEIGHT: 135.06 LBS | HEIGHT: 65 IN | DIASTOLIC BLOOD PRESSURE: 58 MMHG | TEMPERATURE: 98 F | HEART RATE: 97 BPM

## 2023-08-09 DIAGNOSIS — D50.0 IRON DEFICIENCY ANEMIA DUE TO CHRONIC BLOOD LOSS: Primary | ICD-10-CM

## 2023-08-09 PROCEDURE — 63600175 PHARM REV CODE 636 W HCPCS: Performed by: INTERNAL MEDICINE

## 2023-08-09 PROCEDURE — 96366 THER/PROPH/DIAG IV INF ADDON: CPT

## 2023-08-09 PROCEDURE — 25000003 PHARM REV CODE 250: Performed by: INTERNAL MEDICINE

## 2023-08-09 PROCEDURE — A4216 STERILE WATER/SALINE, 10 ML: HCPCS | Performed by: INTERNAL MEDICINE

## 2023-08-09 PROCEDURE — 96365 THER/PROPH/DIAG IV INF INIT: CPT

## 2023-08-09 RX ORDER — EPINEPHRINE 0.3 MG/.3ML
0.3 INJECTION SUBCUTANEOUS ONCE AS NEEDED
Status: CANCELLED | OUTPATIENT
Start: 2023-08-16

## 2023-08-09 RX ORDER — DIPHENHYDRAMINE HYDROCHLORIDE 50 MG/ML
50 INJECTION INTRAMUSCULAR; INTRAVENOUS ONCE AS NEEDED
Status: CANCELLED | OUTPATIENT
Start: 2023-08-16

## 2023-08-09 RX ORDER — HEPARIN 100 UNIT/ML
500 SYRINGE INTRAVENOUS
Status: DISCONTINUED | OUTPATIENT
Start: 2023-08-09 | End: 2023-08-09 | Stop reason: HOSPADM

## 2023-08-09 RX ORDER — HEPARIN 100 UNIT/ML
500 SYRINGE INTRAVENOUS
Status: CANCELLED | OUTPATIENT
Start: 2023-08-16

## 2023-08-09 RX ORDER — SODIUM CHLORIDE 0.9 % (FLUSH) 0.9 %
10 SYRINGE (ML) INJECTION
Status: DISCONTINUED | OUTPATIENT
Start: 2023-08-09 | End: 2023-08-09 | Stop reason: HOSPADM

## 2023-08-09 RX ORDER — SODIUM CHLORIDE 0.9 % (FLUSH) 0.9 %
10 SYRINGE (ML) INJECTION
Status: CANCELLED | OUTPATIENT
Start: 2023-08-16

## 2023-08-09 RX ADMIN — IRON SUCROSE 300 MG: 20 INJECTION, SOLUTION INTRAVENOUS at 02:08

## 2023-08-09 RX ADMIN — SODIUM CHLORIDE: 9 INJECTION, SOLUTION INTRAVENOUS at 02:08

## 2023-08-09 RX ADMIN — Medication 10 ML: at 04:08

## 2023-08-09 NOTE — PLAN OF CARE
1424-Labs, hx, and medications reviewed, pt meets parameters for treatment today. Assessment completed and plan of care reviewed. Pt verbalized understanding. PIV accessed with no complications. Pt voices no new complaints or concerns, will continue to monitor for safety.

## 2023-08-09 NOTE — PLAN OF CARE
1640-Pt tolerated Venofer #2 well today, no complaints or complications. VSS. Pt aware to call provider with any questions or concerns and is aware of upcoming appts. Pt ambulatory from clinic with steady gait, no distress noted.

## 2023-08-10 LAB
ONEOME COMMENT: NORMAL
ONEOME METHOD: NORMAL

## 2023-08-15 ENCOUNTER — PATIENT MESSAGE (OUTPATIENT)
Dept: HEMATOLOGY/ONCOLOGY | Facility: CLINIC | Age: 38
End: 2023-08-15
Payer: COMMERCIAL

## 2023-08-16 ENCOUNTER — PATIENT MESSAGE (OUTPATIENT)
Dept: HEMATOLOGY/ONCOLOGY | Facility: CLINIC | Age: 38
End: 2023-08-16
Payer: COMMERCIAL

## 2023-08-16 ENCOUNTER — PATIENT MESSAGE (OUTPATIENT)
Dept: SURGERY | Facility: CLINIC | Age: 38
End: 2023-08-16
Payer: COMMERCIAL

## 2023-08-17 ENCOUNTER — INFUSION (OUTPATIENT)
Dept: INFUSION THERAPY | Facility: HOSPITAL | Age: 38
End: 2023-08-17
Payer: COMMERCIAL

## 2023-08-17 VITALS
HEART RATE: 77 BPM | SYSTOLIC BLOOD PRESSURE: 130 MMHG | RESPIRATION RATE: 16 BRPM | TEMPERATURE: 98 F | DIASTOLIC BLOOD PRESSURE: 59 MMHG

## 2023-08-17 DIAGNOSIS — D50.0 IRON DEFICIENCY ANEMIA DUE TO CHRONIC BLOOD LOSS: Primary | ICD-10-CM

## 2023-08-17 PROCEDURE — 25000003 PHARM REV CODE 250: Performed by: INTERNAL MEDICINE

## 2023-08-17 PROCEDURE — 96366 THER/PROPH/DIAG IV INF ADDON: CPT

## 2023-08-17 PROCEDURE — 96365 THER/PROPH/DIAG IV INF INIT: CPT

## 2023-08-17 PROCEDURE — 63600175 PHARM REV CODE 636 W HCPCS: Performed by: INTERNAL MEDICINE

## 2023-08-17 RX ORDER — HEPARIN 100 UNIT/ML
500 SYRINGE INTRAVENOUS
Status: DISCONTINUED | OUTPATIENT
Start: 2023-08-17 | End: 2023-08-17 | Stop reason: HOSPADM

## 2023-08-17 RX ORDER — SODIUM CHLORIDE 0.9 % (FLUSH) 0.9 %
10 SYRINGE (ML) INJECTION
Status: DISCONTINUED | OUTPATIENT
Start: 2023-08-17 | End: 2023-08-17 | Stop reason: HOSPADM

## 2023-08-17 RX ORDER — SODIUM CHLORIDE 0.9 % (FLUSH) 0.9 %
10 SYRINGE (ML) INJECTION
OUTPATIENT
Start: 2023-08-24

## 2023-08-17 RX ORDER — HEPARIN 100 UNIT/ML
500 SYRINGE INTRAVENOUS
OUTPATIENT
Start: 2023-08-24

## 2023-08-17 RX ORDER — DIPHENHYDRAMINE HYDROCHLORIDE 50 MG/ML
50 INJECTION INTRAMUSCULAR; INTRAVENOUS ONCE AS NEEDED
OUTPATIENT
Start: 2023-08-24

## 2023-08-17 RX ORDER — EPINEPHRINE 0.3 MG/.3ML
0.3 INJECTION SUBCUTANEOUS ONCE AS NEEDED
OUTPATIENT
Start: 2023-08-24

## 2023-08-17 RX ADMIN — SODIUM CHLORIDE: 9 INJECTION, SOLUTION INTRAVENOUS at 10:08

## 2023-08-17 RX ADMIN — IRON SUCROSE 300 MG: 20 INJECTION, SOLUTION INTRAVENOUS at 10:08

## 2023-08-21 ENCOUNTER — TELEPHONE (OUTPATIENT)
Dept: ENDOSCOPY | Facility: HOSPITAL | Age: 38
End: 2023-08-21
Payer: COMMERCIAL

## 2023-08-21 ENCOUNTER — PATIENT MESSAGE (OUTPATIENT)
Dept: HEMATOLOGY/ONCOLOGY | Facility: CLINIC | Age: 38
End: 2023-08-21
Payer: COMMERCIAL

## 2023-08-21 VITALS — WEIGHT: 135 LBS | BODY MASS INDEX: 22.49 KG/M2 | HEIGHT: 65 IN

## 2023-08-21 DIAGNOSIS — Z85.038 HISTORY OF COLON CANCER: Primary | ICD-10-CM

## 2023-08-21 DIAGNOSIS — Z12.11 ENCOUNTER FOR SCREENING COLONOSCOPY: Primary | ICD-10-CM

## 2023-08-21 NOTE — TELEPHONE ENCOUNTER
"----- Message from Juana Fernandez sent at 8/16/2023  3:32 PM CDT -----    ----- Message -----  From: Dennise Scott RN  Sent: 8/16/2023  12:38 PM CDT  To: [facility] Patients    Procedure: Colonoscopy    Diagnosis: History of colon cancer    Procedure Timing: October    #If within 4 weeks selected, please lashae as high priority#    #If greater than 12 weeks, please select "5-12 weeks" and delay sending until 2 months prior to requested date#     Provider: Helga    Location: [address]    Additional Scheduling Information: No scheduling concerns    Prep Specifications:Standard prep    Is the patient taking a GLP-1 Agonist:No    Have you attached a patient to this message: yes       "

## 2023-08-21 NOTE — TELEPHONE ENCOUNTER
Spoke to patient to schedule procedure(s) Colonoscopy       Physician to perform procedure(s) Dr. BETHANY Partida  Date of Procedure (s) 11/162023  Arrival Time 9:15 am   Time of Procedure(s) 10:15 am    Location of Procedure(s) [place] 4th Floor  Type of Rx Prep sent to patient: Sutab  Instructions provided to patient via [facility]    Patient was informed on the following information and verbalized understanding. Screening questionnaire reviewed with patient and complete. If procedure requires anesthesia, a responsible adult needs to be present to accompany the patient home, patient cannot drive after receiving anesthesia. Appointment details are tentative, especially check-in time. Patient will receive a prep-op call 4 days prior to confirm check-in time for procedure. If applicable the patient should contact their pharmacy to verify Rx for procedure prep is ready for pick-up. Patient was advised to call the scheduling department at [phone] if pharmacy states no Rx is available. Patient was advised to call the endoscopy scheduling department if any questions or concerns arise.      [facility] Scheduling Department

## 2023-08-22 RX ORDER — SOD SULF/POT CHLORIDE/MAG SULF 1.479 G
12 TABLET ORAL DAILY
Qty: 24 TABLET | Refills: 0 | Status: SHIPPED | OUTPATIENT
Start: 2023-08-22

## 2023-08-24 LAB
HLA B27 INTERPRETATION: NORMAL
HLA-B27 RELATED AG QL: NORMAL
HLA-B27 RELATED AG QL: POSITIVE

## 2023-08-25 ENCOUNTER — TELEPHONE (OUTPATIENT)
Dept: HEMATOLOGY/ONCOLOGY | Facility: CLINIC | Age: 38
End: 2023-08-25
Payer: COMMERCIAL

## 2023-08-25 LAB
GENETIC COUNSELING?: YES
GENSO SPECIMEN TYPE: NORMAL
MISCELLANEOUS GENETIC TEST NAME: NORMAL
PARTENTAL OR SIBLING TESTING?: NO
REFERENCE LAB: NORMAL
TEST RESULT: NORMAL

## 2023-08-25 NOTE — TELEPHONE ENCOUNTER
Impression    Trice's panel genetic testing was negative for actionable mutations in 77 genes associated with hereditary breast, gastrointestinal, gynecologic, kidney, neuroendocrine, pancreatic, prostate, skin and other cancers. Results were disclosed over the phone on 8/25/23.    Discussion    Genetic Test Results    Trice had a sample submitted on 8/2/23 to [facility] for CancerNext-Expanded +RNAinsight panel testing. This panel includes sequencing and/or deletion/duplication analysis of the following 77 genes:    AIP, ALK, APC, DIPESH, AXIN2, BAP1, BARD1, BLM, BMPR1A, BRCA1, BRCA2, BRIP1, CDC73, CDH1, CDK4, CDKN1B, CDKN2A, CHEK2, CTNNA1, DICER1, EGFR, EGLN1, EPCAM, FANCC, FH, FLCN, GALNT12, GREM1, HOXB13, KIF1B, KIT, LZTR1, MAX, MEN1, MET, MITF, MLH1, MSH2, MSH3, MSH6, MUTYH, NBN, NF1, NF2, NTHL1, PALB2, PDGFRA, PHOX2B, PMS2, POLD1, POLE, POT1, OBGRG1V, PTCH1, PTEN, RAD51C, RAD51D, RB1, RECQL, RET, SDHA, SDHAF2, SDHB, SDHC, SDHD, SMAD4, SMARCA4, SMARCB1, SMARCE1, STK11, SUFU, NBQL534, TP53, TSC1, TSC2, VHL, XRCC2     The results were negative for actionable mutations in any of these genes. This is considered a negative result, but it does not completely rule out a hereditary form of cancer in her family. Some individuals/families with cancer may have a mutation in a gene that is not included in this panel, and some may have mutations in one of these genes that is not detectable with our current technology. It could also be that Trice's personal and family history of cancer is not due to a hereditary cause.     Given Trice's negative results and her mother's negative results, the likelihood of a hereditary form of cancer has been drastically reduced for Trice and her maternal side of the family. She has undergone comprehensive hereditary cancer genetic testing, and no further genetic testing is recommended at this time.    Cancer Risks    Breast Cancer Risk Stratification   Current, Estimated Breast Cancer Risk  Model Used Patient's Score Patient's Risk Category   5-year Ava Model 0.7% (vs 0.3%)  [] N/A given age <35   [x] Average risk (<1.7%)   [] Increased risk (?1.7%)   10-year Tyrer-Cuzick v8.0b 2.9% (vs 1.1%)  [x] <5%   [] ?5%    Lifetime (to age 85) Tyrer-Cuzick v8.0b 27.4% (vs 11.0%)  [] Average risk (<15%)   [] Intermediate risk (?15% - <20%)   [x] Increased risk (?20%)      There are differences between these models and how her personal and family history affects these risks. Due to her >20% lifetime risk of breast cancer, breast MRI is indicated for her in addition to annual mammogram starting at 41yo. Her risk should also be recalculated at that time once her breast density can be included, as it will likely impact her score. She can be referred to the [facility] at that time, if her risk is still >20%.    Family Members    Trice's close relatives may still be at increased risk of colorectal cancer given her own diagnosis. Specifically, her sisters should undergo colonoscopy every 5 years, per NCCN guidelines. They may also be at increased risk of breast cancer given the maternal family history. A high-risk breast specialist could determine whether addition breast MRI to their screening regimen is warranted. Additionally, her father should consider pursuing his own genetic testing given his personal and family history of prostate cancer. However, it is not recommended for unaffected individuals to undergo genetic testing at this time.    Trice received comprehensive genetic counseling regarding her negative genetic test results. Benefits and limitations were discussed, and she was provided with an electronic copy of her results report. Trice is encouraged to contact cancer genetics if there are any updates to her personal or family history, or if she has any questions or concerns.    This assessment is based on the history and reports provided, as well as the current scientific knowledge regarding  cancer genetics.

## 2023-08-27 ENCOUNTER — PATIENT MESSAGE (OUTPATIENT)
Dept: PRIMARY CARE CLINIC | Facility: CLINIC | Age: 38
End: 2023-08-27
Payer: COMMERCIAL

## 2023-08-28 ENCOUNTER — TELEPHONE (OUTPATIENT)
Dept: ENDOSCOPY | Facility: HOSPITAL | Age: 38
End: 2023-08-28
Payer: COMMERCIAL

## 2023-08-28 DIAGNOSIS — F32.0 CURRENT MILD EPISODE OF MAJOR DEPRESSIVE DISORDER WITHOUT PRIOR EPISODE: ICD-10-CM

## 2023-08-28 RX ORDER — BUPROPION HYDROCHLORIDE 150 MG/1
TABLET ORAL
Qty: 60 TABLET | Refills: 5 | Status: SHIPPED | OUTPATIENT
Start: 2023-08-28

## 2023-08-28 NOTE — TELEPHONE ENCOUNTER
Spoke to pt to schedule procedure(s) Colonoscopy       Physician to perform procedure(s) Dr. BELEM Vega  Date of Procedure (s) 10/13/23  Arrival Time 10:00 AM  Time of Procedure(s) 11:00 AM   Location of Procedure(s) [place] [address]  Type of Rx Prep sent to patient: Sutab  Instructions provided to patient via [facility]    Patient was informed on the following information and verbalized understanding. Screening questionnaire reviewed with patient and complete. If procedure requires anesthesia, a responsible adult needs to be present to accompany the patient home, patient cannot drive after receiving anesthesia. Appointment details are tentative, especially check-in time. Patient will receive a prep-op call 4 days prior to confirm check-in time for procedure. If applicable the patient should contact their pharmacy to verify Rx for procedure prep is ready for pick-up. Patient was advised to call the scheduling department at [phone] if pharmacy states no Rx is available. Patient was advised to call the endoscopy scheduling department if any questions or concerns arise.      [facility] Scheduling Department

## 2023-08-28 NOTE — TELEPHONE ENCOUNTER
No care due was identified.  [facility] Embedded Care Due Messages. Reference number: [id number].   8/28/2023 10:18:13 AM CDT

## 2023-08-28 NOTE — TELEPHONE ENCOUNTER
Refill Routing Note   Medication(s) are not appropriate for processing by [facility] for the following reason(s):      New or recently adjusted medication    [facility] action(s):  Defer Care Due:  None identified            Appointments  past 12m or future 3m with PCP    Date Provider   Last Visit   8/7/2023 Glenn Vega MD   Next Visit   Visit date not found Glenn Vega MD   ED visits in past 90 days: 0        Note composed:11:05 AM 08/28/2023

## 2023-08-29 ENCOUNTER — PATIENT MESSAGE (OUTPATIENT)
Dept: HEMATOLOGY/ONCOLOGY | Facility: CLINIC | Age: 38
End: 2023-08-29
Payer: COMMERCIAL

## 2023-08-30 ENCOUNTER — TELEPHONE (OUTPATIENT)
Dept: ENDOSCOPY | Facility: HOSPITAL | Age: 38
End: 2023-08-30
Payer: COMMERCIAL

## 2023-08-30 NOTE — TELEPHONE ENCOUNTER
"----- Message from Mindy B Seipel, RN sent at 2023  8:33 AM CDT -----      ----- Message -----  From: Juana Fernandez  Sent: 2023   8:17 AM CDT  To: Mindy B Seipel, RN      ----- Message -----  From: Luz Maria Danielle RN  Sent: 2023  12:00 AM CDT  To: Alda Partida MD; #    Procedure: Colonoscopy    Diagnosis: Surveillance colonoscopy - Hx of colon cancer    Procedure Timin-12 weeks - 3 months from August 3    #If within 4 weeks selected, please lashae as high priority#    #If greater than 12 weeks, please select "5-12 weeks" and delay sending until 2 months prior to requested date#     Provider: Helga      Location: No Preference    Additional Scheduling Information: No scheduling concerns    Prep Specifications:Standard prep    Have you attached a patient to this message: yes       "

## 2023-08-30 NOTE — TELEPHONE ENCOUNTER
Spoke to pt to reschedule procedure(s) Colonoscopy       Physician to perform procedure(s) Dr. BETHANY Partida  Date of Procedure (s) 10/12/23  Arrival Time 6:00 AM  Time of Procedure(s) 7:00 AM   Location of Procedure(s) [place] [address] Floor  Type of Rx Prep sent to patient: Sutab  Instructions provided to patient via [facility]    Patient was informed on the following information and verbalized understanding. Screening questionnaire reviewed with patient and complete. If procedure requires anesthesia, a responsible adult needs to be present to accompany the patient home, patient cannot drive after receiving anesthesia. Appointment details are tentative, especially check-in time. Patient will receive a prep-op call 4 days prior to confirm check-in time for procedure. If applicable the patient should contact their pharmacy to verify Rx for procedure prep is ready for pick-up. Patient was advised to call the scheduling department at [phone] if pharmacy states no Rx is available. Patient was advised to call the endoscopy scheduling department if any questions or concerns arise.      [facility] Scheduling Department

## 2023-09-18 ENCOUNTER — PATIENT MESSAGE (OUTPATIENT)
Dept: PRIMARY CARE CLINIC | Facility: CLINIC | Age: 38
End: 2023-09-18
Payer: COMMERCIAL

## 2023-10-04 ENCOUNTER — TELEPHONE (OUTPATIENT)
Dept: ENDOSCOPY | Facility: HOSPITAL | Age: 38
End: 2023-10-04
Payer: COMMERCIAL

## 2023-10-04 NOTE — TELEPHONE ENCOUNTER
----- Message from Juana Fernandez sent at 10/4/2023 11:09 AM CDT -----    ----- Message -----  From: Dennise Scott RN  Sent: 10/4/2023   9:35 AM CDT  To: [facility] Endo Schedulers      ----- Message -----  From: Nolan Ozuna  Sent: 10/3/2023   5:56 PM CDT  To: [facility] Endo Schedulers; Helga Lizama Staff    Type:  Patient Returning Call    Who Called:pt  Who Left Message for Patient:  Does the patient know what this is regarding?:procedure info  Would the patient rather a call back or a response via [facility]? Call  Best Call Back Number:[phone]  Additional Information: pt states she would like a call to discuss upcoming procedure.

## 2023-10-04 NOTE — TELEPHONE ENCOUNTER
Contacted the patient in regards to below message. Spoke with patient. All questions answered. Patient verbalized understanding.

## 2023-10-09 ENCOUNTER — OFFICE VISIT (OUTPATIENT)
Dept: RHEUMATOLOGY | Facility: CLINIC | Age: 38
End: 2023-10-09
Payer: COMMERCIAL

## 2023-10-09 VITALS
WEIGHT: 155.19 LBS | SYSTOLIC BLOOD PRESSURE: 112 MMHG | BODY MASS INDEX: 25.85 KG/M2 | HEIGHT: 65 IN | HEART RATE: 60 BPM | DIASTOLIC BLOOD PRESSURE: 67 MMHG

## 2023-10-09 DIAGNOSIS — Z15.89 HLA B27 (HLA B27 POSITIVE): ICD-10-CM

## 2023-10-09 PROBLEM — C18.7 MALIGNANT NEOPLASM OF SIGMOID COLON: Status: ACTIVE | Noted: 2023-07-06

## 2023-10-09 PROBLEM — Z72.0 TOBACCO ABUSE: Status: RESOLVED | Noted: 2018-05-08 | Resolved: 2023-10-09

## 2023-10-09 PROBLEM — G54.0 THORACIC OUTLET SYNDROME: Status: ACTIVE | Noted: 2023-07-15

## 2023-10-09 PROCEDURE — 1159F PR MEDICATION LIST DOCUMENTED IN MEDICAL RECORD: ICD-10-PCS | Mod: CPTII,S$GLB,, | Performed by: INTERNAL MEDICINE

## 2023-10-09 PROCEDURE — 99204 PR OFFICE/OUTPT VISIT, NEW, LEVL IV, 45-59 MIN: ICD-10-PCS | Mod: S$GLB,,, | Performed by: INTERNAL MEDICINE

## 2023-10-09 PROCEDURE — 99999 PR PBB SHADOW E&M-EST. PATIENT-LVL III: ICD-10-PCS | Mod: PBBFAC,,, | Performed by: INTERNAL MEDICINE

## 2023-10-09 PROCEDURE — 3008F PR BODY MASS INDEX (BMI) DOCUMENTED: ICD-10-PCS | Mod: CPTII,S$GLB,, | Performed by: INTERNAL MEDICINE

## 2023-10-09 PROCEDURE — 3078F DIAST BP <80 MM HG: CPT | Mod: CPTII,S$GLB,, | Performed by: INTERNAL MEDICINE

## 2023-10-09 PROCEDURE — 1159F MED LIST DOCD IN RCRD: CPT | Mod: CPTII,S$GLB,, | Performed by: INTERNAL MEDICINE

## 2023-10-09 PROCEDURE — 3074F SYST BP LT 130 MM HG: CPT | Mod: CPTII,S$GLB,, | Performed by: INTERNAL MEDICINE

## 2023-10-09 PROCEDURE — 99204 OFFICE O/P NEW MOD 45 MIN: CPT | Mod: S$GLB,,, | Performed by: INTERNAL MEDICINE

## 2023-10-09 PROCEDURE — 3074F PR MOST RECENT SYSTOLIC BLOOD PRESSURE < 130 MM HG: ICD-10-PCS | Mod: CPTII,S$GLB,, | Performed by: INTERNAL MEDICINE

## 2023-10-09 PROCEDURE — 3078F PR MOST RECENT DIASTOLIC BLOOD PRESSURE < 80 MM HG: ICD-10-PCS | Mod: CPTII,S$GLB,, | Performed by: INTERNAL MEDICINE

## 2023-10-09 PROCEDURE — 3044F HG A1C LEVEL LT 7.0%: CPT | Mod: CPTII,S$GLB,, | Performed by: INTERNAL MEDICINE

## 2023-10-09 PROCEDURE — 99999 PR PBB SHADOW E&M-EST. PATIENT-LVL III: CPT | Mod: PBBFAC,,, | Performed by: INTERNAL MEDICINE

## 2023-10-09 PROCEDURE — 3008F BODY MASS INDEX DOCD: CPT | Mod: CPTII,S$GLB,, | Performed by: INTERNAL MEDICINE

## 2023-10-09 PROCEDURE — 3044F PR MOST RECENT HEMOGLOBIN A1C LEVEL <7.0%: ICD-10-PCS | Mod: CPTII,S$GLB,, | Performed by: INTERNAL MEDICINE

## 2023-10-09 ASSESSMENT — ROUTINE ASSESSMENT OF PATIENT INDEX DATA (RAPID3)
PAIN SCORE: 2.5
AM STIFFNESS SCORE: 1, YES
FATIGUE SCORE: 1.5
PSYCHOLOGICAL DISTRESS SCORE: 3.3
MDHAQ FUNCTION SCORE: 0.5
TOTAL RAPID3 SCORE: 2.39
WHEN YOU AWAKENED IN THE MORNING OVER THE LAST WEEK, PLEASE INDICATE THE AMOUNT OF TIME IT TAKES UNTIL YOU ARE AS LIMBER AS YOU WILL BE FOR THE DAY: 2
PATIENT GLOBAL ASSESSMENT SCORE: 3

## 2023-10-09 NOTE — PROGRESS NOTES
"Subjective:       Patient ID: Flora Edmond is a 38 y.o. female.    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 for last year; some difficulty getting to sleep; not awakening her at night  Thinks that physical activity helps back stiffness  Standing a long time leads to more pain  2022 played soccer; fell and R SI joint pain since  Oncology ordered HLA B27    FH  2 sisters well; one in [place]  Father Factor 9 deficiency; had prostate cancer  Mother breast cancer    PMH:  R ear deafness since birth  Some symptoms of thoracic outlet syndrome attributed to cervical rib    Review of Systems   Constitutional:  Negative for fever and unexpected weight change.   HENT:  Negative for mouth sores and trouble swallowing.    Eyes:  Negative for redness.   Respiratory:  Negative for cough and shortness of breath.    Cardiovascular:  Negative for chest pain.   Gastrointestinal:  Negative for constipation and diarrhea.   Genitourinary:  Negative for dysuria and genital sores.   Skin:  Negative for rash.   Neurological:  Negative for headaches.   Hematological:  Does not bruise/bleed easily.         Objective:   /67   Pulse 60   Ht 5' 5" (1.651 m)   Wt 70.4 kg (155 lb 3.3 oz)   BMI 25.83 kg/m²      Physical Exam   Pulmonary/Chest: No respiratory distress.   No increased work of breathing   Musculoskeletal:         General: No swelling, tenderness or deformity.      Comments: Negative DEBRA  Schober 10 to 15  Fingers 3.5 inches to " floor  Normal occiput to wall     Neurological:   Alert, awake, with no abnormal movements   Skin: No rash noted.   No psoriasis   Psychiatric:   Normal mood and affect; clear, rational thinking; speech normal and not pressured         Assessment:       1. HLA B27 (HLA B27 positive)            Plan:       Problem List Items Addressed This Visit          Active Problems    HLA B27 (HLA B27 positive)     She had early colon cancer and tested positive for HLA-B27, which has been associated with higher risk of colon cancer.    She has had back pain for years that she blamed on sports, and for last year some Low back/ sacral pain that occurred after soccer; she notes worse with prolong standing and is better with activity and ibuprofen    No history dx'd iritis though had painful red eye once maybe 5 years ago    physical examination notable for normal though Schober 10 to 15 cm and fingers 3.5 inches from floor. No difficulty with occiput to wall    Normal ESR 20 in June    CT and MR imaging of pelvis in June did not show sacroiliitis.     Overall she describes back pain that has some inflammatory features but had negative imaging so ankylosing spondylitis is not likely    I will ask radiology if they can re-examine MRI to explicitly address early sacroiliitis  I discussed the other complications of B27 with her   She may continue ibuprofen prn and is encouraged to resume PT and exercise for back    She will follow up prn and message me via portal if any additional concerns or issues.

## 2023-10-09 NOTE — ASSESSMENT & PLAN NOTE
She had early colon cancer and tested positive for HLA-B27, which has been associated with higher risk of colon cancer.    She has had back pain for years that she blamed on sports, and for last year some Low back/ sacral pain that occurred after soccer; she notes worse with prolong standing and is better with activity and ibuprofen    No history dx'd iritis though had painful red eye once maybe 5 years ago    physical examination notable for normal though Schober 10 to 15 cm and fingers 3.5 inches from floor. No difficulty with occiput to wall    Normal ESR 20 in June    CT and MR imaging of pelvis in June did not show sacroiliitis.     Overall she describes back pain that has some inflammatory features but had negative imaging so ankylosing spondylitis is not likely    I will ask radiology if they can re-examine MRI to explicitly address early sacroiliitis  I discussed the other complications of B27 with her   She may continue ibuprofen prn and is encouraged to resume PT and exercise for back    She will follow up prn and message me via portal if any additional concerns or issues.

## 2023-10-09 NOTE — PROGRESS NOTES
10/5/2023     1:34 AM   Rapid3 Question Responses and Scores   MDHAQ Score 0.5   Psychologic Score 3.3   Pain Score 2.5   When you awakened in the morning OVER THE LAST WEEK, did you feel stiff? Yes   If Yes, please indicate the number of hours until you are as limber as you will be for the day 2   Fatigue Score 1.5   Global Health Score 3   RAPID3 Score 2.39        Answers submitted by the patient for this visit:  Rheumatology Questionnaire (Submitted on 10/5/2023)  fever: No  eye redness: No  mouth sores: No  headaches: No  shortness of breath: No  chest pain: No  trouble swallowing: No  diarrhea: No  constipation: No  unexpected weight change: No  genital sore: No  dysuria: No  During the last 3 days, have you had a skin rash?: No  Bruises or bleeds easily: No  cough: No

## 2023-10-10 ENCOUNTER — PATIENT MESSAGE (OUTPATIENT)
Dept: RHEUMATOLOGY | Facility: CLINIC | Age: 38
End: 2023-10-10
Payer: COMMERCIAL

## 2023-10-12 ENCOUNTER — HOSPITAL ENCOUNTER (OUTPATIENT)
Facility: HOSPITAL | Age: 38
Discharge: HOME | End: 2023-10-12
Attending: COLON & RECTAL SURGERY | Admitting: COLON & RECTAL SURGERY
Payer: COMMERCIAL

## 2023-10-12 ENCOUNTER — ANESTHESIA EVENT (OUTPATIENT)
Dept: ENDOSCOPY | Facility: HOSPITAL | Age: 38
End: 2023-10-12
Payer: COMMERCIAL

## 2023-10-12 ENCOUNTER — ANESTHESIA (OUTPATIENT)
Dept: ENDOSCOPY | Facility: HOSPITAL | Age: 38
End: 2023-10-12
Payer: COMMERCIAL

## 2023-10-12 VITALS
TEMPERATURE: 97 F | RESPIRATION RATE: 16 BRPM | HEART RATE: 68 BPM | BODY MASS INDEX: 23.32 KG/M2 | WEIGHT: 140 LBS | HEIGHT: 65 IN | SYSTOLIC BLOOD PRESSURE: 128 MMHG | OXYGEN SATURATION: 99 % | DIASTOLIC BLOOD PRESSURE: 85 MMHG

## 2023-10-12 DIAGNOSIS — Z85.038 PERSONAL HISTORY OF COLON CANCER: ICD-10-CM

## 2023-10-12 LAB
B-HCG UR QL: NEGATIVE
CTP QC/QA: YES

## 2023-10-12 PROCEDURE — 88305 TISSUE EXAM BY PATHOLOGIST: CPT | Performed by: STUDENT IN AN ORGANIZED HEALTH CARE EDUCATION/TRAINING PROGRAM

## 2023-10-12 PROCEDURE — E9220 PRA ENDO ANESTHESIA: HCPCS | Mod: 33,,, | Performed by: NURSE ANESTHETIST, CERTIFIED REGISTERED

## 2023-10-12 PROCEDURE — 37000008 HC ANESTHESIA 1ST 15 MINUTES: Performed by: COLON & RECTAL SURGERY

## 2023-10-12 PROCEDURE — 45385 COLONOSCOPY W/LESION REMOVAL: CPT | Mod: PT | Performed by: COLON & RECTAL SURGERY

## 2023-10-12 PROCEDURE — 37000009 HC ANESTHESIA EA ADD 15 MINS: Performed by: COLON & RECTAL SURGERY

## 2023-10-12 PROCEDURE — 88305 TISSUE EXAM BY PATHOLOGIST: ICD-10-PCS | Mod: 26,,, | Performed by: STUDENT IN AN ORGANIZED HEALTH CARE EDUCATION/TRAINING PROGRAM

## 2023-10-12 PROCEDURE — 45385 COLONOSCOPY W/LESION REMOVAL: CPT | Mod: 33,,, | Performed by: COLON & RECTAL SURGERY

## 2023-10-12 PROCEDURE — 63600175 PHARM REV CODE 636 W HCPCS: Performed by: NURSE ANESTHETIST, CERTIFIED REGISTERED

## 2023-10-12 PROCEDURE — 25000003 PHARM REV CODE 250: Performed by: NURSE ANESTHETIST, CERTIFIED REGISTERED

## 2023-10-12 PROCEDURE — E9220 PRA ENDO ANESTHESIA: ICD-10-PCS | Mod: 33,,, | Performed by: NURSE ANESTHETIST, CERTIFIED REGISTERED

## 2023-10-12 PROCEDURE — 88305 TISSUE EXAM BY PATHOLOGIST: CPT | Mod: 26,,, | Performed by: STUDENT IN AN ORGANIZED HEALTH CARE EDUCATION/TRAINING PROGRAM

## 2023-10-12 PROCEDURE — 45385 PR COLONOSCOPY,REMV LESN,SNARE: ICD-10-PCS | Mod: 33,,, | Performed by: COLON & RECTAL SURGERY

## 2023-10-12 PROCEDURE — 27201089 HC SNARE, DISP (ANY): Performed by: COLON & RECTAL SURGERY

## 2023-10-12 PROCEDURE — 81025 URINE PREGNANCY TEST: CPT | Performed by: COLON & RECTAL SURGERY

## 2023-10-12 RX ORDER — PROPOFOL 10 MG/ML
VIAL (ML) INTRAVENOUS CONTINUOUS PRN
Status: DISCONTINUED | OUTPATIENT
Start: 2023-10-12 | End: 2023-10-12

## 2023-10-12 RX ORDER — LIDOCAINE HYDROCHLORIDE 20 MG/ML
INJECTION, SOLUTION EPIDURAL; INFILTRATION; INTRACAUDAL; PERINEURAL
Status: DISCONTINUED | OUTPATIENT
Start: 2023-10-12 | End: 2023-10-12

## 2023-10-12 RX ORDER — PROPOFOL 10 MG/ML
VIAL (ML) INTRAVENOUS
Status: DISCONTINUED | OUTPATIENT
Start: 2023-10-12 | End: 2023-10-12

## 2023-10-12 RX ORDER — SODIUM CHLORIDE 9 MG/ML
INJECTION, SOLUTION INTRAVENOUS CONTINUOUS
Status: DISCONTINUED | OUTPATIENT
Start: 2023-10-12 | End: 2023-10-12 | Stop reason: HOSPADM

## 2023-10-12 RX ADMIN — LIDOCAINE HYDROCHLORIDE 50 MG: 20 INJECTION, SOLUTION EPIDURAL; INFILTRATION; INTRACAUDAL; PERINEURAL at 07:10

## 2023-10-12 RX ADMIN — PROPOFOL 100 MG: 10 INJECTION, EMULSION INTRAVENOUS at 07:10

## 2023-10-12 RX ADMIN — SODIUM CHLORIDE: 0.9 INJECTION, SOLUTION INTRAVENOUS at 07:10

## 2023-10-12 RX ADMIN — PROPOFOL 150 MCG/KG/MIN: 10 INJECTION, EMULSION INTRAVENOUS at 07:10

## 2023-10-12 NOTE — H&P
COLONOSCOPY HISTORY AND PHYSICAL EXAM    Procedure : Colonoscopy      INDICATIONS: rectal bleeding and personal history of colon cancer    Family Hx of CRC: denies    Last Colonoscopy: 6/30/23  Findings: sigmoid colon cancer, unable to traverse  Sedation Problems: NO  Fam Hx of Sedation Problems: NO     Past Medical History:   Diagnosis Date    Allergy     seasonal    Cervical rib     Vitamin D deficiency      Family History   Problem Relation Age of Onset    Breast cancer Maternal Grandmother 61    Hemophilia Father         Factor IX    Prostate cancer Father 72        [place] 6 or 7    Hyperlipidemia Father     Coronary artery disease Father     Heart disease Father     Thyroid disease Mother     Breast cancer Mother 72        triple negative, CHEK2 VUS    Asthma Mother     No Known Problems Sister     Prostate cancer Paternal Uncle 55    Prostate cancer Paternal Grandfather 70    COPD Paternal Grandfather     Heart disease Paternal Grandfather     Hemophilia Other     Breast cancer Other         dx @ 60s    Colon cancer Neg Hx     Ovarian cancer Neg Hx      Social History     Socioeconomic History    Marital status: Single   Tobacco Use    Smoking status: Former     Types: Vaping with nicotine    Smokeless tobacco: Never    Tobacco comments:     Stop smoking 2019   Substance and Sexual Activity    Alcohol use: Yes     Alcohol/week: 5.0 standard drinks of alcohol     Types: 5 Glasses of wine per week     Comment: social    Drug use: No    Sexual activity: Yes     Partners: Male     Birth control/protection: Condom, I.U.D.   Other Topics Concern    Are you pregnant or think you may be? No    Breast-feeding No   Social History Narrative    Graduated with masters in public health in May 2015    Currently seeking employment    Working at [employer]  in [employer]    Originally from [place]    Moved to [facility] in 2013, lives alone           Review of Systems - Negative except   Respiratory ROS: no  "dyspnea  Cardiovascular ROS: no exertional chest pain  Gastrointestinal ROS: NO abdominal discomfort,  YES recent rectal bleeding x3  Musculoskeletal ROS: no muscular pain  Neurological ROS: no recent stroke    Physical Exam:  /75 (BP Location: Left arm, Patient Position: Lying)   Pulse 66   Temp 97.9 °F (36.6 °C) (Temporal)   Resp 18   Ht 5' 5" (1.651 m)   Wt 63.5 kg (140 lb)   SpO2 100%   Breastfeeding No   BMI 23.30 kg/m²     General: Alert and oriented x 3. No acute distress. Well-nourished.  HEENT: EOMI. Sclera anicteric. Moist mucous membranes. No scleral icterus. No cervical lymphadenopathy.  Lungs: Clear to auscultation bilaterally. No accessory muscle use.  Cardiac: Regular rate and rhythm. No murmur. No JVD.  Abdomen: soft non tender  Extremities: No edema. Non-tender.?  Skin: No rashes or lesions. Warm.  Neuro: No focal neurological deficits. CN II-XII grossly intact, but not individually tested.  Psych: Cooperative. Appropriate mood and affect.    ASA:  II    PLAN  COLONOSCOPY.    SedationPlan :MAC    The details of the procedure, the possible need for biopsy or polypectomy and the potential risks including bleeding, perforation, missed polyps were discussed in detail.    Jun Castro MD  Colon & Rectal Surgery Fellow      "

## 2023-10-12 NOTE — ANESTHESIA PREPROCEDURE EVALUATION
10/12/2023  Flora Edmond is a 38 y.o., female.      Pre-op Assessment    I have reviewed the Patient Summary Reports.    I have reviewed the NPO Status.   I have reviewed the Medications.     Review of Systems  Anesthesia Hx:  No problems with previous Anesthesia    Social:  Smoker    Hematology/Oncology:  Hematology Normal   Oncology Normal     EENT/Dental:EENT/Dental Normal   Cardiovascular:  Cardiovascular Normal     Pulmonary:  Pulmonary Normal    Renal/:  Renal/ Normal     Hepatic/GI:  Hepatic/GI Normal    Musculoskeletal:  Musculoskeletal Normal    Neurological:  Neurology Normal    Endocrine:  Endocrine Normal    Dermatological:  Skin Normal    Psych:  Psychiatric Normal           Physical Exam  General: Well nourished, Alert and Oriented    Airway:  Mallampati: II   Mouth Opening: Normal  TM Distance: Normal      Dental:  Intact       Patient Active Problem List   Diagnosis    [facility]xa [facility] 2015    HSV-1 (herpes simplex virus 1) infection    Vitamin D deficiency    Family history of hemophilia B    Iron deficiency anemia due to chronic blood loss    Malignant neoplasm of sigmoid colon    Thoracic outlet syndrome    HLA B27 (HLA B27 positive)       Past Medical History:   Diagnosis Date    Allergy     seasonal    Cervical rib     Vitamin D deficiency      Past Surgical History:   Procedure Laterality Date    COLONOSCOPY N/A 6/30/2023    Procedure: COLONOSCOPY;  Surgeon: Andriy Vargas MD;  [facility] ENDOSCOPY;  Service: Endoscopy;  Laterality: N/A;  Sutab  Prep instructions given to pt in clinic -Jm    WISDOM TOOTH EXTRACTION  2007         Anesthesia Plan  Type of Anesthesia, risks & benefits discussed:    Anesthesia Type: Gen Natural Airway, MAC  Intra-op Monitoring Plan: Standard ASA Monitors  Post Op Pain Control Plan: multimodal  Attending Progress Note      PCP: Dmitri Lockett MD    Patient: Marc Post   Gender: female  : 1966   Age: 50 y.o. MRN: [id number]      Date of Admission: 10/13/2019    Chief Complaint:   Chief Complaint   Patient presents with    Abdominal Pain    Emesis           Subjective: comfortable. . abd discomfort . Cecil Brooking No hematemesis . Pain free   No fever/chills , no N/V/diarrhea     Medications:  Reviewed  Infusion Medications    sodium chloride 100 mL/hr at 10/14/19 1225    dextrose 5% and 0.45% NaCl with KCl 20 mEq 100 mL/hr at 10/13/19 1533    pantoprozole (PROTONIX) infusion 8 mg/hr (10/14/19 0301)     Scheduled Medications    atenolol  25 mg Oral Daily    PARoxetine  40 mg Oral Nightly    gabapentin  800 mg Oral TID    levothyroxine  125 mcg Oral Daily    therapeutic multivitamin-minerals  1 tablet Oral Daily    estradiol  0.5 mg Oral Daily    dicyclomine  20 mg Oral Q6H    tiZANidine  4 mg Oral BID    ferrous sulfate  325 mg Oral BID WC    cefUROXime  500 mg Oral BID    busPIRone  10 mg Oral TID    levETIRAcetam  1,000 mg Oral BID    lacosamide  50 mg Oral BID    sodium chloride flush  10 mL Intravenous 2 times per day    acetaminophen  650 mg Oral 3 times per day    calcium-vitamin D  1 tablet Oral BID WC     PRN Meds: oxyCODONE-acetaminophen, sodium chloride flush, magnesium hydroxide, morphine, promethazine, ondansetron      Intake/Output Summary (Last 24 hours) at 10/14/2019 1259  Last data filed at 10/14/2019 0649  Gross per 24 hour   Intake --   Output 100 ml   Net -100 ml       Exam:  BP (!) 177/85   Pulse 54   Temp 97.5 °F (36.4 °C) (Oral)   Resp 16   Ht 5' 4\" (1.626 m)   Wt 255 lb 12.8 oz (116 kg)   SpO2 98%   BMI 43.91 kg/m²   General appearance: No distress,   Respiratory:  symmetrical , good air entry , no Rales , No wheezing, or rhonchi,  Cardiovascular: RRR, with normal S1/S2 without murmurs.   Abdomen : Soft, non-tender, non-distended  , normal bowel analgesia  Induction:  IV  Airway Plan: Direct  Informed Consent: Informed consent signed with the Patient and all parties understand the risks and agree with anesthesia plan.  All questions answered.   ASA Score: 2  Day of Surgery Review of History & Physical: H&P Update referred to the surgeon/provider.    Ready For Surgery From Anesthesia Perspective.     .

## 2023-10-12 NOTE — PROVATION PATIENT INSTRUCTIONS
Discharge Summary/Instructions after an Endoscopic Procedure  Patient Name: Flora Edmond  Patient MRN: [id number]  Patient YOB: 1985 Thursday, October 12, 2023  Alda Partida MD  Dear patient,  As a result of recent federal legislation (The Federal Cures Act), you may   receive lab or pathology results from your procedure in your [facility]   account before your physician is able to contact you. Your physician or   their representative will relay the results to you with their   recommendations at their soonest availability.  Thank you,  RESTRICTIONS:  During your procedure today, you received medications for sedation.  These   medications may affect your judgment, balance and coordination.  Therefore,   for 24 hours, you have the following restrictions:   - DO NOT drive a car, operate machinery, make legal/financial decisions,   sign important papers or drink alcohol.    ACTIVITY:  Today: no heavy lifting, straining or running due to procedural   sedation/anesthesia.  The following day: return to full activity including work.  DIET:  Eat and drink normally unless instructed otherwise.     TREATMENT FOR COMMON SIDE EFFECTS:  - Mild abdominal pain, nausea, belching, bloating or excessive gas:  rest,   eat lightly and use a heating pad.  - Sore Throat: treat with throat lozenges and/or gargle with warm salt   water.  - Because air was used during the procedure, expelling large amounts of air   from your rectum or belching is normal.  - If a bowel prep was taken, you may not have a bowel movement for 1-3 days.    This is normal.  SYMPTOMS TO WATCH FOR AND REPORT TO YOUR PHYSICIAN:  1. Abdominal pain or bloating, other than gas cramps.  2. Chest pain.  3. Back pain.  4. Signs of infection such as: chills or fever occurring within 24 hours   after the procedure.  5. Rectal bleeding, which would show as bright red, maroon, or black stools.   (A tablespoon of blood from the rectum is not serious,  especially if   hemorrhoids are present.)  6. Vomiting.  7. Weakness or dizziness.  GO DIRECTLY TO THE NEAREST EMERGENCY ROOM IF YOU HAVE ANY OF THE FOLLOWING:      Difficulty breathing              Chills and/or fever over 101 F   Persistent vomiting and/or vomiting blood   Severe abdominal pain   Severe chest pain   Black, tarry stools   Bleeding- more than one tablespoon   Any other symptom or condition that you feel may need urgent attention  Your doctor recommends these additional instructions:  If any biopsies were taken, your doctors clinic will contact you in 1 to 2   weeks with any results.  - Discharge patient to home.   - Resume previous diet.   - Continue present medications.   - Await pathology results.   - Repeat colonoscopy in 1 year for surveillance.   - Written discharge instructions were provided to the patient.   - The signs and symptoms of potential delayed complications were discussed   with the patient.   - Patient has a contact number available for emergencies.   - Return to normal activities tomorrow.  For questions, problems or results please call your physician - Alda Partida MD at Work:  [phone].  [facility], EMERGENCY ROOM PHONE NUMBER: [phone]  IF A COMPLICATION OR EMERGENCY SITUATION ARISES AND YOU ARE UNABLE TO REACH   YOUR PHYSICIAN - GO DIRECTLY TO THE EMERGENCY ROOM.  Alda Partida MD  10/12/2023 7:40:57 AM  This report has been verified and signed electronically.  Dear patient,  As a result of recent federal legislation (The Federal Cures Act), you may   receive lab or pathology results from your procedure in your [facility]   account before your physician is able to contact you. Your physician or   their representative will relay the results to you with their   recommendations at their soonest availability.  Thank you,  PROVATION

## 2023-10-12 NOTE — ANESTHESIA RELEASE NOTE
"Anesthesia Release from PACU Note    Patient: Flora Edmond    Procedure(s) Performed: Procedure(s) (LRB):  COLONOSCOPY (N/A)    Anesthesia type: general    Post pain: Adequate analgesia    Post assessment: no apparent anesthetic complications and tolerated procedure well    Last Vitals:   Visit Vitals  /85   Pulse 68   Temp 36.3 °C (97.3 °F)   Resp 16   Ht 5' 5" (1.651 m)   Wt 63.5 kg (140 lb)   SpO2 99%   Breastfeeding No   BMI 23.30 kg/m²       Post vital signs: stable    Level of consciousness: awake and alert     Nausea/Vomiting: no nausea/no vomiting    Complications: none    Airway Patency: patent    Respiratory: unassisted, spontaneous ventilation, room air    Cardiovascular: stable and blood pressure at baseline    Hydration: euvolemic  "

## 2023-10-12 NOTE — TRANSFER OF CARE
"Anesthesia Transfer of Care Note    Patient: Flora Edmond    Procedure(s) Performed: Procedure(s) (LRB):  COLONOSCOPY (N/A)    Patient location: GI    Anesthesia Type: MAC    Transport from OR: Transported from OR on room air with adequate spontaneous ventilation    Post pain: adequate analgesia    Post assessment: no apparent anesthetic complications and tolerated procedure well    Post vital signs: stable    Level of consciousness: awake and alert    Nausea/Vomiting: no nausea/vomiting    Complications: none    Transfer of care protocol was followed      Last vitals:   Visit Vitals  /75 (BP Location: Left arm, Patient Position: Lying)   Pulse 66   Temp 36.6 °C (97.9 °F) (Temporal)   Resp 18   Ht 5' 5" (1.651 m)   Wt 63.5 kg (140 lb)   SpO2 100%   Breastfeeding No   BMI 23.30 kg/m²     "

## 2023-10-12 NOTE — ANESTHESIA POSTPROCEDURE EVALUATION
Anesthesia Post Evaluation    Patient: Flora Edmond    Procedure(s) Performed: Procedure(s) (LRB):  COLONOSCOPY (N/A)    Final Anesthesia Type: general      Patient location during evaluation: GI PACU  Patient participation: Yes- Able to Participate  Level of consciousness: awake and alert  Post-procedure vital signs: reviewed and stable  Pain management: adequate  Airway patency: patent    PONV status at discharge: No PONV  Anesthetic complications: no      Cardiovascular status: hemodynamically stable  Respiratory status: unassisted, spontaneous ventilation and room air  Hydration status: euvolemic  Follow-up not needed.          Vitals Value Taken Time   /85 10/12/23 0811   Temp 36.3 °C (97.3 °F) 10/12/23 0741   Pulse 68 10/12/23 0811   Resp 16 10/12/23 0811   SpO2 99 % 10/12/23 0811         Event Time   Out of Recovery 08:47:03         Pain/Julisa Score: Julisa Score: 10 (10/12/2023  8:11 AM)

## 2023-10-16 LAB
FINAL PATHOLOGIC DIAGNOSIS: NORMAL
GROSS: NORMAL
Lab: NORMAL
MICROSCOPIC EXAM: NORMAL

## 2023-10-18 ENCOUNTER — PATIENT MESSAGE (OUTPATIENT)
Dept: CARDIOLOGY | Facility: CLINIC | Age: 38
End: 2023-10-18
Payer: COMMERCIAL

## 2023-10-18 ENCOUNTER — PATIENT MESSAGE (OUTPATIENT)
Dept: HEMATOLOGY/ONCOLOGY | Facility: CLINIC | Age: 38
End: 2023-10-18
Payer: COMMERCIAL

## 2023-10-26 ENCOUNTER — CLINICAL SUPPORT (OUTPATIENT)
Dept: INFECTIOUS DISEASES | Facility: CLINIC | Age: 38
End: 2023-10-26

## 2023-10-26 ENCOUNTER — OFFICE VISIT (OUTPATIENT)
Dept: HEMATOLOGY/ONCOLOGY | Facility: CLINIC | Age: 38
End: 2023-10-26
Payer: COMMERCIAL

## 2023-10-26 ENCOUNTER — OFFICE VISIT (OUTPATIENT)
Dept: INFECTIOUS DISEASES | Facility: CLINIC | Age: 38
End: 2023-10-26
Payer: COMMERCIAL

## 2023-10-26 ENCOUNTER — LAB VISIT (OUTPATIENT)
Dept: LAB | Facility: HOSPITAL | Age: 38
End: 2023-10-26
Attending: INTERNAL MEDICINE
Payer: COMMERCIAL

## 2023-10-26 VITALS
HEIGHT: 65 IN | DIASTOLIC BLOOD PRESSURE: 74 MMHG | HEART RATE: 81 BPM | WEIGHT: 152.69 LBS | OXYGEN SATURATION: 96 % | RESPIRATION RATE: 18 BRPM | BODY MASS INDEX: 25.44 KG/M2 | TEMPERATURE: 98 F | SYSTOLIC BLOOD PRESSURE: 104 MMHG

## 2023-10-26 VITALS
SYSTOLIC BLOOD PRESSURE: 116 MMHG | DIASTOLIC BLOOD PRESSURE: 76 MMHG | TEMPERATURE: 98 F | BODY MASS INDEX: 25.3 KG/M2 | HEIGHT: 65 IN | HEART RATE: 88 BPM | WEIGHT: 151.88 LBS

## 2023-10-26 DIAGNOSIS — C18.7 MALIGNANT NEOPLASM OF SIGMOID COLON: ICD-10-CM

## 2023-10-26 DIAGNOSIS — Z23 IMMUNIZATION DUE: ICD-10-CM

## 2023-10-26 DIAGNOSIS — E55.9 VITAMIN D DEFICIENCY: ICD-10-CM

## 2023-10-26 DIAGNOSIS — D50.0 IRON DEFICIENCY ANEMIA DUE TO CHRONIC BLOOD LOSS: ICD-10-CM

## 2023-10-26 DIAGNOSIS — Z71.84 TRAVEL ADVICE ENCOUNTER: Primary | ICD-10-CM

## 2023-10-26 DIAGNOSIS — M25.551 RIGHT HIP PAIN: ICD-10-CM

## 2023-10-26 DIAGNOSIS — C18.7 MALIGNANT NEOPLASM OF SIGMOID COLON: Primary | ICD-10-CM

## 2023-10-26 LAB
ALBUMIN SERPL BCP-MCNC: 3.9 G/DL (ref 3.5–5.2)
ALP SERPL-CCNC: 51 U/L (ref 55–135)
ALT SERPL W/O P-5'-P-CCNC: 12 U/L (ref 10–44)
ANION GAP SERPL CALC-SCNC: 10 MMOL/L (ref 8–16)
AST SERPL-CCNC: 15 U/L (ref 10–40)
BILIRUB SERPL-MCNC: 0.5 MG/DL (ref 0.1–1)
BUN SERPL-MCNC: 9 MG/DL (ref 6–20)
CALCIUM SERPL-MCNC: 9.6 MG/DL (ref 8.7–10.5)
CHLORIDE SERPL-SCNC: 104 MMOL/L (ref 95–110)
CO2 SERPL-SCNC: 23 MMOL/L (ref 23–29)
CREAT SERPL-MCNC: 0.7 MG/DL (ref 0.5–1.4)
ERYTHROCYTE [DISTWIDTH] IN BLOOD BY AUTOMATED COUNT: 14.9 % (ref 11.5–14.5)
EST. GFR  (NO RACE VARIABLE): >60 ML/MIN/1.73 M^2
FERRITIN SERPL-MCNC: 49 NG/ML (ref 20–300)
GLUCOSE SERPL-MCNC: 112 MG/DL (ref 70–110)
HCT VFR BLD AUTO: 40.4 % (ref 37–48.5)
HGB BLD-MCNC: 12.8 G/DL (ref 12–16)
IMM GRANULOCYTES # BLD AUTO: 0.04 K/UL (ref 0–0.04)
IRON SERPL-MCNC: 109 UG/DL (ref 30–160)
MCH RBC QN AUTO: 27.7 PG (ref 27–31)
MCHC RBC AUTO-ENTMCNC: 31.7 G/DL (ref 32–36)
MCV RBC AUTO: 87 FL (ref 82–98)
NEUTROPHILS # BLD AUTO: 3.4 K/UL (ref 1.8–7.7)
PLATELET # BLD AUTO: 261 K/UL (ref 150–450)
PMV BLD AUTO: 9.3 FL (ref 9.2–12.9)
POTASSIUM SERPL-SCNC: 4 MMOL/L (ref 3.5–5.1)
PROT SERPL-MCNC: 7.2 G/DL (ref 6–8.4)
RBC # BLD AUTO: 4.62 M/UL (ref 4–5.4)
SATURATED IRON: 25 % (ref 20–50)
SODIUM SERPL-SCNC: 137 MMOL/L (ref 136–145)
TOTAL IRON BINDING CAPACITY: 443 UG/DL (ref 250–450)
TRANSFERRIN SERPL-MCNC: 299 MG/DL (ref 200–375)
WBC # BLD AUTO: 5.9 K/UL (ref 3.9–12.7)

## 2023-10-26 PROCEDURE — 85027 COMPLETE CBC AUTOMATED: CPT | Performed by: INTERNAL MEDICINE

## 2023-10-26 PROCEDURE — 99999 PR PBB SHADOW E&M-EST. PATIENT-LVL I: CPT | Mod: PBBFAC,,,

## 2023-10-26 PROCEDURE — 99999 PR PBB SHADOW E&M-EST. PATIENT-LVL IV: ICD-10-PCS | Mod: PBBFAC,,, | Performed by: INTERNAL MEDICINE

## 2023-10-26 PROCEDURE — 3044F PR MOST RECENT HEMOGLOBIN A1C LEVEL <7.0%: ICD-10-PCS | Mod: CPTII,S$GLB,, | Performed by: INTERNAL MEDICINE

## 2023-10-26 PROCEDURE — 99999 PR PBB SHADOW E&M-EST. PATIENT-LVL IV: CPT | Mod: PBBFAC,,, | Performed by: INTERNAL MEDICINE

## 2023-10-26 PROCEDURE — 83540 ASSAY OF IRON: CPT | Performed by: INTERNAL MEDICINE

## 2023-10-26 PROCEDURE — 90713 POLIOVIRUS IPV SC/IM: CPT | Mod: S$GLB,,, | Performed by: INTERNAL MEDICINE

## 2023-10-26 PROCEDURE — 3008F PR BODY MASS INDEX (BMI) DOCUMENTED: ICD-10-PCS | Mod: CPTII,S$GLB,, | Performed by: INTERNAL MEDICINE

## 2023-10-26 PROCEDURE — 80053 COMPREHEN METABOLIC PANEL: CPT | Performed by: INTERNAL MEDICINE

## 2023-10-26 PROCEDURE — 3078F PR MOST RECENT DIASTOLIC BLOOD PRESSURE < 80 MM HG: ICD-10-PCS | Mod: CPTII,S$GLB,, | Performed by: INTERNAL MEDICINE

## 2023-10-26 PROCEDURE — 90734 MENACWYD/MENACWYCRM VACC IM: CPT | Mod: S$GLB,,, | Performed by: INTERNAL MEDICINE

## 2023-10-26 PROCEDURE — 90472 IMMUNIZATION ADMIN EACH ADD: CPT | Mod: S$GLB,,, | Performed by: INTERNAL MEDICINE

## 2023-10-26 PROCEDURE — 99214 OFFICE O/P EST MOD 30 MIN: CPT | Mod: S$GLB,,, | Performed by: INTERNAL MEDICINE

## 2023-10-26 PROCEDURE — 1159F PR MEDICATION LIST DOCUMENTED IN MEDICAL RECORD: ICD-10-PCS | Mod: CPTII,S$GLB,, | Performed by: INTERNAL MEDICINE

## 2023-10-26 PROCEDURE — 90691 TYPHOID VACCINE IM: CPT | Mod: S$GLB,,, | Performed by: INTERNAL MEDICINE

## 2023-10-26 PROCEDURE — 3078F DIAST BP <80 MM HG: CPT | Mod: CPTII,S$GLB,, | Performed by: INTERNAL MEDICINE

## 2023-10-26 PROCEDURE — 1159F MED LIST DOCD IN RCRD: CPT | Mod: CPTII,S$GLB,, | Performed by: INTERNAL MEDICINE

## 2023-10-26 PROCEDURE — 99214 PR OFFICE/OUTPT VISIT, EST, LEVL IV, 30-39 MIN: ICD-10-PCS | Mod: S$GLB,,, | Performed by: INTERNAL MEDICINE

## 2023-10-26 PROCEDURE — 99402 PREV MED CNSL INDIV APPRX 30: CPT | Mod: S$GLB,,, | Performed by: INTERNAL MEDICINE

## 2023-10-26 PROCEDURE — 90471 IMMUNIZATION ADMIN: CPT | Mod: S$GLB,,, | Performed by: INTERNAL MEDICINE

## 2023-10-26 PROCEDURE — 81374 HLA I TYPING 1 ANTIGEN LR: CPT | Mod: PO | Performed by: INTERNAL MEDICINE

## 2023-10-26 PROCEDURE — 3074F SYST BP LT 130 MM HG: CPT | Mod: CPTII,S$GLB,, | Performed by: INTERNAL MEDICINE

## 2023-10-26 PROCEDURE — 1160F PR REVIEW ALL MEDS BY PRESCRIBER/CLIN PHARMACIST DOCUMENTED: ICD-10-PCS | Mod: CPTII,S$GLB,, | Performed by: INTERNAL MEDICINE

## 2023-10-26 PROCEDURE — 3044F HG A1C LEVEL LT 7.0%: CPT | Mod: CPTII,S$GLB,, | Performed by: INTERNAL MEDICINE

## 2023-10-26 PROCEDURE — 90472 TYPHOID VICPS VACCINE IM: ICD-10-PCS | Mod: S$GLB,,, | Performed by: INTERNAL MEDICINE

## 2023-10-26 PROCEDURE — 36415 COLL VENOUS BLD VENIPUNCTURE: CPT | Performed by: INTERNAL MEDICINE

## 2023-10-26 PROCEDURE — 90713 POLIOVIRUS VACCINE IPV SQ/IM: ICD-10-PCS | Mod: S$GLB,,, | Performed by: INTERNAL MEDICINE

## 2023-10-26 PROCEDURE — 82728 ASSAY OF FERRITIN: CPT | Performed by: INTERNAL MEDICINE

## 2023-10-26 PROCEDURE — 99999 PR PBB SHADOW E&M-EST. PATIENT-LVL III: CPT | Mod: PBBFAC,,, | Performed by: INTERNAL MEDICINE

## 2023-10-26 PROCEDURE — 90691 TYPHOID VICPS VACCINE IM: ICD-10-PCS | Mod: S$GLB,,, | Performed by: INTERNAL MEDICINE

## 2023-10-26 PROCEDURE — 3008F BODY MASS INDEX DOCD: CPT | Mod: CPTII,S$GLB,, | Performed by: INTERNAL MEDICINE

## 2023-10-26 PROCEDURE — 99999 PR PBB SHADOW E&M-EST. PATIENT-LVL III: ICD-10-PCS | Mod: PBBFAC,,, | Performed by: INTERNAL MEDICINE

## 2023-10-26 PROCEDURE — 84466 ASSAY OF TRANSFERRIN: CPT | Performed by: INTERNAL MEDICINE

## 2023-10-26 PROCEDURE — 90471 POLIOVIRUS VACCINE IPV SQ/IM: ICD-10-PCS | Mod: S$GLB,,, | Performed by: INTERNAL MEDICINE

## 2023-10-26 PROCEDURE — 3074F PR MOST RECENT SYSTOLIC BLOOD PRESSURE < 130 MM HG: ICD-10-PCS | Mod: CPTII,S$GLB,, | Performed by: INTERNAL MEDICINE

## 2023-10-26 PROCEDURE — 90734 MENINGOCOCCAL CONJUGATE VACCINE 4-VALENT IM (MENVEO) 1 VIAL AGES 10 YEARS-55 YEARS: ICD-10-PCS | Mod: S$GLB,,, | Performed by: INTERNAL MEDICINE

## 2023-10-26 PROCEDURE — 99402 PR PREVENT COUNSEL,INDIV,30 MIN: ICD-10-PCS | Mod: S$GLB,,, | Performed by: INTERNAL MEDICINE

## 2023-10-26 PROCEDURE — 99999 PR PBB SHADOW E&M-EST. PATIENT-LVL I: ICD-10-PCS | Mod: PBBFAC,,,

## 2023-10-26 PROCEDURE — 1160F RVW MEDS BY RX/DR IN RCRD: CPT | Mod: CPTII,S$GLB,, | Performed by: INTERNAL MEDICINE

## 2023-10-26 RX ORDER — ATOVAQUONE AND PROGUANIL HYDROCHLORIDE 250; 100 MG/1; MG/1
TABLET, FILM COATED ORAL
Qty: 24 TABLET | Refills: 0 | Status: SHIPPED | OUTPATIENT
Start: 2023-10-26

## 2023-10-26 RX ORDER — AZITHROMYCIN 500 MG/1
500 TABLET, FILM COATED ORAL DAILY
Qty: 3 TABLET | Refills: 0 | Status: SHIPPED | OUTPATIENT
Start: 2023-10-26 | End: 2023-10-29

## 2023-10-26 NOTE — PROGRESS NOTES
Patient received 2 vaccines IM to the right deltoid, polio anterior, and menveo posterior.  And also typhoid IM to the left deltoid.  Tolerated well and left in NAD

## 2023-10-26 NOTE — PROGRESS NOTES
Subjective:      Chief Complaint:   Chief Complaint   Patient presents with    Travel Consult     History of Present Illness    Patient  38 y.o. female who presents today for routine pretravel consultation.  The patient reports a past medical history of colon cancer s/p surgery.  The patient reports the following medication allergies; none.  The patient reports the following food allergies; none .  The patient will be traveling to [facility] on November 3.  The patient will be at this destination for 7 days.  The patient will be lodging at hotels in [facility].  The patient has travelled to the following other countries in the past; [place], [place].  The patient reports that they received all their childhood vaccinations.  The patient reports receipt of the following travel related vaccinations; Yellow fever in 2011, hepatitis A X2 2011 and 2014, hepatitis B titer +.  The purpose of this trip is work.  She will be working with [employer] [place].  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        Review of Systems   All other systems reviewed and are negative.      Objective:   Physical Exam   Assessment:     Pre-Travel clinic assessment    Plan:   Patient specific risks:      Patient had recent surgery for colon cancer.  Per patient, she is free from cancer.    Destination specific risks:      -Infectious Disease risks:       Mosquito Borne pathogens:  Reviewed basic mosquito avoidance precautions including wearing long sleeve clothing and insect repellant.  She has had yellow fever vaccine previously.  Will prescribe malarone for malaria prevention.     Food Borne pathogens:  Reviewed basic hand, food and water sanitation precautions.  Patient instructed to take hand  on their trip.  She has had hepatitis A X2 in the past.  Typhoid IM ordered today.  Adult polio booster ordered.  Azithromycin as needed for severe diarrhea.     Blood Borne Pathogens:  She is immune to hepatitis b.     Routine:   She has had influenza and covid booster in September.  She had tetanus vaccine in 2021.  Meningococcal vaccine today.    30 minutes of time was spent on this encounter.

## 2023-10-26 NOTE — PROGRESS NOTES
MEDICAL ONCOLOGY - ESTABLISHED PATIENT VISIT    Reason for visit: Colon cancer    Best Contact Phone Number(s): [phone] (home)      Cancer/Stage/TNM:    Cancer Staging   No matching staging information was found for the patient.     Oncology History    No history exists.        Interim History:   38 y.o. female with thoracic outlet syndrome and sigmoid colon cancer s/p laparoscopic sigmoidectomy with Dr. Nesbitt on 7/15/23 who presents for follow-up.  She is feeling well physically.  She has since had her completion colonoscopy with Dr. Partida 10/12 which revealed two polyps, one tubular adenoma and one was a hyperplastic polyp.  She has normal bowel movements, no abdominal pain.  Had [facility] ctDNA drawn 8/1/23 which was negative.      She is alone today.  ECOG PS 0.    ROS:   Review of Systems   Constitutional:  Negative for malaise/fatigue and weight loss.   HENT:  Negative for sore throat.    Eyes:  Negative for blurred vision and pain.   Respiratory:  Negative for cough and shortness of breath.    Cardiovascular:  Negative for chest pain and leg swelling.   Gastrointestinal:  Negative for abdominal pain, constipation, diarrhea, nausea and vomiting.   Genitourinary:  Negative for dysuria and frequency.   Musculoskeletal:  Positive for joint pain (R hip). Negative for back pain, falls and myalgias.   Skin:  Negative for rash.   Neurological:  Negative for weakness and headaches.   Endo/Heme/Allergies:  Does not bruise/bleed easily.   Psychiatric/Behavioral:  Negative for depression. The patient is not nervous/anxious.    All other systems reviewed and are negative.      Past Medical History:   Past Medical History:   Diagnosis Date    Allergy     seasonal    Cervical rib     Vitamin D deficiency         Past Surgical History:   Past Surgical History:   Procedure Laterality Date    COLONOSCOPY N/A 6/30/2023    Procedure: COLONOSCOPY;  Surgeon: Andriy Vargas MD;  Location: [facility] ENDOSCOPY;  Service:  Endoscopy;  Laterality: N/A;  Sutab  Prep instructions given to pt in clinic -    COLONOSCOPY N/A 10/12/2023    Procedure: COLONOSCOPY;  Surgeon: Adla Partida MD;  Location: [facility] ([address] [place] [address]);  Service: Endoscopy;  Laterality: N/A;  8/21- referred to Dr. Partida/ Prep instructions to portal. AS.pt request sooner appt with the first available with any provider. r/s 10.13.23 with  at [facility]  8/30-pt to be done with Dr. Partida-MS  10/5-precall complete-MS    WISDOM TOOTH EXTRACTION  2007        Family History:   Family History   Problem Relation Age of Onset    Breast cancer Maternal Grandmother 61    Hemophilia Father         Factor IX    Prostate cancer Father 72        Emi 6 or 7    Hyperlipidemia Father     Coronary artery disease Father     Heart disease Father     Thyroid disease Mother     Breast cancer Mother 72        triple negative, CHEK2 VUS    Asthma Mother     No Known Problems Sister     Prostate cancer Paternal Uncle 55    Prostate cancer Paternal Grandfather 70    COPD Paternal Grandfather     Heart disease Paternal Grandfather     Hemophilia Other     Breast cancer Other         dx @ 60s    Colon cancer Neg Hx     Ovarian cancer Neg Hx         Social History:   Social History     Tobacco Use    Smoking status: Former     Types: Vaping with nicotine    Smokeless tobacco: Never    Tobacco comments:     Stop smoking 2019   Substance Use Topics    Alcohol use: Yes     Alcohol/week: 5.0 standard drinks of alcohol     Types: 5 Glasses of wine per week     Comment: social        I have reviewed and updated the patient's past medical, surgical, family and social histories.    Allergies:   Review of patient's allergies indicates:   Allergen Reactions    Cat/feline products Itching        Medications:   Current Outpatient Medications   Medication Sig Dispense Refill    acetaminophen (TYLENOL) 325 MG tablet Take 650 mg by mouth.      atovaquone-proguaniL (MALARONE) 250-100 mg Tab Take one  tablet daily for malaria prevention. Begin one day before entering malarious area and continue for 1 week after return. 24 tablet 0    azithromycin (ZITHROMAX) 500 MG tablet Take 1 tablet (500 mg total) by mouth once daily. Take one tablet once a day x 3 days in the event of diarrhea. for 3 days 3 tablet 0    buPROPion (WELLBUTRIN XL) 150 MG TB24 tablet TAKE 1 TABLET BY MOUTH DAILY FIRST 3 DAYS, WHEN STARTING MEDICATION, THEN 2 TABLETS DAILY THEREAFTER. (Patient not taking: Reported on 10/26/2023) 60 tablet 5    busPIRone (BUSPAR) 10 MG tablet Take 1/2 tablet (5 mg) three times daily for first week, then 1 tablet twice daily thereafter (Patient not taking: Reported on 10/26/2023) 60 tablet 2    docusate sodium (COLACE) 100 MG capsule Take 100 mg by mouth.      hydrocortisone (ANUSOL-HC) 2.5 % rectal cream Place rectally 2 (two) times daily. (Patient not taking: Reported on 10/26/2023) 28 g 2    ibuprofen (ADVIL,MOTRIN) 200 MG tablet Take 400 mg by mouth.      levonorgestreL (MIRENA) 21 mcg/24 hours (8 yrs) 52 mg IUD by Intrauterine route.      loratadine-pseudoephedrine 5-120 mg (CLARITIN-D 12-HOUR) 5-120 mg per tablet Take by mouth every 12 (twelve) hours.      methocarbamoL (ROBAXIN) 500 MG Tab Take by mouth.      ondansetron (ZOFRAN-ODT) 8 MG TbDL Take 1 tablet (8 mg total) by mouth every 8 (eight) hours as needed (nausea). (Patient not taking: Reported on 10/26/2023) 30 tablet 0    sod sulf-pot chloride-mag sulf (SUTAB) 1.479-0.188- 0.225 gram tablet Take 12 tablets by mouth once daily. Take according to package instructions with indicated amount of water. BIN: [id number]  PCN: ANA  GROUP: [id number] MEMBER ID: [id number] (Patient not taking: Reported on 10/26/2023) 24 tablet 0    traMADoL (ULTRAM) 50 mg tablet Take 1 tablet (50 mg total) by mouth every 8 (eight) hours as needed for Pain. (Patient not taking: Reported on 10/26/2023) 20 tablet 0     No current facility-administered medications for this visit.     "    Physical Exam:   /74 (BP Location: Left arm, Patient Position: Sitting, BP Method: Medium (Automatic))   Pulse 81   Temp 97.8 °F (36.6 °C) (Oral)   Resp 18   Ht 5' 5" (1.651 m)   Wt 69.3 kg (152 lb 10.7 oz)   SpO2 96%   BMI 25.41 kg/m²      ECOG Performance status: 0            Physical Exam  Vitals reviewed.   Constitutional:       General: She is not in acute distress.     Appearance: Normal appearance. She is normal weight.   HENT:      Head: Normocephalic and atraumatic.      Right Ear: External ear normal.      Left Ear: External ear normal.      Nose: Nose normal.      Mouth/Throat:      Mouth: Mucous membranes are moist.      Pharynx: Oropharynx is clear. No posterior oropharyngeal erythema.   Eyes:      General: No scleral icterus.     Extraocular Movements: Extraocular movements intact.      Conjunctiva/sclera: Conjunctivae normal.      Pupils: Pupils are equal, round, and reactive to light.   Cardiovascular:      Rate and Rhythm: Normal rate and regular rhythm.      Pulses: Normal pulses.      Heart sounds: Normal heart sounds.   Pulmonary:      Effort: Pulmonary effort is normal.      Breath sounds: Normal breath sounds. No wheezing or rales.   Abdominal:      General: Bowel sounds are normal. There is no distension.      Palpations: Abdomen is soft.      Tenderness: There is no abdominal tenderness.   Musculoskeletal:         General: No swelling. Normal range of motion.      Cervical back: Normal range of motion and neck supple.   Skin:     General: Skin is warm.      Coloration: Skin is not jaundiced.      Findings: No erythema or rash.   Neurological:      General: No focal deficit present.      Mental Status: She is alert and oriented to person, place, and time. Mental status is at baseline.      Gait: Gait normal.   Psychiatric:         Mood and Affect: Mood normal.         Behavior: Behavior normal.         Thought Content: Thought content normal.         Judgment: Judgment normal. "           Labs:   Recent Results (from the past 48 hour(s))   CBC Oncology    Collection Time: 10/26/23  3:15 PM   Result Value Ref Range    WBC 5.90 3.90 - 12.70 K/uL    RBC 4.62 4.00 - 5.40 M/uL    Hemoglobin 12.8 12.0 - 16.0 g/dL    Hematocrit 40.4 37.0 - 48.5 %    MCV 87 82 - 98 fL    MCH 27.7 27.0 - 31.0 pg    MCHC 31.7 (L) 32.0 - 36.0 g/dL    RDW 14.9 (H) 11.5 - 14.5 %    Platelets 261 150 - 450 K/uL    MPV 9.3 9.2 - 12.9 fL    Gran # (ANC) 3.4 1.8 - 7.7 K/uL    Immature Grans (Abs) 0.04 0.00 - 0.04 K/uL   Comprehensive Metabolic Panel    Collection Time: 10/26/23  3:15 PM   Result Value Ref Range    Sodium 137 136 - 145 mmol/L    Potassium 4.0 3.5 - 5.1 mmol/L    Chloride 104 95 - 110 mmol/L    CO2 23 23 - 29 mmol/L    Glucose 112 (H) 70 - 110 mg/dL    BUN 9 6 - 20 mg/dL    Creatinine 0.7 0.5 - 1.4 mg/dL    Calcium 9.6 8.7 - 10.5 mg/dL    Total Protein 7.2 6.0 - 8.4 g/dL    Albumin 3.9 3.5 - 5.2 g/dL    Total Bilirubin 0.5 0.1 - 1.0 mg/dL    Alkaline Phosphatase 51 (L) 55 - 135 U/L    AST 15 10 - 40 U/L    ALT 12 10 - 44 U/L    eGFR >60.0 >60 mL/min/1.73 m^2    Anion Gap 10 8 - 16 mmol/L   FERRITIN    Collection Time: 10/26/23  3:15 PM   Result Value Ref Range    Ferritin 49 20.0 - 300.0 ng/mL   IRON AND TIBC    Collection Time: 10/26/23  3:15 PM   Result Value Ref Range    Iron 109 30 - 160 ug/dL    Transferrin 299 200 - 375 mg/dL    TIBC 443 250 - 450 ug/dL    Saturated Iron 25 20 - 50 %        I have reviewed the pertinent labs from 10/26/23 which are notable for improved hgb.  Normal LFTs and Cr.  CEA pending.    Imaging:       I have personally reviewed the 6/30/23 CT imaging which is notable for sigmoid colon thickening.  No clear evidence of distant metastatic disease.    Path:   Final Pathologic Diagnosis     1. Sigmoid colon, mass, biopsy:   Invasive adenocarcinoma.  VC      Comment: Interp By Herbert Beard M.D., Signed on 07/10/2023 at 10:18   Supplemental Diagnosis     Immunohistochemistry (IHC)  Testing for Mismatch Repair (MMR) Proteins     MLH1   Intact nuclear expression     MSH2   Intact nuclear expression     MSH6   Intact nuclear expression     PMS2   Intact nuclear expression     Background nonneoplastic tissue/internal control with intact nuclear expression     IHC Interpretation   No loss of nuclear expression of MMR proteins: low probability of microsatellite instability-high (MSI-H)          7/15/23 Sigmoidectomy:     COLON AND RECTUM: Resection, Including Transanal Disk Excision of Rectal Neoplasms   COLON AND RECTUM: RESECTION - All Specimens   8th Edition - Protocol posted: 6/22/2022     SPECIMEN      Procedure:    Sigmoidectomy     TUMOR      Tumor Site:    Sigmoid colon      Histologic Type:    Adenocarcinoma      Histologic Grade:    G2, moderately differentiated      Tumor Size:    Greatest dimension (Centimeters): 5.5 cm      Tumor Extent:    Invades through muscularis propria into the pericolonic or perirectal tissue      Macroscopic Tumor Perforation:    Not identified      Lymphovascular Invasion:    Not identified      Perineural Invasion:    Not identified      Tumor Bud Score:    Low (0-4)      Treatment Effect:    No known presurgical therapy     MARGINS      Margin Status for Invasive Carcinoma:    All margins negative for invasive carcinoma        Closest Margin(s) to Invasive Carcinoma:    Proximal        Distance from Invasive Carcinoma to Closest Margin:    2.8 cm      Margin Status for Non-Invasive Tumor:    All margins negative for high-grade dysplasia / intramucosal carcinoma and low-grade dysplasia     REGIONAL LYMPH NODES      Regional Lymph Node Status:            :    All regional lymph nodes negative for tumor        Number of Lymph Nodes Examined:    55      Tumor Deposits:    Not identified     PATHOLOGIC STAGE CLASSIFICATION (pTNM, AJCC 8th Edition)      Reporting of pT, pN, and (when applicable) pM categories is based on information available to the pathologist at  "the time the report is issued. As per the AJCC (Chapter 1, 8th Ed.) it is the managing physician's responsibility to establish the final pathologic stage based upon all pertinent information, including but potentially not limited to this pathology report.      pT Category:    pT3      pN Category:    pN0            Assessment:       1. Malignant neoplasm of sigmoid colon    2. Right hip pain    3. Vitamin D deficiency    4. Iron deficiency anemia due to chronic blood loss              Plan:             # Sigmoid colon cancer  She presented with several months of symptoms and was found to have a sigmoid colon adenocarcinoma, pMMR on 6/30/23 colonoscopy.  She had no clear evidence of distant metastatic disease on her pre-operative CT imaging. Saw Dr. Partida here and then went to [facility] for surgical consultation.  MRI there showed T4a N1 disease.  Underwent laparoscopic sigmoidectomy with Dr. Nesbitt on 7/15/23 without need for ostomy.  Regained bowel function and returned to [facility] 7/18/23.    Pathology from sigmoidectomy showed pT3N0 tumor with 55 negative lymph nodes, no LVI or PNI, negative margins.  Low tumor budding score, no perforation.    I discussed with her that the data are mixed on "low risk" stage II colon adenocarcinoma, pMMR, but I generally recommend active surveillance alone.  Another reasonable approach is six months of single agent capecitabine or 5-FU/LV.  There is not a survival benefit from the addition of oxaliplatin in this setting as per the MOSAIC study.  She is considering her options.    Completion colonoscopy 10/11/23 with Dr. Partida - 2 polyps removed.  Next in 10/2024.    She underwent ctDNA testing with [facility] on 8/1/23 with mobile phlebotomy.  This was negative.    Will set her up for follow-up [facility].     Plan to return in 3 months with CBC, CMP, CEA & CT CAP    # R hip pain  Chronic, intermittent.  (+) for HLA B27.  Saw Dr. West who did not feel that her presentation " was not consistent with ankylosing spondylitis.  He reviewed her prior imaging.    # Vit D Deficiency  She is off vitamin D.  Had some prior osteopenia that improved.    # Iron deficiency anemia  Resolved.  Secondary to prior cancer.  Received IV Venofer.    Follow up: 3 months.    The above information has been reviewed with the patient and all questions have been answered to their apparent satisfaction.  They understand that they can call the clinic with any questions.      Surinder Youssef MD  Hematology/Oncology  [facility]      Route Chart for Scheduling    Med Onc Chart Routing      Follow up with physician 3 months. with CT prior; week of 1/15/23   Follow up with [facility]    Infusion scheduling note    Injection scheduling note    Labs CBC, CMP and CEA   Scheduling:  Preferred lab:  Lab interval:     Imaging CT chest abdomen pelvis      Pharmacy appointment    Other referrals                    Therapy Plan Information  Medications  iron sucrose (VENOFER) 300 mg in sodium chloride 0.9% 250 mL IVPB  300 mg, Intravenous, 1 time a week  Anaphylaxis/Hypersensitivity  EPINEPHrine (EPIPEN) 0.3 mg/0.3 mL pen injection 0.3 mg  0.3 mg, Intramuscular, PRN  diphenhydrAMINE injection 50 mg  50 mg, Intravenous, PRN  hydrocortisone sodium succinate injection 100 mg  100 mg, Intravenous, PRN  Flushes  sodium chloride 0.9% 250 mL flush bag  Intravenous, 1 time a week  sodium chloride 0.9% flush 10 mL  10 mL, Intravenous, 1 time a week  heparin, porcine (PF) 100 unit/mL injection flush 500 Units  500 Units, Intravenous, 1 time a week  alteplase injection 2 mg  2 mg, Intra-Catheter, 1 time a week

## 2023-10-27 DIAGNOSIS — C18.7 MALIGNANT NEOPLASM OF SIGMOID COLON: Primary | ICD-10-CM

## 2023-11-02 ENCOUNTER — PATIENT MESSAGE (OUTPATIENT)
Dept: HEMATOLOGY/ONCOLOGY | Facility: CLINIC | Age: 38
End: 2023-11-02
Payer: COMMERCIAL

## 2023-11-17 ENCOUNTER — PATIENT MESSAGE (OUTPATIENT)
Dept: HEMATOLOGY/ONCOLOGY | Facility: CLINIC | Age: 38
End: 2023-11-17
Payer: COMMERCIAL

## 2023-11-17 LAB
HLA B27 INTERPRETATION: NORMAL
HLA-B27 RELATED AG QL: NORMAL
HLA-B27 RELATED AG QL: POSITIVE

## 2024-01-08 ENCOUNTER — OFFICE VISIT (OUTPATIENT)
Dept: PRIMARY CARE CLINIC | Facility: CLINIC | Age: 39
End: 2024-01-08
Payer: COMMERCIAL

## 2024-01-08 VITALS
HEART RATE: 105 BPM | HEIGHT: 65 IN | RESPIRATION RATE: 17 BRPM | SYSTOLIC BLOOD PRESSURE: 104 MMHG | WEIGHT: 151.81 LBS | OXYGEN SATURATION: 99 % | DIASTOLIC BLOOD PRESSURE: 72 MMHG | BODY MASS INDEX: 25.29 KG/M2 | TEMPERATURE: 98 F

## 2024-01-08 DIAGNOSIS — H10.9 CONJUNCTIVITIS OF BOTH EYES, UNSPECIFIED CONJUNCTIVITIS TYPE: Primary | ICD-10-CM

## 2024-01-08 DIAGNOSIS — J06.9 ACUTE URI: ICD-10-CM

## 2024-01-08 PROCEDURE — 3074F SYST BP LT 130 MM HG: CPT | Mod: CPTII,S$GLB,, | Performed by: STUDENT IN AN ORGANIZED HEALTH CARE EDUCATION/TRAINING PROGRAM

## 2024-01-08 PROCEDURE — 99999 PR PBB SHADOW E&M-EST. PATIENT-LVL V: CPT | Mod: PBBFAC,,, | Performed by: STUDENT IN AN ORGANIZED HEALTH CARE EDUCATION/TRAINING PROGRAM

## 2024-01-08 PROCEDURE — 99214 OFFICE O/P EST MOD 30 MIN: CPT | Mod: S$GLB,,, | Performed by: STUDENT IN AN ORGANIZED HEALTH CARE EDUCATION/TRAINING PROGRAM

## 2024-01-08 PROCEDURE — 1160F RVW MEDS BY RX/DR IN RCRD: CPT | Mod: CPTII,S$GLB,, | Performed by: STUDENT IN AN ORGANIZED HEALTH CARE EDUCATION/TRAINING PROGRAM

## 2024-01-08 PROCEDURE — 3078F DIAST BP <80 MM HG: CPT | Mod: CPTII,S$GLB,, | Performed by: STUDENT IN AN ORGANIZED HEALTH CARE EDUCATION/TRAINING PROGRAM

## 2024-01-08 PROCEDURE — 3008F BODY MASS INDEX DOCD: CPT | Mod: CPTII,S$GLB,, | Performed by: STUDENT IN AN ORGANIZED HEALTH CARE EDUCATION/TRAINING PROGRAM

## 2024-01-08 PROCEDURE — 1159F MED LIST DOCD IN RCRD: CPT | Mod: CPTII,S$GLB,, | Performed by: STUDENT IN AN ORGANIZED HEALTH CARE EDUCATION/TRAINING PROGRAM

## 2024-01-08 RX ORDER — DOXYCYCLINE 100 MG/1
100 CAPSULE ORAL EVERY 12 HOURS
Qty: 20 CAPSULE | Refills: 0 | Status: SHIPPED | OUTPATIENT
Start: 2024-01-08 | End: 2024-01-18

## 2024-01-08 RX ORDER — CODEINE PHOSPHATE AND GUAIFENESIN 10; 100 MG/5ML; MG/5ML
5 SOLUTION ORAL EVERY 6 HOURS PRN
Qty: 120 ML | Refills: 0 | Status: SHIPPED | OUTPATIENT
Start: 2024-01-08

## 2024-01-08 RX ORDER — BENZONATATE 200 MG/1
200 CAPSULE ORAL 3 TIMES DAILY PRN
Qty: 30 CAPSULE | Refills: 0 | Status: SHIPPED | OUTPATIENT
Start: 2024-01-08 | End: 2024-01-18

## 2024-01-08 RX ORDER — ERYTHROMYCIN 5 MG/G
OINTMENT OPHTHALMIC 3 TIMES DAILY
Qty: 3.5 G | Refills: 0 | Status: SHIPPED | OUTPATIENT
Start: 2024-01-08

## 2024-01-08 NOTE — PROGRESS NOTES
"Subjective:       Patient ID: Flora Edmond is a 38 y.o. female.    Chief Complaint: URI (Mild fever, watery eyes/crusty), Sinus Problem, Fatigue, and Cough    HPI:  38 y.o. female presents to [facility] with complaints of URI symptoms    Patient reports symptoms began 1/2/2024. Felt extremely fatigued, subjective fevers, productive cough and nasal/sinus congestion. Taking lots of hot showers. Claratin-D helps. Ibuprofen daily and Tylenol PM at night. Eyes can be watery and a little crusted in the morning.    Sick contacts?: Whole family is sick  Fever?: Subjective  Shortness of breath?: None  Sore throat?: When first started tender left lymphadenopathy, improved  Loss of taste smell?: Yes  Received flu vaccine?: Yes  Received COVID vaccine?: Pfizer and Moderna x6 total, had COVID in March 2023, COVID home test was negative      Review of Systems   Constitutional:  Positive for fatigue and fever (subjective). Negative for chills and diaphoresis.   HENT:  Positive for congestion, sinus pressure, sinus pain and sneezing. Negative for sore throat and trouble swallowing.    Eyes:  Positive for discharge and itching.   Respiratory:  Positive for cough (non-bloody sputum). Negative for shortness of breath.    Cardiovascular:  Negative for chest pain and palpitations.   Gastrointestinal:  Negative for abdominal pain, diarrhea, nausea and vomiting.   Skin:  Negative for rash and wound.   Neurological:  Negative for dizziness, light-headedness and headaches.       Objective:      Vitals:    01/08/24 0959   BP: 104/72   BP Location: Left arm   Patient Position: Sitting   BP Method: Medium (Manual)   Pulse: 105   Resp: 17   Temp: 98.2 °F (36.8 °C)   SpO2: 99%   Weight: 68.9 kg (151 lb 12.6 oz)   Height: 5' 5" (1.651 m)     Physical Exam  Vitals reviewed.   Constitutional:       General: She is not in acute distress.     Appearance: Normal appearance. She is not ill-appearing.   HENT:      Head: Normocephalic and " "atraumatic.   Eyes:      General:         Right eye: No discharge.         Left eye: No discharge.      Conjunctiva/sclera: Conjunctivae normal.   Cardiovascular:      Rate and Rhythm: Normal rate and regular rhythm.      Pulses: Normal pulses.      Heart sounds: No murmur heard.  Pulmonary:      Effort: Pulmonary effort is normal.      Breath sounds: Normal breath sounds.   Abdominal:      Palpations: Abdomen is soft.      Tenderness: There is no abdominal tenderness.   Musculoskeletal:         General: No deformity.   Skin:     General: Skin is warm and dry.      Coloration: Skin is not jaundiced.   Neurological:      General: No focal deficit present.      Mental Status: She is alert and oriented to person, place, and time.   Psychiatric:         Mood and Affect: Mood normal.         Behavior: Behavior normal.             Lab Results   Component Value Date     10/26/2023    K 4.0 10/26/2023     10/26/2023    CO2 23 10/26/2023    BUN 9 10/26/2023    CREATININE 0.7 10/26/2023    ANIONGAP 10 10/26/2023     Lab Results   Component Value Date    HGBA1C 4.8 07/11/2023     No results found for: "BNP", "BNPTRIAGEBLO"    Lab Results   Component Value Date    WBC 5.90 10/26/2023    HGB 12.8 10/26/2023    HCT 40.4 10/26/2023     10/26/2023    GRAN 3.4 10/26/2023     Lab Results   Component Value Date    CHOL 174 08/12/2015    HDL 59 08/12/2015    LDLCALC 89.0 08/12/2015    TRIG 130 08/12/2015          Current Outpatient Medications:     acetaminophen (TYLENOL) 325 MG tablet, Take 650 mg by mouth., Disp: , Rfl:     atovaquone-proguaniL (MALARONE) 250-100 mg Tab, Take one tablet daily for malaria prevention. Begin one day before entering malarious area and continue for 1 week after return., Disp: 24 tablet, Rfl: 0    buPROPion (WELLBUTRIN XL) 150 MG TB24 tablet, TAKE 1 TABLET BY MOUTH DAILY FIRST 3 DAYS, WHEN STARTING MEDICATION, THEN 2 TABLETS DAILY THEREAFTER., Disp: 60 tablet, Rfl: 5    busPIRone " (BUSPAR) 10 MG tablet, Take 1/2 tablet (5 mg) three times daily for first week, then 1 tablet twice daily thereafter, Disp: 60 tablet, Rfl: 2    docusate sodium (COLACE) 100 MG capsule, Take 100 mg by mouth., Disp: , Rfl:     doxycycline (VIBRAMYCIN) 100 MG Cap, Take 1 capsule (100 mg total) by mouth every 12 (twelve) hours. for 10 days, Disp: 20 capsule, Rfl: 0    erythromycin (ROMYCIN) ophthalmic ointment, Place into both eyes 3 (three) times daily., Disp: 3.5 g, Rfl: 0    hydrocortisone (ANUSOL-HC) 2.5 % rectal cream, Place rectally 2 (two) times daily., Disp: 28 g, Rfl: 2    ibuprofen (ADVIL,MOTRIN) 200 MG tablet, Take 400 mg by mouth., Disp: , Rfl:     levonorgestreL (MIRENA) 21 mcg/24 hours (8 yrs) 52 mg IUD, by Intrauterine route., Disp: , Rfl:     loratadine-pseudoephedrine 5-120 mg (CLARITIN-D 12-HOUR) 5-120 mg per tablet, Take by mouth every 12 (twelve) hours., Disp: , Rfl:     methocarbamoL (ROBAXIN) 500 MG Tab, Take by mouth., Disp: , Rfl:     ondansetron (ZOFRAN-ODT) 8 MG TbDL, Take 1 tablet (8 mg total) by mouth every 8 (eight) hours as needed (nausea)., Disp: 30 tablet, Rfl: 0    sod sulf-pot chloride-mag sulf (SUTAB) 1.479-0.188- 0.225 gram tablet, Take 12 tablets by mouth once daily. Take according to package instructions with indicated amount of water. BIN: [id number]  PCN: ANA  GROUP: [id number] MEMBER ID: [id number], Disp: 24 tablet, Rfl: 0    traMADoL (ULTRAM) 50 mg tablet, Take 1 tablet (50 mg total) by mouth every 8 (eight) hours as needed for Pain., Disp: 20 tablet, Rfl: 0    benzonatate (TESSALON) 200 MG capsule, Take 1 capsule (200 mg total) by mouth 3 (three) times daily as needed for Cough., Disp: 30 capsule, Rfl: 0    guaiFENesin-codeine 100-10 mg/5 ml (TUSSI-ORGANIDIN NR)  mg/5 mL syrup, Take 5 mLs by mouth every 6 (six) hours as needed for Cough. Do not drive or perform dangerous tasks while taking, Disp: 120 mL, Rfl: 0        Assessment:       1. Conjunctivitis of both eyes,  unspecified conjunctivitis type    2. Acute URI           Plan:       Conjunctivitis of both eyes, unspecified conjunctivitis type  -     erythromycin (ROMYCIN) ophthalmic ointment; Place into both eyes 3 (three) times daily.  Dispense: 3.5 g; Refill: 0    Acute URI  -     doxycycline (VIBRAMYCIN) 100 MG Cap; Take 1 capsule (100 mg total) by mouth every 12 (twelve) hours. for 10 days  Dispense: 20 capsule; Refill: 0  -     benzonatate (TESSALON) 200 MG capsule; Take 1 capsule (200 mg total) by mouth 3 (three) times daily as needed for Cough.  Dispense: 30 capsule; Refill: 0  -     guaiFENesin-codeine 100-10 mg/5 ml (TUSSI-ORGANIDIN NR)  mg/5 mL syrup; Take 5 mLs by mouth every 6 (six) hours as needed for Cough. Do not drive or perform dangerous tasks while taking  Dispense: 120 mL; Refill: 0  - Present to ED if severely fatigued or respiratory distress develops     RTC PRN  Glenn Vega MD

## 2024-01-19 ENCOUNTER — HOSPITAL ENCOUNTER (OUTPATIENT)
Dept: RADIOLOGY | Facility: HOSPITAL | Age: 39
Discharge: HOME | End: 2024-01-19
Attending: INTERNAL MEDICINE
Payer: COMMERCIAL

## 2024-01-19 ENCOUNTER — PATIENT MESSAGE (OUTPATIENT)
Dept: HEMATOLOGY/ONCOLOGY | Facility: CLINIC | Age: 39
End: 2024-01-19
Payer: COMMERCIAL

## 2024-01-19 DIAGNOSIS — C18.7 MALIGNANT NEOPLASM OF SIGMOID COLON: ICD-10-CM

## 2024-01-19 PROCEDURE — 74177 CT ABD & PELVIS W/CONTRAST: CPT | Mod: TC

## 2024-01-19 PROCEDURE — A9698 NON-RAD CONTRAST MATERIALNOC: HCPCS | Performed by: INTERNAL MEDICINE

## 2024-01-19 PROCEDURE — 74177 CT ABD & PELVIS W/CONTRAST: CPT | Mod: 26,,, | Performed by: RADIOLOGY

## 2024-01-19 PROCEDURE — 25500020 PHARM REV CODE 255: Performed by: INTERNAL MEDICINE

## 2024-01-19 PROCEDURE — 71260 CT THORAX DX C+: CPT | Mod: 26,,, | Performed by: RADIOLOGY

## 2024-01-19 RX ADMIN — BARIUM SULFATE 450 ML: 20 SUSPENSION ORAL at 05:01

## 2024-01-19 RX ADMIN — IOHEXOL 100 ML: 350 INJECTION, SOLUTION INTRAVENOUS at 06:01

## 2024-01-26 ENCOUNTER — OFFICE VISIT (OUTPATIENT)
Dept: HEMATOLOGY/ONCOLOGY | Facility: CLINIC | Age: 39
End: 2024-01-26
Payer: COMMERCIAL

## 2024-01-26 VITALS
SYSTOLIC BLOOD PRESSURE: 115 MMHG | RESPIRATION RATE: 18 BRPM | HEART RATE: 79 BPM | WEIGHT: 147.25 LBS | HEIGHT: 65 IN | DIASTOLIC BLOOD PRESSURE: 57 MMHG | BODY MASS INDEX: 24.53 KG/M2 | OXYGEN SATURATION: 98 %

## 2024-01-26 DIAGNOSIS — D50.0 IRON DEFICIENCY ANEMIA DUE TO CHRONIC BLOOD LOSS: ICD-10-CM

## 2024-01-26 DIAGNOSIS — C18.7 MALIGNANT NEOPLASM OF SIGMOID COLON: Primary | ICD-10-CM

## 2024-01-26 DIAGNOSIS — M25.551 RIGHT HIP PAIN: ICD-10-CM

## 2024-01-26 DIAGNOSIS — E55.9 VITAMIN D DEFICIENCY: ICD-10-CM

## 2024-01-26 PROCEDURE — 3074F SYST BP LT 130 MM HG: CPT | Mod: CPTII,S$GLB,, | Performed by: INTERNAL MEDICINE

## 2024-01-26 PROCEDURE — 3078F DIAST BP <80 MM HG: CPT | Mod: CPTII,S$GLB,, | Performed by: INTERNAL MEDICINE

## 2024-01-26 PROCEDURE — 99214 OFFICE O/P EST MOD 30 MIN: CPT | Mod: S$GLB,,, | Performed by: INTERNAL MEDICINE

## 2024-01-26 PROCEDURE — 99999 PR PBB SHADOW E&M-EST. PATIENT-LVL IV: CPT | Mod: PBBFAC,,, | Performed by: INTERNAL MEDICINE

## 2024-01-26 PROCEDURE — 3008F BODY MASS INDEX DOCD: CPT | Mod: CPTII,S$GLB,, | Performed by: INTERNAL MEDICINE

## 2024-01-26 PROCEDURE — 1159F MED LIST DOCD IN RCRD: CPT | Mod: CPTII,S$GLB,, | Performed by: INTERNAL MEDICINE

## 2024-01-26 NOTE — PROGRESS NOTES
MEDICAL ONCOLOGY - ESTABLISHED PATIENT VISIT    Reason for visit: Colon cancer    Best Contact Phone Number(s): [phone] (home)      Cancer/Stage/TNM:    Cancer Staging   Malignant neoplasm of sigmoid colon  Staging form: Colon and Rectum, AJCC 8th Edition  - Pathologic stage from 7/20/2023: Stage IIA (pT3, pN0, cM0) - Signed by Surinder Youssef MD on 10/26/2023       Oncology History   Malignant neoplasm of sigmoid colon   7/6/2023 Initial Diagnosis    Malignant neoplasm of sigmoid colon     7/20/2023 Cancer Staged    Staging form: Colon and Rectum, AJCC 8th Edition  - Pathologic stage from 7/20/2023: Stage IIA (pT3, pN0, cM0)          Interim History:   38 y.o. female with thoracic outlet syndrome and sigmoid colon cancer s/p laparoscopic sigmoidectomy with Dr. Nesbitt on 7/15/23 who presents for follow-up while on surveillance.  She is feeling well. She has gained weight and is at her peak weight, around 150 lb.  She had some fecal urgency around the holidays in December but this has improved in the last few weeks.    Last Signatera ctDNA was 11/2/23 and was negative.    She is alone today.  ECOG PS 0.    ROS:   Review of Systems   Constitutional:  Negative for malaise/fatigue and weight loss.   HENT:  Negative for sore throat.    Eyes:  Negative for blurred vision and pain.   Respiratory:  Negative for cough and shortness of breath.    Cardiovascular:  Negative for chest pain and leg swelling.   Gastrointestinal:  Negative for abdominal pain, constipation, diarrhea, nausea and vomiting.   Genitourinary:  Negative for dysuria and frequency.   Musculoskeletal:  Positive for joint pain (R hip). Negative for back pain, falls and myalgias.   Skin:  Negative for rash.   Neurological:  Negative for weakness and headaches.   Endo/Heme/Allergies:  Does not bruise/bleed easily.   Psychiatric/Behavioral:  Negative for depression. The patient is not nervous/anxious.    All other systems reviewed and are  negative.      Past Medical History:   Past Medical History:   Diagnosis Date    Allergy     seasonal    Cervical rib     Vitamin D deficiency         Past Surgical History:   Past Surgical History:   Procedure Laterality Date    COLONOSCOPY N/A 6/30/2023    Procedure: COLONOSCOPY;  Surgeon: Andriy Vargas MD;  [facility];  Service: Endoscopy;  Laterality: N/A;  Sutab  Prep instructions given to pt in clinic -    COLONOSCOPY N/A 10/12/2023    Procedure: COLONOSCOPY;  Surgeon: Alda Partida MD;  Location: [address]);  Service: Endoscopy;  Laterality: N/A;  8/21- referred to Dr. Partida/ Prep instructions to portal. AS.pt request sooner appt with the first available with any provider. r/s 10.13.23 with  at [facility]  8/30-pt to be done with Dr. Partida-MS  10/5-precall complete-MS    WISDOM TOOTH EXTRACTION  2007        Family History:   Family History   Problem Relation Age of Onset    Breast cancer Maternal Grandmother 61    Hemophilia Father         Factor IX    Prostate cancer Father 72        [place] 6 or 7    Hyperlipidemia Father     Coronary artery disease Father     Heart disease Father     Thyroid disease Mother     Breast cancer Mother 72        triple negative, CHEK2 VUS    Asthma Mother     No Known Problems Sister     Prostate cancer Paternal Uncle 55    Prostate cancer Paternal Grandfather 70    COPD Paternal Grandfather     Heart disease Paternal Grandfather     Hemophilia Other     Breast cancer Other         dx @ 60s    Colon cancer Neg Hx     Ovarian cancer Neg Hx         Social History:   Social History     Tobacco Use    Smoking status: Former     Types: Vaping with nicotine    Smokeless tobacco: Never    Tobacco comments:     Stop smoking 2019   Substance Use Topics    Alcohol use: Yes     Alcohol/week: 5.0 standard drinks of alcohol     Types: 5 Glasses of wine per week     Comment: social        I have reviewed and updated the patient's past medical,  surgical, family and social histories.    Allergies:   Review of patient's allergies indicates:   Allergen Reactions    Cat/feline products Itching        Medications:   Current Outpatient Medications   Medication Sig Dispense Refill    acetaminophen (TYLENOL) 325 MG tablet Take 650 mg by mouth.      atovaquone-proguaniL (MALARONE) 250-100 mg Tab Take one tablet daily for malaria prevention. Begin one day before entering malarious area and continue for 1 week after return. 24 tablet 0    buPROPion (WELLBUTRIN XL) 150 MG TB24 tablet TAKE 1 TABLET BY MOUTH DAILY FIRST 3 DAYS, WHEN STARTING MEDICATION, THEN 2 TABLETS DAILY THEREAFTER. 60 tablet 5    busPIRone (BUSPAR) 10 MG tablet Take 1/2 tablet (5 mg) three times daily for first week, then 1 tablet twice daily thereafter 60 tablet 2    docusate sodium (COLACE) 100 MG capsule Take 100 mg by mouth.      erythromycin (ROMYCIN) ophthalmic ointment Place into both eyes 3 (three) times daily. 3.5 g 0    guaiFENesin-codeine 100-10 mg/5 ml (TUSSI-ORGANIDIN NR)  mg/5 mL syrup Take 5 mLs by mouth every 6 (six) hours as needed for Cough. Do not drive or perform dangerous tasks while taking 120 mL 0    hydrocortisone (ANUSOL-HC) 2.5 % rectal cream Place rectally 2 (two) times daily. 28 g 2    ibuprofen (ADVIL,MOTRIN) 200 MG tablet Take 400 mg by mouth.      levonorgestreL (MIRENA) 21 mcg/24 hours (8 yrs) 52 mg IUD by Intrauterine route.      loratadine-pseudoephedrine 5-120 mg (CLARITIN-D 12-HOUR) 5-120 mg per tablet Take by mouth every 12 (twelve) hours.      methocarbamoL (ROBAXIN) 500 MG Tab Take by mouth.      ondansetron (ZOFRAN-ODT) 8 MG TbDL Take 1 tablet (8 mg total) by mouth every 8 (eight) hours as needed (nausea). 30 tablet 0    sod sulf-pot chloride-mag sulf (SUTAB) 1.479-0.188- 0.225 gram tablet Take 12 tablets by mouth once daily. Take according to package instructions with indicated amount of water. BIN: [id number]  PCN: ANA  GROUP: [id number] MEMBER ID:  "[id number] 24 tablet 0    traMADoL (ULTRAM) 50 mg tablet Take 1 tablet (50 mg total) by mouth every 8 (eight) hours as needed for Pain. 20 tablet 0     No current facility-administered medications for this visit.        Physical Exam:   BP (!) 115/57 (BP Location: Left arm, Patient Position: Sitting, BP Method: Large (Automatic))   Pulse 79   Resp 18   Ht 5' 5" (1.651 m)   Wt 66.8 kg (147 lb 4.3 oz)   SpO2 98%   BMI 24.51 kg/m²      ECOG Performance status: 0            Physical Exam  Vitals reviewed.   Constitutional:       General: She is not in acute distress.     Appearance: Normal appearance. She is normal weight.   HENT:      Head: Normocephalic and atraumatic.      Right Ear: External ear normal.      Left Ear: External ear normal.      Nose: Nose normal.      Mouth/Throat:      Mouth: Mucous membranes are moist.      Pharynx: Oropharynx is clear. No posterior oropharyngeal erythema.   Eyes:      General: No scleral icterus.     Extraocular Movements: Extraocular movements intact.      Conjunctiva/sclera: Conjunctivae normal.      Pupils: Pupils are equal, round, and reactive to light.   Cardiovascular:      Rate and Rhythm: Normal rate and regular rhythm.      Pulses: Normal pulses.      Heart sounds: Normal heart sounds.   Pulmonary:      Effort: Pulmonary effort is normal.      Breath sounds: Normal breath sounds. No wheezing or rales.   Abdominal:      General: Bowel sounds are normal. There is no distension.      Palpations: Abdomen is soft.      Tenderness: There is no abdominal tenderness.   Musculoskeletal:         General: No swelling. Normal range of motion.      Cervical back: Normal range of motion and neck supple.   Skin:     General: Skin is warm.      Coloration: Skin is not jaundiced.      Findings: No erythema or rash.   Neurological:      General: No focal deficit present.      Mental Status: She is alert and oriented to person, place, and time. Mental status is at baseline.      " Gait: Gait normal.   Psychiatric:         Mood and Affect: Mood normal.         Behavior: Behavior normal.         Thought Content: Thought content normal.         Judgment: Judgment normal.           Labs:   No results found for this or any previous visit (from the past 48 hour(s)).     I have reviewed the pertinent labs from 1/19/24 which are notable for normalized hgb.  Normal LFTs and Cr.  CEA 1.7.    Imaging:       I have personally reviewed the 1/19/24 CT imaging which shows no clear evidence of disease recurrence.    Path:   Final Pathologic Diagnosis     1. Sigmoid colon, mass, biopsy:   Invasive adenocarcinoma.  VC      Comment: Interp By Herbert Beard M.D., Signed on 07/10/2023 at 10:18   Supplemental Diagnosis     Immunohistochemistry (IHC) Testing for Mismatch Repair (MMR) Proteins     MLH1   Intact nuclear expression     MSH2   Intact nuclear expression     MSH6   Intact nuclear expression     PMS2   Intact nuclear expression     Background nonneoplastic tissue/internal control with intact nuclear expression     IHC Interpretation   No loss of nuclear expression of MMR proteins: low probability of microsatellite instability-high (MSI-H)          7/15/23 Sigmoidectomy:     COLON AND RECTUM: Resection, Including Transanal Disk Excision of Rectal Neoplasms   COLON AND RECTUM: RESECTION - All Specimens   8th Edition - Protocol posted: 6/22/2022     SPECIMEN      Procedure:    Sigmoidectomy     TUMOR      Tumor Site:    Sigmoid colon      Histologic Type:    Adenocarcinoma      Histologic Grade:    G2, moderately differentiated      Tumor Size:    Greatest dimension (Centimeters): 5.5 cm      Tumor Extent:    Invades through muscularis propria into the pericolonic or perirectal tissue      Macroscopic Tumor Perforation:    Not identified      Lymphovascular Invasion:    Not identified      Perineural Invasion:    Not identified      Tumor Bud Score:    Low (0-4)      Treatment Effect:    No known  presurgical therapy     MARGINS      Margin Status for Invasive Carcinoma:    All margins negative for invasive carcinoma        Closest Margin(s) to Invasive Carcinoma:    Proximal        Distance from Invasive Carcinoma to Closest Margin:    2.8 cm      Margin Status for Non-Invasive Tumor:    All margins negative for high-grade dysplasia / intramucosal carcinoma and low-grade dysplasia     REGIONAL LYMPH NODES      Regional Lymph Node Status:            :    All regional lymph nodes negative for tumor        Number of Lymph Nodes Examined:    55      Tumor Deposits:    Not identified     PATHOLOGIC STAGE CLASSIFICATION (pTNM, AJCC 8th Edition)      Reporting of pT, pN, and (when applicable) pM categories is based on information available to the pathologist at the time the report is issued. As per the AJCC (Chapter 1, 8th Ed.) it is the managing physician's responsibility to establish the final pathologic stage based upon all pertinent information, including but potentially not limited to this pathology report.      pT Category:    pT3      pN Category:    pN0            Assessment:       1. Malignant neoplasm of sigmoid colon    2. Right hip pain    3. Vitamin D deficiency    4. Iron deficiency anemia due to chronic blood loss                Plan:             # Sigmoid colon cancer  She presented with several months of symptoms and was found to have a sigmoid colon adenocarcinoma, pMMR on 6/30/23 colonoscopy.  She had no clear evidence of distant metastatic disease on her pre-operative CT imaging. Saw Dr. Partida here and then went to [facility] for surgical consultation.  MRI there showed T4a N1 disease.  Underwent laparoscopic sigmoidectomy with Dr. Nesbitt on 7/15/23 without need for ostomy.  Regained bowel function and returned to [facility] 7/18/23.    Pathology from sigmoidectomy showed pT3N0 tumor with 55 negative lymph nodes, no LVI or PNI, negative margins.  Low tumor budding score, no perforation.    I  "discussed with her that the data are mixed on "low risk" stage II colon adenocarcinoma, pMMR, but I generally recommend active surveillance alone.  Another reasonable approach is six months of single agent capecitabine or 5-FU/LV.  There is not a survival benefit from the addition of oxaliplatin in this setting as per the MOSAIC study.  She is considering her options.    Completion colonoscopy 10/11/23 with Dr. Partida - 2 polyps removed.  Next in 10/2024.    She underwent ctDNA testing with Signatera on 8/1/23 with mobile phlebotomy.  This was negative.    Repeat Signatera was negative 11/2/23.  Repeat in February.    Plan to return in 6 months with CBC, CMP, CEA & CT CAP    # R hip pain  Chronic, intermittent.  (+) for HLA B27.  Saw Dr. West who did not feel that her presentation was not consistent with ankylosing spondylitis.  He reviewed her prior imaging.    # Vit D Deficiency  She is off vitamin D.  Had some prior osteopenia that improved.    # Iron deficiency anemia  Resolved.  Secondary to prior cancer.  Received IV Venofer.    Follow up: 6 months.    The above information has been reviewed with the patient and all questions have been answered to their apparent satisfaction.  They understand that they can call the clinic with any questions.      Surinder Youssef MD  Hematology/Oncology  [facility]      Route Chart for Scheduling    Med Onc Chart Routing      Follow up with physician 6 months.   Follow up with MIN    Infusion scheduling note    Injection scheduling note    Labs CBC, CMP and CEA   Scheduling:  Preferred lab:  Lab interval:     Imaging CT chest abdomen pelvis   prior to f/u in 6 months   Pharmacy appointment    Other referrals                    Therapy Plan Information  Medications  iron sucrose (VENOFER) 300 mg in sodium chloride 0.9% 250 mL IVPB  300 mg, Intravenous, 1 time a week  Anaphylaxis/Hypersensitivity  EPINEPHrine (EPIPEN) 0.3 mg/0.3 mL pen injection 0.3 " mg  0.3 mg, Intramuscular, PRN  diphenhydrAMINE injection 50 mg  50 mg, Intravenous, PRN  hydrocortisone sodium succinate injection 100 mg  100 mg, Intravenous, PRN  Flushes  sodium chloride 0.9% 250 mL flush bag  Intravenous, 1 time a week  sodium chloride 0.9% flush 10 mL  10 mL, Intravenous, 1 time a week  heparin, porcine (PF) 100 unit/mL injection flush 500 Units  500 Units, Intravenous, 1 time a week  alteplase injection 2 mg  2 mg, Intra-Catheter, 1 time a week

## 2024-02-09 ENCOUNTER — PATIENT MESSAGE (OUTPATIENT)
Dept: HEMATOLOGY/ONCOLOGY | Facility: CLINIC | Age: 39
End: 2024-02-09
Payer: COMMERCIAL

## 2024-03-21 ENCOUNTER — PATIENT MESSAGE (OUTPATIENT)
Dept: HEMATOLOGY/ONCOLOGY | Facility: CLINIC | Age: 39
End: 2024-03-21
Payer: COMMERCIAL

## 2024-03-22 ENCOUNTER — TELEPHONE (OUTPATIENT)
Dept: SURGERY | Facility: CLINIC | Age: 39
End: 2024-03-22
Payer: COMMERCIAL

## 2024-03-22 NOTE — TELEPHONE ENCOUNTER
Placed call to schedule appt w/ Dr. Partida d/t rectal bleeding.     Pt was scheduled for CRS appt & lab work according to her availability.  Verbalized approval during the call. Location details discussed.    No additional needs identified at this time.

## 2024-04-10 ENCOUNTER — PATIENT MESSAGE (OUTPATIENT)
Dept: SURGERY | Facility: CLINIC | Age: 39
End: 2024-04-10

## 2024-04-10 ENCOUNTER — OFFICE VISIT (OUTPATIENT)
Dept: SURGERY | Facility: CLINIC | Age: 39
End: 2024-04-10
Attending: COLON & RECTAL SURGERY
Payer: COMMERCIAL

## 2024-04-10 DIAGNOSIS — C18.7 MALIGNANT NEOPLASM OF SIGMOID COLON: ICD-10-CM

## 2024-04-10 DIAGNOSIS — K92.1 BLOOD IN STOOL: Primary | ICD-10-CM

## 2024-04-10 PROCEDURE — 99213 OFFICE O/P EST LOW 20 MIN: CPT | Mod: 95,,, | Performed by: COLON & RECTAL SURGERY

## 2024-04-10 PROCEDURE — 1159F MED LIST DOCD IN RCRD: CPT | Mod: CPTII,95,, | Performed by: COLON & RECTAL SURGERY

## 2024-04-10 PROCEDURE — 1160F RVW MEDS BY RX/DR IN RCRD: CPT | Mod: CPTII,95,, | Performed by: COLON & RECTAL SURGERY

## 2024-04-10 NOTE — PROGRESS NOTES
CRS Office Visit History and Physical  The patient location is: Home  The chief complaint leading to consultation is: Rectal bleeding    Visit type: audiovisual    Face to Face time with patient: 15  30 minutes of total time spent on the encounter, which includes face to face time and non-face to face time preparing to see the patient (eg, review of tests), Obtaining and/or reviewing separately obtained history, Documenting clinical information in the electronic or other health record, Independently interpreting results (not separately reported) and communicating results to the patient/family/caregiver, or Care coordination (not separately reported).     Each patient to whom he or she provides medical services by telemedicine is:  (1) informed of the relationship between the physician and patient and the respective role of any other health care provider with respect to management of the patient; and (2) notified that he or she may decline to receive medical services by telemedicine and may withdraw from such care at any time.      SUBJECTIVE:     Chief Complaint: Colon cancer history, rectal bleeding    History of Present Illness:  Patient is a 38 y.o. female presents with rectal bleeding.  Had some rectal bleeding. Lasted over a few days, filled the toilet bowel. No pain, no constipation/diarrhea prior to that event. Blood was bright red. Has not had any further bleeding in the last 2 weeks.  Last scope was 10/12/2023.  Had robotic sigmoid resection 7/15/2023 ([facility]). Stage was IIA (pT3N0). Follows with Dr Youssef. Last Signatera Nov 2023 was negative.  Has had hemorrhoid bleeding in the past. This seemed like more blood than what she is used to.      Review of patient's allergies indicates:   Allergen Reactions    Cat/feline products Itching       Past Medical History:   Diagnosis Date    Allergy     seasonal    Cervical rib     Vitamin D deficiency      Past Surgical History:   Procedure Laterality Date     COLONOSCOPY N/A 6/30/2023    Procedure: COLONOSCOPY;  Surgeon: Andriy Vargas MD;  Location: [facility] ENDOSCOPY;  Service: Endoscopy;  Laterality: N/A;  Sutab  Prep instructions given to pt in clinic -    COLONOSCOPY N/A 10/12/2023    Procedure: COLONOSCOPY;  Surgeon: Alda Partida MD;  Location: [facility] ([address] [place] [address]);  Service: Endoscopy;  Laterality: N/A;  8/21- referred to Dr. Partida/ Prep instructions to portal. AS.pt request sooner appt with the first available with any provider. r/s 10.13.23 with  at [facility]  8/30-pt to be done with Dr. Partida-MS  10/5-precall complete-MS    WISDOM TOOTH EXTRACTION  2007     Family History   Problem Relation Age of Onset    Breast cancer Maternal Grandmother 61    Hemophilia Father         Factor IX    Prostate cancer Father 72        [place] 6 or 7    Hyperlipidemia Father     Coronary artery disease Father     Heart disease Father     Thyroid disease Mother     Breast cancer Mother 72        triple negative, CHEK2 VUS    Asthma Mother     No Known Problems Sister     Prostate cancer Paternal Uncle 55    Prostate cancer Paternal Grandfather 70    COPD Paternal Grandfather     Heart disease Paternal Grandfather     Hemophilia Other     Breast cancer Other         dx @ 60s    Colon cancer Neg Hx     Ovarian cancer Neg Hx      Social History     Tobacco Use    Smoking status: Former     Types: Vaping with nicotine    Smokeless tobacco: Never    Tobacco comments:     Stop smoking 2019   Substance Use Topics    Alcohol use: Yes     Alcohol/week: 5.0 standard drinks of alcohol     Types: 5 Glasses of wine per week     Comment: social    Drug use: No        Review of Systems:  ROS    OBJECTIVE:       Physical Exam:  General: White female in no distress   Neuro: Alert and oriented x 4.           Labs:  Lab Results   Component Value Date    WBC 4.44 04/04/2024    HGB 13.2 04/04/2024    HCT 40.1 04/04/2024    MCV 90 04/04/2024     04/04/2024     Imaging:  CT c/a/p  (1/19/2024)  Status post sigmoid colon resection and anastomosis without convincing evidence of metastatic disease in the chest, abdomen, or pelvis.  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    ** I have reviewed these images **    ASSESSMENT/PLAN:     36yo F w/ history of stage II colon cancer, s/p sigmoid colectomy in July 2023, normal colonoscopy Oct 2023, presenting with rectal bleeding    Recommended office flexible sigmoidoscopy to evaluate anastomosis.  Appointment scheduled for Friday.      Alda Partida MD  Staff Surgeon, Colon and Rectal Surgery  [facility]

## 2024-04-11 NOTE — PROGRESS NOTES
CRS Office Visit History and Physical    SUBJECTIVE:     Chief Complaint: Colon cancer history, rectal bleeding    History of Present Illness:  Patient is a 38 y.o. female presents with rectal bleeding.  Had some rectal bleeding. Lasted over a few days, filled the toilet bowel. No pain, no constipation/diarrhea prior to that event. Blood was bright red. Has not had any further bleeding in the last 2 weeks.  Last scope was 10/12/2023.  Had robotic sigmoid resection 7/15/2023 ([facility]). Stage was IIA (pT3N0). Follows with Dr Youssef. Last Signatera Nov 2023 was negative.  Has had hemorrhoid bleeding in the past. This seemed like more blood than what she is used to.      Review of patient's allergies indicates:   Allergen Reactions    Cat/feline products Itching       Past Medical History:   Diagnosis Date    Allergy     seasonal    Cervical rib     Vitamin D deficiency      Past Surgical History:   Procedure Laterality Date    COLONOSCOPY N/A 6/30/2023    Procedure: COLONOSCOPY;  Surgeon: Andriy Vargas MD;  Location: [facility] ENDOSCOPY;  Service: Endoscopy;  Laterality: N/A;  Sutab  Prep instructions given to pt in clinic -    COLONOSCOPY N/A 10/12/2023    Procedure: COLONOSCOPY;  Surgeon: Alda Partida MD;  Location: [facility] ([address] [place] [address]);  Service: Endoscopy;  Laterality: N/A;  8/21- referred to Dr. Partida/ Prep instructions to portal. AS.pt request sooner appt with the first available with any provider. r/s 10.13.23 with  at [facility]  8/30-pt to be done with Dr. Partida-MS  10/5-precall complete-MS    WISDOM TOOTH EXTRACTION  2007     Family History   Problem Relation Age of Onset    Breast cancer Maternal Grandmother 61    Hemophilia Father         Factor IX    Prostate cancer Father 72        [place] 6 or 7    Hyperlipidemia Father     Coronary artery disease Father     Heart disease Father     Thyroid disease Mother     Breast cancer Mother 72        triple negative, CHEK2 VUS    Asthma Mother      "No Known Problems Sister     Prostate cancer Paternal Uncle 55    Prostate cancer Paternal Grandfather 70    COPD Paternal Grandfather     Heart disease Paternal Grandfather     Hemophilia Other     Breast cancer Other         dx @ 60s    Colon cancer Neg Hx     Ovarian cancer Neg Hx      Social History     Tobacco Use    Smoking status: Former     Types: Vaping with nicotine    Smokeless tobacco: Never    Tobacco comments:     Stop smoking 2019   Substance Use Topics    Alcohol use: Yes     Alcohol/week: 5.0 standard drinks of alcohol     Types: 5 Glasses of wine per week     Comment: social    Drug use: No        Review of Systems:  ROS    OBJECTIVE:     /65 (BP Location: Left arm, Patient Position: Sitting, BP Method: Medium (Automatic))   Pulse 79   Ht 5' 5" (1.651 m)   Wt 66.4 kg (146 lb 6.2 oz)   BMI 24.36 kg/m²     Physical Exam:  General: White female in no distress   Neuro: Alert and oriented x 4.           Labs:  Lab Results   Component Value Date    WBC 4.44 04/04/2024    HGB 13.2 04/04/2024    HCT 40.1 04/04/2024    MCV 90 04/04/2024     04/04/2024     Imaging:  CT c/a/p (1/19/2024)  Status post sigmoid colon resection and anastomosis without convincing evidence of metastatic disease in the chest, abdomen, or pelvis.  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    ** I have reviewed these images **    Flexible sigmoidoscopy performed today in clinic- see Provation for report.  Anastomosis and rectal mucosa healthy.    ASSESSMENT/PLAN:     38yo F w/ history of stage II colon cancer, s/p sigmoid colectomy in July 2023, normal colonoscopy Oct 2023, with rectal bleeding, flex sig normal today.  Discussed that this is likely hemorrhoidal bleeding.  Discussed recommendations: increasing fiber intake to 25-30 grams/day, avoiding spending >3min at a time on the toilet, no straining.        Alda Partida MD  Staff Surgeon, Colon and Rectal " Surgery  [facility]

## 2024-04-12 ENCOUNTER — OFFICE VISIT (OUTPATIENT)
Dept: SURGERY | Facility: CLINIC | Age: 39
End: 2024-04-12
Attending: COLON & RECTAL SURGERY
Payer: COMMERCIAL

## 2024-04-12 VITALS
SYSTOLIC BLOOD PRESSURE: 116 MMHG | DIASTOLIC BLOOD PRESSURE: 65 MMHG | HEIGHT: 65 IN | HEART RATE: 79 BPM | WEIGHT: 146.38 LBS | BODY MASS INDEX: 24.39 KG/M2

## 2024-04-12 DIAGNOSIS — K92.1 BLOOD IN STOOL: Primary | ICD-10-CM

## 2024-04-12 PROCEDURE — 99999 PR PBB SHADOW E&M-EST. PATIENT-LVL III: CPT | Mod: PBBFAC,,, | Performed by: COLON & RECTAL SURGERY

## 2024-04-12 PROCEDURE — 1159F MED LIST DOCD IN RCRD: CPT | Mod: CPTII,S$GLB,, | Performed by: COLON & RECTAL SURGERY

## 2024-04-12 PROCEDURE — 3074F SYST BP LT 130 MM HG: CPT | Mod: CPTII,S$GLB,, | Performed by: COLON & RECTAL SURGERY

## 2024-04-12 PROCEDURE — 1160F RVW MEDS BY RX/DR IN RCRD: CPT | Mod: CPTII,S$GLB,, | Performed by: COLON & RECTAL SURGERY

## 2024-04-12 PROCEDURE — 45330 DIAGNOSTIC SIGMOIDOSCOPY: CPT | Mod: S$GLB,,, | Performed by: COLON & RECTAL SURGERY

## 2024-04-12 PROCEDURE — 99213 OFFICE O/P EST LOW 20 MIN: CPT | Mod: 25,S$GLB,, | Performed by: COLON & RECTAL SURGERY

## 2024-04-12 PROCEDURE — 3078F DIAST BP <80 MM HG: CPT | Mod: CPTII,S$GLB,, | Performed by: COLON & RECTAL SURGERY

## 2024-04-12 PROCEDURE — 3008F BODY MASS INDEX DOCD: CPT | Mod: CPTII,S$GLB,, | Performed by: COLON & RECTAL SURGERY

## 2024-04-15 DIAGNOSIS — C18.7 MALIGNANT NEOPLASM OF SIGMOID COLON: Primary | ICD-10-CM

## 2024-04-15 NOTE — PROVATION PATIENT INSTRUCTIONS
Discharge Summary/Instructions after an Endoscopic Procedure  Patient Name: Flora Edmond  Patient MRN: [id number]  Patient YOB: 1985 Friday, April 12, 2024  Alda Partida MD  Dear patient,  As a result of recent federal legislation (The Federal Cures Act), you may   receive lab or pathology results from your procedure in your [facility]   account before your physician is able to contact you. Your physician or   their representative will relay the results to you with their   recommendations at their soonest availability.  Thank you,  RESTRICTIONS:  During your procedure today, you received medications for sedation.  These   medications may affect your judgment, balance and coordination.  Therefore,   for 24 hours, you have the following restrictions:   - DO NOT drive a car, operate machinery, make legal/financial decisions,   sign important papers or drink alcohol.    ACTIVITY:  Today: no heavy lifting, straining or running due to procedural   sedation/anesthesia.  The following day: return to full activity including work.  DIET:  Eat and drink normally unless instructed otherwise.     TREATMENT FOR COMMON SIDE EFFECTS:  - Mild abdominal pain, nausea, belching, bloating or excessive gas:  rest,   eat lightly and use a heating pad.  - Sore Throat: treat with throat lozenges and/or gargle with warm salt   water.  - Because air was used during the procedure, expelling large amounts of air   from your rectum or belching is normal.  - If a bowel prep was taken, you may not have a bowel movement for 1-3 days.    This is normal.  SYMPTOMS TO WATCH FOR AND REPORT TO YOUR PHYSICIAN:  1. Abdominal pain or bloating, other than gas cramps.  2. Chest pain.  3. Back pain.  4. Signs of infection such as: chills or fever occurring within 24 hours   after the procedure.  5. Rectal bleeding, which would show as bright red, maroon, or black stools.   (A tablespoon of blood from the rectum is not serious, especially  if   hemorrhoids are present.)  6. Vomiting.  7. Weakness or dizziness.  GO DIRECTLY TO THE NEAREST EMERGENCY ROOM IF YOU HAVE ANY OF THE FOLLOWING:      Difficulty breathing              Chills and/or fever over 101 F   Persistent vomiting and/or vomiting blood   Severe abdominal pain   Severe chest pain   Black, tarry stools   Bleeding- more than one tablespoon   Any other symptom or condition that you feel may need urgent attention  Your doctor recommends these additional instructions:  If any biopsies were taken, your doctors clinic will contact you in 1 to 2   weeks with any results.  - Discharge patient to home.   - Resume previous diet.  For questions, problems or results please call your physician - Alda Partida MD at Work:  [phone].  [facility] [place], EMERGENCY ROOM PHONE NUMBER: [phone]  IF A COMPLICATION OR EMERGENCY SITUATION ARISES AND YOU ARE UNABLE TO REACH   YOUR PHYSICIAN - GO DIRECTLY TO THE EMERGENCY ROOM.  Alda Partida MD  4/15/2024 10:43:08 AM  This report has been verified and signed electronically.  Dear patient,  As a result of recent federal legislation (The Federal Cures Act), you may   receive lab or pathology results from your procedure in your [facility]   account before your physician is able to contact you. Your physician or   their representative will relay the results to you with their   recommendations at their soonest availability.  Thank you,  PROVATION

## 2024-04-16 DIAGNOSIS — C18.7 MALIGNANT NEOPLASM OF SIGMOID COLON: Primary | ICD-10-CM

## 2024-04-30 ENCOUNTER — PATIENT MESSAGE (OUTPATIENT)
Dept: HEMATOLOGY/ONCOLOGY | Facility: CLINIC | Age: 39
End: 2024-04-30
Payer: COMMERCIAL

## 2024-05-06 ENCOUNTER — TELEPHONE (OUTPATIENT)
Dept: HEMATOLOGY/ONCOLOGY | Facility: CLINIC | Age: 39
End: 2024-05-06
Payer: COMMERCIAL

## 2024-06-16 ENCOUNTER — PATIENT MESSAGE (OUTPATIENT)
Dept: PRIMARY CARE CLINIC | Facility: CLINIC | Age: 39
End: 2024-06-16
Payer: COMMERCIAL

## 2024-06-24 ENCOUNTER — OFFICE VISIT (OUTPATIENT)
Dept: DERMATOLOGY | Facility: CLINIC | Age: 39
End: 2024-06-24
Payer: COMMERCIAL

## 2024-06-24 VITALS — WEIGHT: 146 LBS | BODY MASS INDEX: 24.3 KG/M2

## 2024-06-24 DIAGNOSIS — D23.9 DERMATOFIBROMA: ICD-10-CM

## 2024-06-24 DIAGNOSIS — D22.9 NEVUS OF MULTIPLE SITES: ICD-10-CM

## 2024-06-24 DIAGNOSIS — L81.4 LENTIGINES: ICD-10-CM

## 2024-06-24 DIAGNOSIS — L85.8 KERATOSIS PILARIS: Primary | ICD-10-CM

## 2024-06-24 DIAGNOSIS — Z12.83 SKIN EXAM, SCREENING FOR CANCER: ICD-10-CM

## 2024-06-24 PROCEDURE — 3008F BODY MASS INDEX DOCD: CPT | Mod: CPTII,S$GLB,, | Performed by: DERMATOLOGY

## 2024-06-24 PROCEDURE — 99214 OFFICE O/P EST MOD 30 MIN: CPT | Mod: S$GLB,,, | Performed by: DERMATOLOGY

## 2024-06-24 PROCEDURE — 99999 PR PBB SHADOW E&M-EST. PATIENT-LVL III: CPT | Mod: PBBFAC,,, | Performed by: DERMATOLOGY

## 2024-06-24 PROCEDURE — 1159F MED LIST DOCD IN RCRD: CPT | Mod: CPTII,S$GLB,, | Performed by: DERMATOLOGY

## 2024-06-24 PROCEDURE — 1160F RVW MEDS BY RX/DR IN RCRD: CPT | Mod: CPTII,S$GLB,, | Performed by: DERMATOLOGY

## 2024-06-24 RX ORDER — PIMECROLIMUS 10 MG/G
CREAM TOPICAL
Qty: 60 G | Refills: 3 | Status: SHIPPED | OUTPATIENT
Start: 2024-06-24

## 2024-06-24 NOTE — PROGRESS NOTES
Subjective:      Patient ID:  Flora Edmond is a 38 y.o. female who presents for   Chief Complaint   Patient presents with    Skin Check     Would like skin check, new spot on right shoulder which has grown some.  The nevus removed from her right thigh was benign.   Also would like tx for her KP when it flares,.        Review of Systems   Constitutional:  Negative for fever, chills, weight loss, weight gain, fatigue, night sweats and malaise.   Skin:  Positive for daily sunscreen use and activity-related sunscreen use. Negative for wears hat.   Hematologic/Lymphatic: Does not bruise/bleed easily.       Objective:   Physical Exam   Constitutional: She appears well-developed and well-nourished. No distress.   Neurological: She is alert and oriented to person, place, and time. She is not disoriented.   Psychiatric: She has a normal mood and affect.   Skin:   Areas Examined (abnormalities noted in diagram):   Head / Face Inspection Performed  Neck Inspection Performed  Chest / Axilla Inspection Performed  Abdomen Inspection Performed  Back Inspection Performed  RUE Inspected  LUE Inspection Performed  RLE Inspected  LLE Inspection Performed  Nails and Digits Inspection Performed                 Diagram Legend     Erythematous scaling macule/papule c/w actinic keratosis       Vascular papule c/w angioma      Pigmented verrucoid papule/plaque c/w seborrheic keratosis      Yellow umbilicated papule c/w sebaceous hyperplasia      Irregularly shaped tan macule c/w lentigo     1-2 mm smooth white papules consistent with Milia      Movable subcutaneous cyst with punctum c/w epidermal inclusion cyst      Subcutaneous movable cyst c/w pilar cyst      Firm pink to brown papule c/w dermatofibroma      Pedunculated fleshy papule(s) c/w skin tag(s)      Evenly pigmented macule c/w junctional nevus     Mildly variegated pigmented, slightly irregular-bordered macule c/w mildly atypical nevus      Flesh colored to evenly pigmented  "papule c/w intradermal nevus       Pink pearly papule/plaque c/w basal cell carcinoma      Erythematous hyperkeratotic cursted plaque c/w SCC      Surgical scar with no sign of skin cancer recurrence      Open and closed comedones      Inflammatory papules and pustules      Verrucoid papule consistent consistent with wart     Erythematous eczematous patches and plaques     Dystrophic onycholytic nail with subungual debris c/w onychomycosis     Umbilicated papule    Erythematous-base heme-crusted tan verrucoid plaque consistent with inflamed seborrheic keratosis     Erythematous Silvery Scaling Plaque c/w Psoriasis     See annotation      Assessment / Plan:        Keratosis pilaris  -     ELIDEL 1 % cream; Use bid prn rash  Dispense: 60 g; Refill: 3    Dermatofibroma  Brochure provided  Consider removal can potentially be painful    Nevus of multiple sites  The "ABCD" rules to observe pigmented lesions were reviewed.  Brochure provided      Lentigines  The "ABCD" rules to observe pigmented lesions were reviewed.      Skin exam, screening for cancer  . No lesions suspicious for malignancy noted.    Recommend daily sun protection/avoidance and use of at least SPF 30, broad spectrum sunscreen (OTC drug).                Follow up in about 1 year (around 6/24/2025). Or sooner for removal of DF  "

## 2024-07-12 ENCOUNTER — OFFICE VISIT (OUTPATIENT)
Dept: OPTOMETRY | Facility: CLINIC | Age: 39
End: 2024-07-12
Payer: COMMERCIAL

## 2024-07-12 ENCOUNTER — PATIENT MESSAGE (OUTPATIENT)
Dept: HEMATOLOGY/ONCOLOGY | Facility: CLINIC | Age: 39
End: 2024-07-12
Payer: COMMERCIAL

## 2024-07-12 DIAGNOSIS — Z15.89 HLA B27 (HLA B27 POSITIVE): ICD-10-CM

## 2024-07-12 DIAGNOSIS — H04.123 DRY EYE SYNDROME, BILATERAL: Primary | ICD-10-CM

## 2024-07-12 DIAGNOSIS — H52.13 MYOPIA, BILATERAL: ICD-10-CM

## 2024-07-12 DIAGNOSIS — H10.13 ALLERGIC CONJUNCTIVITIS OF BOTH EYES: ICD-10-CM

## 2024-07-12 PROCEDURE — 99999 PR PBB SHADOW E&M-EST. PATIENT-LVL III: CPT | Mod: PBBFAC,,, | Performed by: OPTOMETRIST

## 2024-07-12 RX ORDER — LOTEPREDNOL ETABONATE 5 MG/ML
1 SUSPENSION/ DROPS OPHTHALMIC 4 TIMES DAILY
Qty: 5 ML | Refills: 1 | Status: SHIPPED | OUTPATIENT
Start: 2024-07-12 | End: 2024-07-19

## 2024-07-12 NOTE — PROGRESS NOTES
HPI    KERI: 1yr    CC: Pt is here today for a routine eye exam. Pt states that over the last   few years she will have intermittent eye pain. It alternates between the   right and left eye. Pt states that yesterday she was having right eye pain   but it has improved today.     (-)Dryness  (-)Burning  (-)Itchiness  (-)Tearing  (-)Flashes  (-)Floaters   (-)Photophobia  (+)Eye Pain      Diabetic: no    Contact Lens: no    Eye Meds: none    Ocular History: none    PD: 62.0    Last edited by Lida Ruano MA on 7/12/2024 11:04 AM.            Assessment /Plan     For exam results, see Encounter Report.    Dry eye syndrome, bilateral   Start loteprednol (LOTEMAX) 0.5 % ophthalmic suspension; Place 1 drop into both eyes 4 (four) times daily. for 7 days  Dispense: 5 mL; Refill: 1  -   then switch to  perfluorohexyloctane, PF, 100 % Drop; Place 1 drop into both eyes 4 (four) times daily.  Dispense: 3 mL; Refill: 11   May need to switch to restasis or xiidra    Allergic conjunctivitis of both eyes   Use Pataday extra strength QAM    HLA B27 (HLA B27 positive)    Myopia, bilateral   Check rx at next visit       RTC 1 month for dry eye f/u and refraction

## 2024-07-17 ENCOUNTER — LAB VISIT (OUTPATIENT)
Dept: LAB | Facility: OTHER | Age: 39
End: 2024-07-17
Payer: COMMERCIAL

## 2024-07-17 ENCOUNTER — CLINICAL SUPPORT (OUTPATIENT)
Dept: OBSTETRICS AND GYNECOLOGY | Facility: CLINIC | Age: 39
End: 2024-07-17
Payer: COMMERCIAL

## 2024-07-17 ENCOUNTER — OFFICE VISIT (OUTPATIENT)
Dept: OBSTETRICS AND GYNECOLOGY | Facility: CLINIC | Age: 39
End: 2024-07-17
Payer: COMMERCIAL

## 2024-07-17 VITALS
HEIGHT: 65 IN | SYSTOLIC BLOOD PRESSURE: 122 MMHG | BODY MASS INDEX: 23.88 KG/M2 | WEIGHT: 143.31 LBS | DIASTOLIC BLOOD PRESSURE: 82 MMHG

## 2024-07-17 DIAGNOSIS — Z11.3 SCREEN FOR STD (SEXUALLY TRANSMITTED DISEASE): ICD-10-CM

## 2024-07-17 DIAGNOSIS — R35.0 URINARY FREQUENCY: ICD-10-CM

## 2024-07-17 DIAGNOSIS — Z72.89 OTHER PROBLEMS RELATED TO LIFESTYLE: ICD-10-CM

## 2024-07-17 DIAGNOSIS — Z23 NEED FOR HPV VACCINATION: ICD-10-CM

## 2024-07-17 DIAGNOSIS — Z01.419 WELL WOMAN EXAM WITH ROUTINE GYNECOLOGICAL EXAM: Primary | ICD-10-CM

## 2024-07-17 DIAGNOSIS — Z85.038 H/O COLON CANCER, STAGE II: ICD-10-CM

## 2024-07-17 DIAGNOSIS — Z86.19 HISTORY OF HPV INFECTION: ICD-10-CM

## 2024-07-17 DIAGNOSIS — Z12.4 CERVICAL CANCER SCREENING: ICD-10-CM

## 2024-07-17 LAB
BILIRUB SERPL-MCNC: NORMAL MG/DL
BLOOD URINE, POC: NORMAL
CLARITY, POC UA: CLEAR
COLOR, POC UA: YELLOW
GLUCOSE UR QL STRIP: NORMAL
HBV SURFACE AG SERPL QL IA: NORMAL
HCV AB SERPL QL IA: NEGATIVE
HIV 1+2 AB+HIV1 P24 AG SERPL QL IA: NEGATIVE
KETONES UR QL STRIP: NORMAL
LEUKOCYTE ESTERASE URINE, POC: NORMAL
NITRITE, POC UA: NORMAL
PH, POC UA: 5
PROTEIN, POC: NORMAL
SPECIFIC GRAVITY, POC UA: 1.02
TREPONEMA PALLIDUM IGG+IGM AB [PRESENCE] IN SERUM OR PLASMA BY IMMUNOASSAY: NONREACTIVE
UROBILINOGEN, POC UA: NORMAL

## 2024-07-17 PROCEDURE — 3074F SYST BP LT 130 MM HG: CPT | Mod: CPTII,S$GLB,,

## 2024-07-17 PROCEDURE — 86593 SYPHILIS TEST NON-TREP QUANT: CPT

## 2024-07-17 PROCEDURE — 90471 IMMUNIZATION ADMIN: CPT | Mod: S$GLB,,,

## 2024-07-17 PROCEDURE — 81002 URINALYSIS NONAUTO W/O SCOPE: CPT | Mod: S$GLB,,,

## 2024-07-17 PROCEDURE — 1159F MED LIST DOCD IN RCRD: CPT | Mod: CPTII,S$GLB,,

## 2024-07-17 PROCEDURE — 87389 HIV-1 AG W/HIV-1&-2 AB AG IA: CPT

## 2024-07-17 PROCEDURE — 87624 HPV HI-RISK TYP POOLED RSLT: CPT

## 2024-07-17 PROCEDURE — 99999 PR PBB SHADOW E&M-EST. PATIENT-LVL I: CPT | Mod: PBBFAC,,,

## 2024-07-17 PROCEDURE — 90651 9VHPV VACCINE 2/3 DOSE IM: CPT | Mod: S$GLB,,,

## 2024-07-17 PROCEDURE — 88175 CYTOPATH C/V AUTO FLUID REDO: CPT

## 2024-07-17 PROCEDURE — 3008F BODY MASS INDEX DOCD: CPT | Mod: CPTII,S$GLB,,

## 2024-07-17 PROCEDURE — 87340 HEPATITIS B SURFACE AG IA: CPT

## 2024-07-17 PROCEDURE — 3079F DIAST BP 80-89 MM HG: CPT | Mod: CPTII,S$GLB,,

## 2024-07-17 PROCEDURE — 36415 COLL VENOUS BLD VENIPUNCTURE: CPT

## 2024-07-17 PROCEDURE — 1160F RVW MEDS BY RX/DR IN RCRD: CPT | Mod: CPTII,S$GLB,,

## 2024-07-17 PROCEDURE — 87491 CHLMYD TRACH DNA AMP PROBE: CPT

## 2024-07-17 PROCEDURE — 86803 HEPATITIS C AB TEST: CPT

## 2024-07-17 PROCEDURE — 99999 PR PBB SHADOW E&M-EST. PATIENT-LVL III: CPT | Mod: PBBFAC,,,

## 2024-07-17 PROCEDURE — 99395 PREV VISIT EST AGE 18-39: CPT | Mod: S$GLB,,,

## 2024-07-17 NOTE — PROGRESS NOTES
Here for Gardasil # 1 OF 3 injection, without complaint at this time. Reports no pain prior to or post injection. Advised to wait in lobby 15 minutes post injection and report any adverse reactions.    Return to clinic in 8WEEKS for next injection.     Site: LD

## 2024-07-17 NOTE — PROGRESS NOTES
CC: Annual    HPI: Pt is a 38 y.o.  female who presents for routine annual exam. Has Mirena IUD- placed 2019. Does not get periods with it. She is sexually active with multiple partners, uses condoms.  She does want STD screening. Was dx with colon cancer last year- had resection, did not have to do chemo. 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 Does report urinary frequency for the past few years- has been tested for UTI with negative results.     FH:   Breast cancer: mother in 60s, maternal grandmother in 60s  Colon cancer: none  Ovarian cancer: none  Uterine cancer: none    HPV vaccine: x2    Last pap smear:  23- NILM, HPV neg   History of abnormal pap smears: no   STD history: HSV1, chlamydia, HPV   Birth control: Mirena IUD   OB history:   Tobacco use: none    ROS:  GENERAL: Feeling well overall. Denies fever or chills.   SKIN: Denies rash or lesions.   HEAD: Denies head injury or headache.   NODES: Denies enlarged lymph nodes.   CHEST: Denies chest pain or shortness of breath.   CARDIOVASCULAR: Denies palpitations or left sided chest pain.   ABDOMEN: No abdominal pain, constipation, diarrhea, nausea, vomiting or rectal bleeding.   URINARY: No dysuria, hematuria, or burning on urination.  REPRODUCTIVE: See HPI.   BREASTS: Denies pain, lumps, or nipple discharge.   HEMATOLOGIC: No easy bruisability or excessive bleeding.   MUSCULOSKELETAL: Denies joint pain or swelling.   NEUROLOGIC: Denies syncope or weakness.   PSYCHIATRIC: Denies depression, anxiety or mood swings.    PE:   APPEARANCE: Well nourished, well developed, White female in no acute distress.  NODES: no cervical, supraclavicular, or inguinal lymphadenopathy  BREASTS: Symmetrical, no skin changes or visible lesions. No palpable masses, nipple discharge or adenopathy bilaterally.  ABDOMEN: Soft. No tenderness or masses. No distention. No hernias palpated.   VULVA: No lesions. Normal external female genitalia.  URETHRAL MEATUS: Normal size and  location, no lesions, no prolapse.  URETHRA: No masses, tenderness, or prolapse.  VAGINA: Moist. No lesions or lacerations noted. No abnormal discharge present. No odor present.   CERVIX: No lesions or discharge. No cervical motion tenderness. IUD strings not visualized   UTERUS: Normal size, regular shape, mobile, non-tender.  ADNEXA: No tenderness. No fullness or masses palpated in the adnexal regions.   ANUS PERINEUM: Normal.    Diagnosis:  1. Well woman exam with routine gynecological exam    2. Cervical cancer screening    3. H/O colon cancer, stage II    4. History of HPV infection    5. Screen for STD (sexually transmitted disease)    6. Other problems related to lifestyle    7. Need for HPV vaccination    8. Urinary frequency      Plan:     Orders Placed This Encounter    HPV High Risk Genotypes, PCR    (In Office Administered) HPV Vaccine (9-Valent) (3 Dose) (IM)    C. trachomatis/N. gonorrhoeae by AMP DNA [facility]; Cervicovaginal    HIV 1/2 Ag/Ab (4th Gen)    Hepatitis B Surface Antigen    HEPATITIS C ANTIBODY    Treponema Pallidium Antibodies IgG, IgM    Ambulatory referral/consult to Urology    POCT URINE DIPSTICK WITHOUT MICROSCOPE    Liquid-Based Pap Smear, Screening     - Pap and HPV updated per patient request  - GC/CT collected  - Blood work STD testing ordered   - HPV vaccine ordered   - Urology consult for urinary frequency   - U dip negative    Patient was counseled today on the new ACS guidelines for cervical cytology screening as well as the current recommendations for breast cancer screening. She was counseled to follow up with her PCP for other routine health maintenance. Counseling session lasted approximately 10 minutes, and all her questions were answered.  For women over the age of 65, you can stop having cervical cancer screenings if you have never had abnormal cervical cells or cervical cancer, and youve had three negative Pap tests in a row. (You also can stop screening if youve had  two negative Pap and HPV tests in a row in the past 10 years, with at least one test in the past 5 years.),    Follow-up with me in 1 year for routine exam; pap, pending results      Pita Null, KRISTEL SIDDIQI

## 2024-07-19 ENCOUNTER — PATIENT OUTREACH (OUTPATIENT)
Dept: ADMINISTRATIVE | Facility: HOSPITAL | Age: 39
End: 2024-07-19
Payer: COMMERCIAL

## 2024-07-19 ENCOUNTER — PATIENT MESSAGE (OUTPATIENT)
Dept: ADMINISTRATIVE | Facility: HOSPITAL | Age: 39
End: 2024-07-19
Payer: COMMERCIAL

## 2024-07-19 DIAGNOSIS — C18.7 MALIGNANT NEOPLASM OF SIGMOID COLON: Primary | ICD-10-CM

## 2024-07-19 DIAGNOSIS — Z12.11 SCREENING FOR COLON CANCER: ICD-10-CM

## 2024-07-19 DIAGNOSIS — Z85.038 PERSONAL HISTORY OF COLON CANCER: ICD-10-CM

## 2024-07-19 LAB
C TRACH DNA SPEC QL NAA+PROBE: NOT DETECTED
N GONORRHOEA DNA SPEC QL NAA+PROBE: NOT DETECTED

## 2024-07-19 NOTE — PROGRESS NOTES
Health Maintenance Due   Topic Date Due    Colorectal Cancer Screening  10/12/2024     Immunizations - reviewed and updated   Care Everywhere - triggered   Care Teams - updated   Outreach - Epic Campaigns outreach done. Patient with a HX of colon cancer. Colonoscopy done last 10/12/2023, recommended to repeat in 1 year for surveillance. Order for endo  for [facility] placed. Patient notified via patient portal

## 2024-07-22 NOTE — PROGRESS NOTES
Subjective:      Flora Edmond is a 38 y.o. female who presents for evaluation of urinary frequency.      Reports bothersome urinary frequency that has worsened in the last year. Also reports stress incontinence with sneezing. Denies POP/vaginal bulge   Denies GH, STELLA  No previous  surgeries   No hx of renal stones    Was dx with colon cancer last year- had resection, did not have to do chemo.     The following portions of the patient's history were reviewed and updated as appropriate: allergies, current medications, past family history, past medical history, past social history, past surgical history and problem list.    Review of Systems  Constitutional: no fever or chills  ENT: no nasal congestion or sore throat  Respiratory: no cough or shortness of breath  Cardiovascular: no chest pain or palpitations  Gastrointestinal: no nausea or vomiting, tolerating diet  Genitourinary: as per HPI  Hematologic/Lymphatic: no easy bruising or lymphadenopathy  Musculoskeletal: no arthralgias or myalgias  Neurological: no seizures or tremors  Behavioral/Psych: no auditory or visual hallucinations     Objective:   Vital Signs:/81 (BP Location: Left arm, Patient Position: Sitting, BP Method: Medium (Automatic))   Pulse 88   Wt 65 kg (143 lb 4.8 oz)   BMI 23.85 kg/m²     Physical Exam   General: alert and oriented, no acute distress  Head: normocephalic, atraumatic  Neck: supple, no lymphadenopathy, normal ROM, no masses  Respiratory: Symmetric expansion, non-labored breathing  Skin: normal coloration and turgor, no rashes, no suspicious skin lesions noted  Neuro: alert and oriented x3, no gross deficits  Psych: normal judgment and insight, normal mood/affect, and non-anxious    Lab Review   Urinalysis demonstrates positive for red blood cells  Lab Results   Component Value Date    WBC 4.44 04/04/2024    HGB 13.2 04/04/2024    HCT 40.1 04/04/2024    MCV 90 04/04/2024     04/04/2024     Lab Results   Component  Value Date    CREATININE 0.8 04/04/2024    BUN 12 04/04/2024       Assessment and Plan:   1. Urinary frequency  2. Stress incontinence  Discussed treatment options for OAB and urge incontinence, including anticholinergics, myrbetriq, botox, and interstim  Discussed common bladder irritants such as caffeine, alcohol, citrus, and acidic and spicy foods. Encouraged to minimize in diet to reduce symptoms.  --will send UC, UA, mycoplasma and ureaplasma today   --wishes to avoid medication theraly at this time   --ref to PFPT; at home PFPT instructions provided to patient           - Ambulatory referral/consult to Physical/Occupational Therapy; Future  - Mycoplasma genitalium Molecular Detection, PCR Urine; Future  - Urine culture; Future  - Urinalysis Microscopic; Future  - Ureaplasma PCR Urine; Future     --will notify with results  --RTC in 2-3 months for symptom assessment     This note is dictated on M*Modal word recognition program.  There are word recognition mistakes that are occasionally missed on review.

## 2024-07-23 LAB
CLINICAL INFO: NORMAL
CYTO CVX: NORMAL
CYTOLOGIST CVX/VAG CYTO: NORMAL
CYTOLOGIST CVX/VAG CYTO: NORMAL
CYTOLOGY CMNT CVX/VAG CYTO-IMP: NORMAL
CYTOLOGY PAP THIN PREP EXPLANATION: NORMAL
DATE OF PREVIOUS PAP: NORMAL
DATE PREVIOUS BX: NORMAL
GEN CATEG CVX/VAG CYTO-IMP: NORMAL
HPV I/H RISK 4 DNA CVX QL NAA+PROBE: NOT DETECTED
LMP START DATE: NORMAL
MICROORGANISM CVX/VAG CYTO: NORMAL
PATHOLOGIST CVX/VAG CYTO: NORMAL
SERVICE CMNT-IMP: NORMAL
SPECIMEN SOURCE CVX/VAG CYTO: NORMAL
STAT OF ADQ CVX/VAG CYTO-IMP: NORMAL

## 2024-07-24 ENCOUNTER — PATIENT MESSAGE (OUTPATIENT)
Dept: INFECTIOUS DISEASES | Facility: CLINIC | Age: 39
End: 2024-07-24
Payer: COMMERCIAL

## 2024-07-25 ENCOUNTER — OFFICE VISIT (OUTPATIENT)
Dept: UROLOGY | Facility: CLINIC | Age: 39
End: 2024-07-25
Payer: COMMERCIAL

## 2024-07-25 VITALS
HEART RATE: 88 BPM | DIASTOLIC BLOOD PRESSURE: 81 MMHG | BODY MASS INDEX: 23.85 KG/M2 | SYSTOLIC BLOOD PRESSURE: 117 MMHG | WEIGHT: 143.31 LBS

## 2024-07-25 DIAGNOSIS — N39.3 STRESS INCONTINENCE: Primary | ICD-10-CM

## 2024-07-25 DIAGNOSIS — R35.0 URINARY FREQUENCY: ICD-10-CM

## 2024-07-25 LAB
BILIRUB SERPL-MCNC: NORMAL MG/DL
BLOOD URINE, POC: NORMAL
CLARITY, POC UA: NORMAL
COLOR, POC UA: YELLOW
GLUCOSE UR QL STRIP: NORMAL
KETONES UR QL STRIP: NORMAL
LEUKOCYTE ESTERASE URINE, POC: NORMAL
NITRITE, POC UA: NORMAL
PH, POC UA: 5
PROTEIN, POC: NORMAL
SPECIFIC GRAVITY, POC UA: 1.02
UROBILINOGEN, POC UA: NORMAL

## 2024-07-25 PROCEDURE — 99204 OFFICE O/P NEW MOD 45 MIN: CPT | Mod: S$GLB,,, | Performed by: NURSE PRACTITIONER

## 2024-07-25 PROCEDURE — 3079F DIAST BP 80-89 MM HG: CPT | Mod: CPTII,S$GLB,, | Performed by: NURSE PRACTITIONER

## 2024-07-25 PROCEDURE — 1160F RVW MEDS BY RX/DR IN RCRD: CPT | Mod: CPTII,S$GLB,, | Performed by: NURSE PRACTITIONER

## 2024-07-25 PROCEDURE — 1159F MED LIST DOCD IN RCRD: CPT | Mod: CPTII,S$GLB,, | Performed by: NURSE PRACTITIONER

## 2024-07-25 PROCEDURE — 99999 PR PBB SHADOW E&M-EST. PATIENT-LVL V: CPT | Mod: PBBFAC,,, | Performed by: NURSE PRACTITIONER

## 2024-07-25 PROCEDURE — 3008F BODY MASS INDEX DOCD: CPT | Mod: CPTII,S$GLB,, | Performed by: NURSE PRACTITIONER

## 2024-07-25 PROCEDURE — 3074F SYST BP LT 130 MM HG: CPT | Mod: CPTII,S$GLB,, | Performed by: NURSE PRACTITIONER

## 2024-07-25 PROCEDURE — 81002 URINALYSIS NONAUTO W/O SCOPE: CPT | Mod: S$GLB,,, | Performed by: NURSE PRACTITIONER

## 2024-07-26 ENCOUNTER — HOSPITAL ENCOUNTER (OUTPATIENT)
Dept: RADIOLOGY | Facility: HOSPITAL | Age: 39
Discharge: HOME | End: 2024-07-26
Attending: INTERNAL MEDICINE
Payer: COMMERCIAL

## 2024-07-26 ENCOUNTER — TELEPHONE (OUTPATIENT)
Dept: INFECTIOUS DISEASES | Facility: CLINIC | Age: 39
End: 2024-07-26
Payer: COMMERCIAL

## 2024-07-26 DIAGNOSIS — Z71.84 TRAVEL ADVICE ENCOUNTER: Primary | ICD-10-CM

## 2024-07-26 DIAGNOSIS — R35.0 URINARY FREQUENCY: Primary | ICD-10-CM

## 2024-07-26 DIAGNOSIS — C18.7 MALIGNANT NEOPLASM OF SIGMOID COLON: ICD-10-CM

## 2024-07-26 PROCEDURE — 71260 CT THORAX DX C+: CPT | Mod: TC

## 2024-07-26 PROCEDURE — 74177 CT ABD & PELVIS W/CONTRAST: CPT | Mod: 26,,, | Performed by: RADIOLOGY

## 2024-07-26 PROCEDURE — 71260 CT THORAX DX C+: CPT | Mod: 26,,, | Performed by: RADIOLOGY

## 2024-07-26 PROCEDURE — A9698 NON-RAD CONTRAST MATERIALNOC: HCPCS | Performed by: INTERNAL MEDICINE

## 2024-07-26 PROCEDURE — 25500020 PHARM REV CODE 255: Performed by: INTERNAL MEDICINE

## 2024-07-26 RX ORDER — AZITHROMYCIN 500 MG/1
500 TABLET, FILM COATED ORAL DAILY
Qty: 3 TABLET | Refills: 0 | Status: SHIPPED | OUTPATIENT
Start: 2024-07-26 | End: 2024-07-29

## 2024-07-26 RX ORDER — ATOVAQUONE AND PROGUANIL HYDROCHLORIDE 250; 100 MG/1; MG/1
TABLET, FILM COATED ORAL
Qty: 24 TABLET | Refills: 0 | Status: SHIPPED | OUTPATIENT
Start: 2024-07-26

## 2024-07-26 RX ADMIN — BARIUM SULFATE 450 ML: 20 SUSPENSION ORAL at 11:07

## 2024-07-26 RX ADMIN — IOHEXOL 75 ML: 350 INJECTION, SOLUTION INTRAVENOUS at 11:07

## 2024-07-27 NOTE — TELEPHONE ENCOUNTER
Patient traveling to [place].  Will send malaria prophylaxis and medication for traveler's diarrhea.

## 2024-07-31 ENCOUNTER — PATIENT MESSAGE (OUTPATIENT)
Dept: OBSTETRICS AND GYNECOLOGY | Facility: CLINIC | Age: 39
End: 2024-07-31
Payer: COMMERCIAL

## 2024-07-31 DIAGNOSIS — Z30.431 IUD CHECK UP: Primary | ICD-10-CM

## 2024-08-01 ENCOUNTER — OFFICE VISIT (OUTPATIENT)
Dept: HEMATOLOGY/ONCOLOGY | Facility: CLINIC | Age: 39
End: 2024-08-01
Payer: COMMERCIAL

## 2024-08-01 VITALS
DIASTOLIC BLOOD PRESSURE: 58 MMHG | HEART RATE: 84 BPM | HEIGHT: 65 IN | OXYGEN SATURATION: 100 % | SYSTOLIC BLOOD PRESSURE: 103 MMHG | WEIGHT: 142.31 LBS | BODY MASS INDEX: 23.71 KG/M2

## 2024-08-01 DIAGNOSIS — E55.9 VITAMIN D DEFICIENCY: ICD-10-CM

## 2024-08-01 DIAGNOSIS — D50.0 IRON DEFICIENCY ANEMIA DUE TO CHRONIC BLOOD LOSS: ICD-10-CM

## 2024-08-01 DIAGNOSIS — C18.7 MALIGNANT NEOPLASM OF SIGMOID COLON: Primary | ICD-10-CM

## 2024-08-01 DIAGNOSIS — M25.551 RIGHT HIP PAIN: ICD-10-CM

## 2024-08-01 PROCEDURE — 99999 PR PBB SHADOW E&M-EST. PATIENT-LVL IV: CPT | Mod: PBBFAC,,, | Performed by: INTERNAL MEDICINE

## 2024-08-01 PROCEDURE — 3078F DIAST BP <80 MM HG: CPT | Mod: CPTII,S$GLB,, | Performed by: INTERNAL MEDICINE

## 2024-08-01 PROCEDURE — 3074F SYST BP LT 130 MM HG: CPT | Mod: CPTII,S$GLB,, | Performed by: INTERNAL MEDICINE

## 2024-08-01 PROCEDURE — 1159F MED LIST DOCD IN RCRD: CPT | Mod: CPTII,S$GLB,, | Performed by: INTERNAL MEDICINE

## 2024-08-01 PROCEDURE — 3008F BODY MASS INDEX DOCD: CPT | Mod: CPTII,S$GLB,, | Performed by: INTERNAL MEDICINE

## 2024-08-01 PROCEDURE — 99214 OFFICE O/P EST MOD 30 MIN: CPT | Mod: S$GLB,,, | Performed by: INTERNAL MEDICINE

## 2024-08-01 PROCEDURE — G2211 COMPLEX E/M VISIT ADD ON: HCPCS | Mod: S$GLB,,, | Performed by: INTERNAL MEDICINE

## 2024-08-01 NOTE — PROGRESS NOTES
MEDICAL ONCOLOGY - ESTABLISHED PATIENT VISIT    Reason for visit: Colon cancer    Best Contact Phone Number(s): [phone] (home)      Cancer/Stage/TNM:    Cancer Staging   Malignant neoplasm of sigmoid colon  Staging form: Colon and Rectum, AJCC 8th Edition  - Pathologic stage from 7/20/2023: Stage IIA (pT3, pN0, cM0) - Signed by Surinder Youssef MD on 10/26/2023       Oncology History   Malignant neoplasm of sigmoid colon   7/6/2023 Initial Diagnosis    Malignant neoplasm of sigmoid colon     7/20/2023 Cancer Staged    Staging form: Colon and Rectum, AJCC 8th Edition  - Pathologic stage from 7/20/2023: Stage IIA (pT3, pN0, cM0)          Interim History:   38 y.o. female with thoracic outlet syndrome and sigmoid colon cancer s/p laparoscopic sigmoidectomy with Dr. Nesbitt on 7/15/23 who presents for follow-up while on surveillance.    She is overall feeling well. She notes increased urinary and fecal urgency since flex sig in April. She is going to start pelvic floor therapy soon for the symptoms. She reports once episode of blood in her stool that was attributed to hemorrhoids. She denies abdominal or pelvic pain. She is eating well.     Last [facility] ctDNA was 7/24 and was negative.    She presents alone today.  ECOG PS 0.    ROS:   Review of Systems   Constitutional:  Negative for malaise/fatigue and weight loss.   HENT:  Negative for hearing loss, sore throat and tinnitus.    Eyes:  Negative for blurred vision and pain.   Respiratory:  Negative for cough and shortness of breath.    Cardiovascular:  Negative for chest pain and leg swelling.   Gastrointestinal:  Negative for abdominal pain, constipation, diarrhea, nausea and vomiting.   Genitourinary:  Positive for urgency. Negative for dysuria and frequency.   Musculoskeletal:  Negative for back pain, falls and myalgias.   Skin:  Negative for rash.   Neurological:  Negative for weakness and headaches.   Endo/Heme/Allergies:  Does not bruise/bleed easily.    Psychiatric/Behavioral:  Negative for depression. The patient is not nervous/anxious.    All other systems reviewed and are negative.      Past Medical History:   Past Medical History:   Diagnosis Date    Allergy     seasonal    Cervical rib     Vitamin D deficiency         Past Surgical History:   Past Surgical History:   Procedure Laterality Date    COLONOSCOPY N/A 6/30/2023    Procedure: COLONOSCOPY;  Surgeon: Andriy Vargas MD;  Location: [facility] ENDOSCOPY;  Service: Endoscopy;  Laterality: N/A;  Sutab  Prep instructions given to pt in clinic -    COLONOSCOPY N/A 10/12/2023    Procedure: COLONOSCOPY;  Surgeon: Alda Partida MD;  Location: [facility] ENDO ([address] [place] [address]);  Service: Endoscopy;  Laterality: N/A;  8/21- referred to Dr. Partida/ Prep instructions to portal. AS.pt request sooner appt with the first available with any provider. r/s 10.13.23 with  at [facility]  8/30-pt to be done with Dr. Partida-MS  10/5-precall complete-MS    WISDOM TOOTH EXTRACTION  2007        Family History:   Family History   Problem Relation Name Age of Onset    Breast cancer Maternal Grandmother Mio 61    Hemophilia Father Micheal Mayito         Factor IX    Prostate cancer Father Micheal Mayito 72        Emi 6 or 7    Hyperlipidemia Father Micheal Mayito     Coronary artery disease Father Micheal Mayito     Heart disease Father Micheal Mayito     Thyroid disease Mother Lilo Guillen     Breast cancer Mother Lilo Guillen 72        triple negative, CHEK2 VUS    Asthma Mother Lilo Guillen     No Known Problems Sister      Prostate cancer Paternal Uncle Sam Edmond 55    Prostate cancer Paternal Grandfather Ravindra Collinser 70    COPD Paternal Grandfather Ravindra Mayito     Heart disease Paternal Grandfather Ravindra Mayito     Hemophilia Other      Breast cancer Other          dx @ 60s    Colon cancer Neg Hx      Ovarian cancer Neg Hx          Social History:   Social History     Tobacco Use    Smoking status: Former     Types: Vaping with  nicotine    Smokeless tobacco: Never    Tobacco comments:     Stop smoking 2019   Substance Use Topics    Alcohol use: Yes     Alcohol/week: 5.0 standard drinks of alcohol     Types: 5 Glasses of wine per week     Comment: social        I have reviewed and updated the patient's past medical, surgical, family and social histories.    Allergies:   Review of patient's allergies indicates:   Allergen Reactions    Cat/feline products Itching        Medications:   Current Outpatient Medications   Medication Sig Dispense Refill    atovaquone-proguaniL (MALARONE) 250-100 mg Tab Take one tablet daily for malaria prevention. Begin one day before entering malarious area and continue for 1 week after return. 24 tablet 0    levonorgestreL (MIRENA) 21 mcg/24 hours (8 yrs) 52 mg IUD by Intrauterine route.      buPROPion (WELLBUTRIN XL) 150 MG TB24 tablet TAKE 1 TABLET BY MOUTH DAILY FIRST 3 DAYS, WHEN STARTING MEDICATION, THEN 2 TABLETS DAILY THEREAFTER. (Patient not taking: Reported on 7/25/2024) 60 tablet 5    busPIRone (BUSPAR) 10 MG tablet Take 1/2 tablet (5 mg) three times daily for first week, then 1 tablet twice daily thereafter (Patient not taking: Reported on 7/25/2024) 60 tablet 2    ELIDEL 1 % cream Use bid prn rash (Patient not taking: Reported on 7/25/2024) 60 g 3    erythromycin (ROMYCIN) ophthalmic ointment Place into both eyes 3 (three) times daily. (Patient not taking: Reported on 7/12/2024) 3.5 g 0    hydrocortisone (ANUSOL-HC) 2.5 % rectal cream Place rectally 2 (two) times daily. (Patient not taking: Reported on 7/25/2024) 28 g 2    ibuprofen (ADVIL,MOTRIN) 200 MG tablet Take 400 mg by mouth. (Patient not taking: Reported on 7/25/2024)      loratadine-pseudoephedrine 5-120 mg (CLARITIN-D 12-HOUR) 5-120 mg per tablet Take by mouth every 12 (twelve) hours. (Patient not taking: Reported on 7/25/2024)      ondansetron (ZOFRAN-ODT) 8 MG TbDL Take 1 tablet (8 mg total) by mouth every 8 (eight) hours as needed  "(nausea). (Patient not taking: Reported on 7/25/2024) 30 tablet 0    perfluorohexyloctane, PF, 100 % Drop Place 1 drop into both eyes 4 (four) times daily. (Patient not taking: Reported on 7/25/2024) 3 mL 11    traMADoL (ULTRAM) 50 mg tablet Take 1 tablet (50 mg total) by mouth every 8 (eight) hours as needed for Pain. (Patient not taking: Reported on 7/25/2024) 20 tablet 0     No current facility-administered medications for this visit.        Physical Exam:   BP (!) 103/58 (BP Location: Left arm, Patient Position: Sitting, BP Method: Medium (Automatic))   Pulse 84   Ht 5' 5" (1.651 m)   Wt 64.5 kg (142 lb 4.9 oz)   SpO2 100%   BMI 23.68 kg/m²      ECOG Performance status: 0            Physical Exam  Vitals reviewed.   Constitutional:       General: She is not in acute distress.     Appearance: Normal appearance. She is normal weight.   HENT:      Head: Normocephalic and atraumatic.      Right Ear: External ear normal.      Left Ear: External ear normal.      Nose: Nose normal.      Mouth/Throat:      Mouth: Mucous membranes are moist.      Pharynx: Oropharynx is clear. No posterior oropharyngeal erythema.   Eyes:      General: No scleral icterus.     Extraocular Movements: Extraocular movements intact.      Conjunctiva/sclera: Conjunctivae normal.      Pupils: Pupils are equal, round, and reactive to light.   Cardiovascular:      Rate and Rhythm: Normal rate and regular rhythm.      Pulses: Normal pulses.      Heart sounds: Normal heart sounds.   Pulmonary:      Effort: Pulmonary effort is normal.      Breath sounds: Normal breath sounds. No wheezing or rales.   Abdominal:      General: Bowel sounds are normal. There is no distension.      Palpations: Abdomen is soft.      Tenderness: There is no abdominal tenderness.   Musculoskeletal:         General: No swelling. Normal range of motion.      Cervical back: Normal range of motion and neck supple.   Skin:     General: Skin is warm.      Coloration: Skin is " not jaundiced.      Findings: No erythema or rash.   Neurological:      General: No focal deficit present.      Mental Status: She is alert and oriented to person, place, and time. Mental status is at baseline.      Gait: Gait normal.   Psychiatric:         Mood and Affect: Mood normal.         Behavior: Behavior normal.         Thought Content: Thought content normal.         Judgment: Judgment normal.           Labs:   No results found for this or any previous visit (from the past 48 hour(s)).     I have reviewed the pertinent labs from 7/26/24 which are notable for normal WBC, Hgb, platelets.  Normal LFTs and Cr.  CEA <1.7.    Imaging:       I have personally reviewed the 7/26/24 CT imaging which shows no clear evidence of disease recurrence.    Path:   Final Pathologic Diagnosis     1. Sigmoid colon, mass, biopsy:   Invasive adenocarcinoma.  VC      Comment: Interp By Herbert Beard M.D., Signed on 07/10/2023 at 10:18   Supplemental Diagnosis     Immunohistochemistry (IHC) Testing for Mismatch Repair (MMR) Proteins     MLH1   Intact nuclear expression     MSH2   Intact nuclear expression     MSH6   Intact nuclear expression     PMS2   Intact nuclear expression     Background nonneoplastic tissue/internal control with intact nuclear expression     IHC Interpretation   No loss of nuclear expression of MMR proteins: low probability of microsatellite instability-high (MSI-H)          7/15/23 Sigmoidectomy:     COLON AND RECTUM: Resection, Including Transanal Disk Excision of Rectal Neoplasms   COLON AND RECTUM: RESECTION - All Specimens   8th Edition - Protocol posted: 6/22/2022     SPECIMEN      Procedure:    Sigmoidectomy     TUMOR      Tumor Site:    Sigmoid colon      Histologic Type:    Adenocarcinoma      Histologic Grade:    G2, moderately differentiated      Tumor Size:    Greatest dimension (Centimeters): 5.5 cm      Tumor Extent:    Invades through muscularis propria into the pericolonic or perirectal  tissue      Macroscopic Tumor Perforation:    Not identified      Lymphovascular Invasion:    Not identified      Perineural Invasion:    Not identified      Tumor Bud Score:    Low (0-4)      Treatment Effect:    No known presurgical therapy     MARGINS      Margin Status for Invasive Carcinoma:    All margins negative for invasive carcinoma        Closest Margin(s) to Invasive Carcinoma:    Proximal        Distance from Invasive Carcinoma to Closest Margin:    2.8 cm      Margin Status for Non-Invasive Tumor:    All margins negative for high-grade dysplasia / intramucosal carcinoma and low-grade dysplasia     REGIONAL LYMPH NODES      Regional Lymph Node Status:            :    All regional lymph nodes negative for tumor        Number of Lymph Nodes Examined:    55      Tumor Deposits:    Not identified     PATHOLOGIC STAGE CLASSIFICATION (pTNM, AJCC 8th Edition)      Reporting of pT, pN, and (when applicable) pM categories is based on information available to the pathologist at the time the report is issued. As per the AJCC (Chapter 1, 8th Ed.) it is the managing physician's responsibility to establish the final pathologic stage based upon all pertinent information, including but potentially not limited to this pathology report.      pT Category:    pT3      pN Category:    pN0            Assessment:       1. Malignant neoplasm of sigmoid colon    2. Right hip pain    3. Vitamin D deficiency    4. Iron deficiency anemia due to chronic blood loss            Plan:           # Sigmoid colon cancer  She presented with several months of symptoms and was found to have a sigmoid colon adenocarcinoma, pMMR on 6/30/23 colonoscopy.  She had no clear evidence of distant metastatic disease on her pre-operative CT imaging. Saw Dr. Partida here and then went to [facility] for surgical consultation.  MRI there showed T4a N1 disease.  Underwent laparoscopic sigmoidectomy with Dr. Nesbitt on 7/15/23 without need for ostomy.   "Regained bowel function and returned to [facility] 7/18/23.    Pathology from sigmoidectomy showed pT3N0 tumor with 55 negative lymph nodes, no LVI or PNI, negative margins.  Low tumor budding score, no perforation.    I discussed with her that the data are mixed on "low risk" stage II colon adenocarcinoma, pMMR, but I generally recommend active surveillance alone.  Another reasonable approach is six months of single agent capecitabine or 5-FU/LV.  There is not a survival benefit from the addition of oxaliplatin in this setting as per the MOSAIC study.  She elected to proceed with active surveillance.    Completion colonoscopy 10/11/23 with Dr. Partida - 2 polyps removed. Next due 10/2024.    She underwent ctDNA testing with [facility] on 7/24 with mobile phlebotomy. This was negative. Repeat [facility] 10/2024.    Plan to return in 6 months with CBC, CMP, CEA & CT CAP.    # R hip pain  Chronic, intermittent.  (+) for HLA B27.  Saw Dr. West who did not feel that her presentation was consistent with ankylosing spondylitis.  He reviewed her prior imaging.    # Vit D Deficiency  She is off vitamin D. Had some prior osteopenia that improved.    # Iron deficiency anemia  Resolved. Received IV venofer. Secondary to prior cancer.    Follow up: 6 months.    The above information has been reviewed with the patient and all questions have been answered to their apparent satisfaction.  They understand that they can call the clinic with any questions.    Discussed with Dr. Youssef.    Yasmine Hooker MD   Hematology and Oncology Fellow, PGY VI       Route Chart for Scheduling    Med Onc Chart Routing      Follow up with physician 6 months. Follow up in 6 months   Follow up with [facility]    Infusion scheduling note    Injection scheduling note    Labs CMP, CBC and CEA   Scheduling:  Preferred lab:  Lab interval:  CBC, CMP, CEA in 6 months prior to visit   Imaging CT chest abdomen pelvis   CT C/A/P in 6 months 1-2 days prior to visit "   Pharmacy appointment    Other referrals                    Therapy Plan Information  Medications  iron sucrose (VENOFER) 300 mg in sodium chloride 0.9% 250 mL IVPB  300 mg, Intravenous, 1 time a week  Anaphylaxis/Hypersensitivity  EPINEPHrine (EPIPEN) 0.3 mg/0.3 mL pen injection 0.3 mg  0.3 mg, Intramuscular, PRN  diphenhydrAMINE injection 50 mg  50 mg, Intravenous, PRN  hydrocortisone sodium succinate injection 100 mg  100 mg, Intravenous, PRN  Flushes  sodium chloride 0.9% 250 mL flush bag  Intravenous, 1 time a week  sodium chloride 0.9% flush 10 mL  10 mL, Intravenous, 1 time a week  heparin, porcine (PF) 100 unit/mL injection flush 500 Units  500 Units, Intravenous, 1 time a week  alteplase injection 2 mg  2 mg, Intra-Catheter, 1 time a week

## 2024-08-13 ENCOUNTER — TELEPHONE (OUTPATIENT)
Dept: OBSTETRICS AND GYNECOLOGY | Facility: CLINIC | Age: 39
End: 2024-08-13
Payer: COMMERCIAL

## 2024-08-13 DIAGNOSIS — T83.32XA MALPOSITIONED INTRAUTERINE DEVICE (IUD), INITIAL ENCOUNTER: ICD-10-CM

## 2024-08-13 DIAGNOSIS — Z30.014 ENCOUNTER FOR INITIAL PRESCRIPTION OF INTRAUTERINE CONTRACEPTIVE DEVICE (IUD): Primary | ICD-10-CM

## 2024-08-13 NOTE — TELEPHONE ENCOUNTER
----- Message from Pita Null NP sent at 8/13/2024  1:05 PM CDT -----  Hey,     This is a pt of Dr. Portillo- I saw her for annual this year. Her IUD is slightly embedded in myometrium. I could not see strings on exam when I saw her. She would like removal/ replacement if possible. I was hoping you could assist with scheduling this with Dr. Portillo?     Thanks  Pita

## 2024-08-13 NOTE — PROGRESS NOTES
"High Risk Breast Clinic note      Reason For Consultation: High-risk for breast cancer      Records Obtained: Records of the patients history including those obtained from the referring provider were reviewed and summarized in detail.    HPI:   Flora Edmond is a 39 y.o. who presents for consultation of increased risk of breast cancer.      Today, Feels good and no complaints.   No breast concerns.      High Risk Breast cancer specific history:  - Age: 39 y.o.   - Height/Weight:  Estimated body surface area is 1.72 meters squared as calculated from the following:    Height as of 24: 5' 5" (1.651 m).    Weight as of 24: 64.5 kg (142 lb 4.9 oz).  - There is no height or weight on file to calculate BMI.  - Breast density per BI-RADS:  Unknown  - Age at menarche:   16  - Number of pregnancies: ; age of first live birth:    - History of breast feeding:      -Uterus and ovaries intact: Yes  - Menopausal status: premenopausal. Age at menopause, if applicable:    N/A  - HRT: No; Currently has mirena   - Genetic testing:  Yes   - Personal history of cancer: Yes Colon Cancer May 2023  - Previous chest radiation exposure between ages 10-30 years old: No  - Personal history of breast biopsy:  No  - Ashken[employer] Inheritance:  No Other   - Family history of cancer:    Cancer-related family history includes Breast cancer in an other family member; Breast cancer (age of onset: 61) in her maternal grandmother; Breast cancer (age of onset: 72) in her mother; Prostate cancer (age of onset: 55) in her paternal uncle; Prostate cancer (age of onset: 70) in her paternal grandfather; Prostate cancer (age of onset: 72) in her father. There is no history of Ovarian cancer. Triple negative breast cancer in mother     Social History   Social History     Tobacco Use    Smoking status: Former     Types: Vaping with nicotine    Smokeless tobacco: Never    Tobacco comments:     Stop smoking 2019   Substance Use Topics    " Alcohol use: Yes     Alcohol/week: 5.0 standard drinks of alcohol     Types: 5 Glasses of wine per week     Comment: social    Drug use: No     Exercise regimen: Not currently  Patient's occupation: Works from home     SEE CALCULATED RISK BELOW.     Past Medical   Past Medical History:   Diagnosis Date    Allergy     seasonal    Cervical rib     Vitamin D deficiency      Patient Active Problem List   Diagnosis    Osteopenia [facility] by [facility]xa at [facility] Jan 2015    HSV-1 (herpes simplex virus 1) infection    Vitamin D deficiency    Family history of hemophilia B    Iron deficiency anemia due to chronic blood loss    Malignant neoplasm of sigmoid colon    Thoracic outlet syndrome    HLA B27 (HLA B27 positive)     Family History  Family History   Problem Relation Name Age of Onset    Breast cancer Maternal Grandmother Mio 61    Hemophilia Father Micheal Edmond         Factor IX    Prostate cancer Father Micheal Edmond 72        [place] 6 or 7    Hyperlipidemia Father Micheal Edmond     Coronary artery disease Father Micheal Edmond     Heart disease Father Micheal Edmond     Thyroid disease Mother Lilo Guillen     Breast cancer Mother Lilo Guillen 72        triple negative, CHEK2 VUS    Asthma Mother Lilo Guillen     No Known Problems Sister      Prostate cancer Paternal Uncle Sam Edmond 55    Prostate cancer Paternal Grandfather Ravindra Edmond 70    COPD Paternal Grandfather Ravindra Edmond     Heart disease Paternal Grandfather Ravindra Edmond     Hemophilia Other      Breast cancer Other          dx @ 60s    Colon cancer Neg Hx      Ovarian cancer Neg Hx       Medications    Current Outpatient Medications:     atovaquone-proguaniL (MALARONE) 250-100 mg Tab, Take one tablet daily for malaria prevention. Begin one day before entering malarious area and continue for 1 week after return., Disp: 24 tablet, Rfl: 0    buPROPion (WELLBUTRIN XL) 150 MG TB24 tablet, TAKE 1 TABLET BY MOUTH DAILY FIRST 3 DAYS, WHEN STARTING MEDICATION, THEN 2 TABLETS  DAILY THEREAFTER. (Patient not taking: Reported on 7/25/2024), Disp: 60 tablet, Rfl: 5    busPIRone (BUSPAR) 10 MG tablet, Take 1/2 tablet (5 mg) three times daily for first week, then 1 tablet twice daily thereafter (Patient not taking: Reported on 7/25/2024), Disp: 60 tablet, Rfl: 2    ELIDEL 1 % cream, Use bid prn rash (Patient not taking: Reported on 7/25/2024), Disp: 60 g, Rfl: 3    erythromycin (ROMYCIN) ophthalmic ointment, Place into both eyes 3 (three) times daily. (Patient not taking: Reported on 7/12/2024), Disp: 3.5 g, Rfl: 0    hydrocortisone (ANUSOL-HC) 2.5 % rectal cream, Place rectally 2 (two) times daily. (Patient not taking: Reported on 7/25/2024), Disp: 28 g, Rfl: 2    ibuprofen (ADVIL,MOTRIN) 200 MG tablet, Take 400 mg by mouth. (Patient not taking: Reported on 7/25/2024), Disp: , Rfl:     levonorgestreL (MIRENA) 21 mcg/24 hours (8 yrs) 52 mg IUD, by Intrauterine route., Disp: , Rfl:     loratadine-pseudoephedrine 5-120 mg (CLARITIN-D 12-HOUR) 5-120 mg per tablet, Take by mouth every 12 (twelve) hours. (Patient not taking: Reported on 7/25/2024), Disp: , Rfl:     ondansetron (ZOFRAN-ODT) 8 MG TbDL, Take 1 tablet (8 mg total) by mouth every 8 (eight) hours as needed (nausea). (Patient not taking: Reported on 7/25/2024), Disp: 30 tablet, Rfl: 0    perfluorohexyloctane, PF, 100 % Drop, Place 1 drop into both eyes 4 (four) times daily. (Patient not taking: Reported on 7/25/2024), Disp: 3 mL, Rfl: 11    traMADoL (ULTRAM) 50 mg tablet, Take 1 tablet (50 mg total) by mouth every 8 (eight) hours as needed for Pain. (Patient not taking: Reported on 7/25/2024), Disp: 20 tablet, Rfl: 0  Allergies  Review of patient's allergies indicates:   Allergen Reactions    Cat/feline products Itching       Review of Systems       See above   All other systems reviewed and are negative.    Objective:      Vitals: There were no vitals filed for this visit.  BMI: There is no height or weight on file to calculate BMI.    There is no height or weight on file to calculate BSA.    Physical Exam    Constitutional:  Normal appearance. NAD.   Skin: Skin is warm and dry.   Neurological:  Alert and oriented to person, place, and time.   Psychiatric: Mood normal.     Limited due to Virtual visit     Laboratory Data: reviewed most recent   Imaging: reviewed most recent      General Education discussed:      High risk patients:                JB (contrast enhanced mammogram):  JB is the main alternative for anyone who benefits by but cannot have a breast MRI with IV contrast.    Main indications:   High risk screening   Suspicious clinical symptoms with inconclusive mammogram and ultrasound   New breast cancer, evaluate extent of disease   Pre and post gloria-adjuvant systemic therapy evaluation     For high-risk screening, JB replaces the regular non-contrast mammogram and any other supplemental test         -RECOMMENDED LIFESTYLE MODIFICATIONS FOR HIGH RISK PATIENTS:    * Reviewed Lifestyle modifications which have shown benefit:  Limit alcohol consumption to less than 1 drink per day (1 ounce liquor, 6 oz wine, 8 oz beer) and nor more than 3 drinks per week.   Avoid smoking.  Exercise at least 150 minutes per week of moderate intensity aerobic activity or at least 75 minutes of vigorous activity. Exercise can lower the relative risk of breast cancer by ~18-20%.  Maintain healthy weight and avoid post-menopausal weight gain. Avoid processed foods and eat more lean proteins, fruits and vegetables.                               * Available resources include genetic counseling, nutrition, weight management.    -requirements for Bariatric surgery. BMI must be >40 with no comorbidities or 35> with at least two comorbidities pertaining obesity (Htn, type 2 diabetes, Meir, Osteoarthritis, and `  hyperlipidemia)          -CHEMOPREVENTION:              * For women at high risk for breast cancer, endocrine therapy can reduce the risk of invasive  and/or in situ breast cancers. (tamoxifen for premenopausal or postmenopausal women and raloxifene or exemestane for postmenopausal women).   -Above have been shown to lower the risk of breast cancer incidence, however there is no survival benefit in patients who don't have breast cancer.        Tamoxifen:    Data regarding tamoxifen risk reduction are limited to pre- and postmenopausal individuals ?35 years of age with a Ava Model 5-year breast cancer risk of ?1.7% or a 10-year risk by GHANSHYAM/Tyrer-Cuzicke of ?5% or a history of LCIS.  Tamoxifen: 20 mg per day for 5 years was shown to reduce risk of breast cancer by 49%. Among individuals with a history of AH, this dose and duration of tamoxifen were associated with an 86% reduction in breast cancer risk. Low-dose tamoxifen (5 mg per day for 3-5 years)d is an option if patient is symptomatic on the 20-mg dose or if patient is unwilling or unable to take standard-dose tamoxifen.1 This low dosage needs further investigation in premenopausal individuals.  The efficacy of tamoxifen risk reduction in individuals who are carriers of BRCA1/2 and other pathogenic mutations is less well studied than in other risk groups. Limited data suggest there may be a benefit, likely a larger benefit, for BRCA2 carriers.  For healthy, premenopausal individuals at elevated risk for breast cancer, data regarding the risk/benefit ratio for tamoxifen appear relatively favorable (category 1).  For postmenopausal individuals at elevated risk for breast cancer, data regarding the risk/benefit ratio for tamoxifen are influenced by age, presence of uterus, or comorbid conditions (category 1). There are insufficient data on ethnicity and rac  -Risks of Tamoxifen side effects include hot flashes, invasive endometrial cancer in women > 49 years of age (2.3/1000 compared to 0.9/1000), cataracts, increased risk of pulmonary embolism among others.    Raloxifene:    Data regarding raloxifene risk  reduction are limited to postmenopausal individuals ?35 years of age with a Ava Model 5-year breast cancer risk ?1.7% or a 10-year risk by GHANSHYAM/Tyrer-Cuzicke of ?5% or a history of LCIS.  Raloxifene: 60 mg per day was found to be equivalent to tamoxifen for breast cancer risk reduction in the initial comparison. While raloxifene in long-term follow-up appears to be less efficacious in risk reduction than tamoxifen, consideration of toxicity may still lead to the choice of raloxifene over tamoxifen in individuals with an intact uterus.  There are no data regarding the use of raloxifene in individuals who are carriers of BRCA1/2 and other pathogenic mutations or who have had prior thoracic radiation.  For postmenopausal individuals at elevated risk for breast cancer, data regarding the risk/benefit ratio for raloxifene are influenced by age or comorbid conditions (category 1). There are insufficient data on ethnicity and race.  Use of raloxifene for breast cancer risk reduction in premenopausal individuals is inappropriate unless part of a clinical trial.     Aromatase Inhibitors (exemestane and anastrozole)   Data regarding exemestane are from a single large randomized study limited to postmenopausal individuals ?35 years of age with a Ava Model 5-year breast cancer risk ?1.7% or a 10-year risk by GHANSHYAM/Tyrer-Cuzicke of ?5% or a history of LCIS.  Data regarding anastrozole are from a single large randomized study limited to postmenopausal individuals 40 to 70 years of age with the following risk compared with the general population: Aged 40 to 44 years - 4 times higher Aged 45 to 60 years - ?2 times higher Aged 60 to 70 years - ?1.5 times higher Individuals who did not meet these criteria but had a Tyrer-Cuzicke model 10-year breast cancer risk >5% were also included.  Exemestane: 25 mg per day was found to reduce the relative incidence of invasive breast cancer by 65% from 0.55% to 0.19% with a median follow-up of  3 years.  Anastrozole: 1 mg per day was found to reduce the relative incidence of breast cancer by 53% with a median follow-up of 5 years.  There are retrospective data that aromatase inhibitors can reduce the risk of contralateral breast cancer in BRCA1/2 patients with ER-positive breast cancer who take aromatase inhibitors as adjuvant agents.  For postmenopausal individuals at elevated risk for breast cancer, data regarding the risk/benefit ratio for aromatase inhibitor agents are influenced by age and comorbid conditions such as osteoporosis (category 1). There are insufficient data on ethnicity and race.  Use of aromatase inhibitors for breast cancer risk reduction in premenopausal individuals is inappropriate         Assessment:     1. At high risk for breast cancer    2. Family history of breast cancer in mother          Breast Cancer Risk Stratification   Current, Estimated Breast Cancer Risk Model Used Patient's Score Patient's Risk Category   5-year Ava Model 0.7%  [] N/A given age <35   [x] Average risk (<1.7%)   [] Increased risk (?1.7%)   Lifetime (to age 85) Guero-Pamela v8.0b 26.4%  [] Average risk (<15%)   [] Intermediate risk (?15% - <20%)   [x] High risk (?20%)   According to the American Cancer Society, patients with a lifetime breast cancer risk of 20% or higher might benefit from supplemental screening exams. Women with a 5-year risk greater than or equal to 1.7% may benefit from chemoprevention agents (tamoxifen, raloxifene, aromatase inhibitors) to reduce risk.        Plan:       Patient has opted out chemoprevention but will call in the future if she wishes to pursue.   Patient elects to proceed with alternating annual mammogram and annual breast MRI along with semiannual CBEs. She will alternate CBE here and with GYN/PCP.  Encouraged breast awareness, including monthly breast self-exams.   Recommend lifestyle modifications as above.     RTC in 1 year to see me either at [facility] or on 3rd  floor with a MRI breast a couple of days prior.     Questions were encouraged and answered to patient's satisfaction, and patient verbalized understanding of information and agreement with the plan. Advised patient to RTC with any interval changes or concerns.      Route Chart for Scheduling    Med Onc Chart Routing      Follow up with physician    Follow up with MIN 1 year.   Infusion scheduling note    Injection scheduling note    Labs    Imaging Mammogram   Will message Rajinder Agusto   Pharmacy appointment    Other referrals                    Therapy Plan Information  Medications  iron sucrose (VENOFER) 300 mg in sodium chloride 0.9% 250 mL IVPB  300 mg, Intravenous, 1 time a week  Anaphylaxis/Hypersensitivity  EPINEPHrine (EPIPEN) 0.3 mg/0.3 mL pen injection 0.3 mg  0.3 mg, Intramuscular, PRN  diphenhydrAMINE injection 50 mg  50 mg, Intravenous, PRN  hydrocortisone sodium succinate injection 100 mg  100 mg, Intravenous, PRN  Flushes  sodium chloride 0.9% 250 mL flush bag  Intravenous, 1 time a week  sodium chloride 0.9% flush 10 mL  10 mL, Intravenous, 1 time a week  heparin, porcine (PF) 100 unit/mL injection flush 500 Units  500 Units, Intravenous, 1 time a week  alteplase injection 2 mg  2 mg, Intra-Catheter, 1 time a week            Jessica Spangler, NP         50 minutes of total time spent on the encounter, which includes face to face time and non-face to face time preparing to see the patient (eg, review of tests), Obtaining and/or reviewing separately obtained history, Documenting clinical information in the electronic or other health record, Independently interpreting results (not separately reported) and communicating results to the patient/family/caregiver, or Care coordination (not separately reported).     The patient location is: her home  The chief complaint leading to consultation is: high risk for breast cancer    Visit type: audiovisual    Face to Face time with patient: 25  50 minutes of  total time spent on the encounter, which includes face to face time and non-face to face time preparing to see the patient (eg, review of tests), Obtaining and/or reviewing separately obtained history, Documenting clinical information in the electronic or other health record, Independently interpreting results (not separately reported) and communicating results to the patient/family/caregiver, or Care coordination (not separately reported).         Each patient to whom he or she provides medical services by telemedicine is:  (1) informed of the relationship between the physician and patient and the respective role of any other health care provider with respect to management of the patient; and (2) notified that he or she may decline to receive medical services by telemedicine and may withdraw from such care at any time.    Notes: see above

## 2024-08-13 NOTE — TELEPHONE ENCOUNTER
Spoke with patient regarding ultrasound results. Discussed removal/replacement. Will try to get scheduled with MD, could not see strings last exam.

## 2024-08-21 ENCOUNTER — OFFICE VISIT (OUTPATIENT)
Dept: DERMATOLOGY | Facility: CLINIC | Age: 39
End: 2024-08-21
Payer: COMMERCIAL

## 2024-08-21 DIAGNOSIS — D23.9 DERMATOFIBROMA: ICD-10-CM

## 2024-08-21 DIAGNOSIS — D22.9 MULTIPLE BENIGN MELANOCYTIC NEVI: ICD-10-CM

## 2024-08-21 DIAGNOSIS — D48.5 NEOPLASM OF UNCERTAIN BEHAVIOR OF SKIN: Primary | ICD-10-CM

## 2024-08-21 DIAGNOSIS — Z12.83 SCREENING EXAM FOR SKIN CANCER: ICD-10-CM

## 2024-08-21 PROCEDURE — 99213 OFFICE O/P EST LOW 20 MIN: CPT | Mod: 25,S$GLB,, | Performed by: DERMATOLOGY

## 2024-08-21 PROCEDURE — 1159F MED LIST DOCD IN RCRD: CPT | Mod: CPTII,S$GLB,, | Performed by: DERMATOLOGY

## 2024-08-21 PROCEDURE — 1160F RVW MEDS BY RX/DR IN RCRD: CPT | Mod: CPTII,S$GLB,, | Performed by: DERMATOLOGY

## 2024-08-21 PROCEDURE — 11102 TANGNTL BX SKIN SINGLE LES: CPT | Mod: S$GLB,,, | Performed by: DERMATOLOGY

## 2024-08-21 NOTE — PATIENT INSTRUCTIONS
Biopsy Wound Care Instructions    Leave the bandage on for 24 hours without getting it wet.   Clean the area once a day with a gentle soap and water, then pat dry and apply Vaseline and a bandaid.  The site should be kept moist with Vaseline at all times to improve healing. Reapply a thick coating as needed. Do not let the site air out or form a scab, as this will delay healing and worsen scarring.  If any bleeding or oozing occurs once you return home, apply firm pressure to the area for 30 minutes straight without peeking. If bleeding continues, call the office immediately.  Please message us via [facility], call us at [phone], or return to the office at any sign of increasing redness, swelling, tenderness, pain, heat, yellow drainage/discharge, or continued bleeding.      Receiving Your Pathology Results    Your pathology results will be released to you on [facility] at the same time that Dr. Robledo receives them.   Dr. Robledo will then message you with her interpretation of the results and/or with the plan going forward.  If you do not use [facility] or if your pathology results require more of an explanation, you will receive your results via a phone call.  If 2 weeks go by and you have not received your results, please message us via [facility] or call us at [phone] to inform us.                   Hydrocolloid Bandage    These bandages can be found at your local pharmacy in first [employer] or [employer].  Apply the bandage to clean, dry skin. Press and hold top of bandage once it is applied to warm the bandage.  Please note: There area where bandage adheres to wound bed will become white and puffy; this is not infection and a normal part of the healing process.  Do not change bandage until edges roll up.  When bandage is ready to be changed, remove carefully and slowly.  Wash wound with gentle cleanser and fingertips.  Make sure area is completely dry before applying new bandage.  Make sure wound  bed is in the center of the bandage.  Repeat process until fresh pink skin covers wound bed or until bandage no longer become white and puffy.

## 2024-08-21 NOTE — PROGRESS NOTES
"  Patient Information  Name: Flora Edmond  : 1985  MRN: [id number]     Referring Physician:  No ref. provider found   Primary Care Physician:  Glenn Vega MD   Date of Visit: 2024      Subjective:     History of Present lllness:    Flora Edmond is a 39 y.o. female who presents with a chief complaint of mole and bump.  Patient is here today for a "mole" check.     Today, patient complains of bump:  Location: right upper arm  Duration: months  Signs/Symptoms: skin-colored bump that she would like to have removed, no pain or itching  Exacerbating factors: none  Relieving factors/Prior treatments: none    Patient was last seen: 2022.  Prior notes by myself reviewed.   Clinical documentation obtained by nursing staff reviewed.    Review of Systems    Objective:   Physical Exam   Constitutional: She appears well-developed and well-nourished. No distress.   Neurological: She is alert and oriented to person, place, and time. She is not disoriented.   Psychiatric: She has a normal mood and affect.   Skin:   Areas Examined (abnormalities noted in diagram):   Head / Face Inspection Performed  Neck Inspection Performed  Chest / Axilla Inspection Performed  Abdomen Inspection Performed  Genitals / Buttocks / Groin Inspection Performed  Back Inspection Performed  RUE Inspected  LUE Inspection Performed  RLE Inspected  LLE Inspection Performed  Nails and Digits Inspection Performed                 Diagram Legend     Erythematous scaling macule/papule c/w actinic keratosis       Vascular papule c/w angioma      Pigmented verrucoid papule/plaque c/w seborrheic keratosis      Yellow umbilicated papule c/w sebaceous hyperplasia      Irregularly shaped tan macule c/w lentigo     1-2 mm smooth white papules consistent with Milia      Movable subcutaneous cyst with punctum c/w epidermal inclusion cyst      Subcutaneous movable cyst c/w pilar cyst      Firm pink to brown papule c/w dermatofibroma      " Pedunculated fleshy papule(s) c/w skin tag(s)      Evenly pigmented macule c/w junctional nevus     Mildly variegated pigmented, slightly irregular-bordered macule c/w mildly atypical nevus      Flesh colored to evenly pigmented papule c/w intradermal nevus       Pink pearly papule/plaque c/w basal cell carcinoma      Erythematous hyperkeratotic cursted plaque c/w SCC      Surgical scar with no sign of skin cancer recurrence      Open and closed comedones      Inflammatory papules and pustules      Verrucoid papule consistent consistent with wart     Erythematous eczematous patches and plaques     Dystrophic onycholytic nail with subungual debris c/w onychomycosis     Umbilicated papule    Erythematous-base heme-crusted tan verrucoid plaque consistent with inflamed seborrheic keratosis     Erythematous Silvery Scaling Plaque c/w Psoriasis     See annotation          [] Data reviewed  [] Prior external notes reviewed  [] Independent review of test  [] Management discussed with another provider  [] Independent historian    Assessment / Plan:      Pathology Orders:       Normal Orders This Visit    Specimen to Pathology, Dermatology     Questions:    Procedure Type: Dermatology and skin neoplasms    Number of Specimens: 1    ------------------------: -------------------------    Spec 1 Procedure: Biopsy    Spec 1 Clinical Impression: r/o dysplastic nevus    Spec 1 Source: left upper back    Release to patient:           Neoplasm of uncertain behavior of skin  -     Specimen to Pathology, Dermatology    Shave biopsy procedure note:  Risk, benefits, and alternatives of biopsy are discussed with the patient, including risk of infection, scar, recurrence, and need for additional treatment of site. The patient agrees to the procedure by verbal consent. The area is marked and prepped with alcohol.  Approximately 1 mL of lidocaine 1% with epinephrine is used for local anesthesia. A sharp blade is used to remove the lesion.  The specimen is sent for pathology. Hemostasis is obtained with aluminum chloride and/or monopolar hyfrecation if needed. The area is then dressed and bandaged. The patient tolerated the procedure well without adverse event. Written instructions on wound care were given and were reviewed with the patient, who is to call for any signs of bleeding or infection. The patient will be notified of the pathology results.    Dermatofibroma  These growths are benign bundles of scar tissue that can arise spontaneously or from minor trauma, such as a bug bite or a shaving nick. They are commonly found on the lower legs, arms above the elbows, and trunk.   Removal is not recommended as the lesion is just replaced with an additional scar; however, if it is symptomatic, removal can be considered. Lesions can also recur following removal.    Multiple benign melanocytic nevi  Multiple benign-appearing nevi present on exam today. Reassurance provided. Counseled patient to periodically examine moles and return to clinic if any changes in size, shape, or color are noted or if it becomes symptomatic (bleeding, itching, pain, etc).  Recommend using a broad-spectrum, water-resistant sunscreen with SPF of 30 or higher--reapply every 2 hours. Seek shade, wear sun-protective clothing, and perform regular skin self-exams.    Screening exam for skin cancer  Total body skin examination performed today as noted in physical exam. Suspicious lesion(s) were noted and/or biopsied as above.  Recommend using a broad-spectrum, water-resistant sunscreen with SPF of 30 or higher--reapply every 2 hours. Seek shade, wear sun-protective clothing, and perform regular skin self-exams.         Follow up in about 1 year (around 8/21/2025) for TBSE, or sooner dependent on pathology results.      Mesha Robledo MD, FAAD  [facility]

## 2024-08-22 ENCOUNTER — TELEPHONE (OUTPATIENT)
Dept: OPTOMETRY | Facility: CLINIC | Age: 39
End: 2024-08-22
Payer: COMMERCIAL

## 2024-08-26 ENCOUNTER — TELEPHONE (OUTPATIENT)
Dept: RADIOLOGY | Facility: HOSPITAL | Age: 39
End: 2024-08-26
Payer: COMMERCIAL

## 2024-08-26 ENCOUNTER — OFFICE VISIT (OUTPATIENT)
Dept: HEMATOLOGY/ONCOLOGY | Facility: CLINIC | Age: 39
End: 2024-08-26
Payer: COMMERCIAL

## 2024-08-26 DIAGNOSIS — Z80.3 FAMILY HISTORY OF BREAST CANCER IN MOTHER: ICD-10-CM

## 2024-08-26 DIAGNOSIS — Z91.89 AT HIGH RISK FOR BREAST CANCER: Primary | ICD-10-CM

## 2024-08-26 NOTE — TELEPHONE ENCOUNTER
[facility]. Patient was instructed on fasting for the contrast mammogram, nothing to eat or drink 2 hours prior to the contrast mammogram.

## 2024-08-27 NOTE — PROGRESS NOTES
Please let patient know that the path results showed a severely atypical nevus which requires a surgical excision for definitive treatment.   Please schedule procedure and review pre- and post-op instructions with her.  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

## 2024-08-29 ENCOUNTER — PATIENT MESSAGE (OUTPATIENT)
Dept: DERMATOLOGY | Facility: CLINIC | Age: 39
End: 2024-08-29
Payer: COMMERCIAL

## 2024-08-29 ENCOUNTER — TELEPHONE (OUTPATIENT)
Dept: DERMATOLOGY | Facility: CLINIC | Age: 39
End: 2024-08-29
Payer: COMMERCIAL

## 2024-08-29 NOTE — TELEPHONE ENCOUNTER
Procedure scheduled----- Message from Magda Perez sent at 8/29/2024  2:35 PM CDT -----  Name of Who is Calling: J CARLOS STOREY [[id number]]          What is the request in detail: Pt is requesting a call back regarding getting scheduled for a procedure. Please assist\.           Can the clinic reply by [facility]: No          [facility] [phone]

## 2024-09-18 ENCOUNTER — PATIENT MESSAGE (OUTPATIENT)
Dept: SURGERY | Facility: CLINIC | Age: 39
End: 2024-09-18
Payer: COMMERCIAL

## 2024-09-19 ENCOUNTER — PATIENT MESSAGE (OUTPATIENT)
Dept: PRIMARY CARE CLINIC | Facility: CLINIC | Age: 39
End: 2024-09-19
Payer: COMMERCIAL

## 2024-09-24 ENCOUNTER — TELEPHONE (OUTPATIENT)
Dept: ENDOSCOPY | Facility: HOSPITAL | Age: 39
End: 2024-09-24
Payer: COMMERCIAL

## 2024-09-24 VITALS — BODY MASS INDEX: 24.16 KG/M2 | WEIGHT: 145 LBS | HEIGHT: 65 IN

## 2024-09-24 DIAGNOSIS — Z85.038 HISTORY OF COLON CANCER: Primary | ICD-10-CM

## 2024-09-24 DIAGNOSIS — Z12.11 ENCOUNTER FOR SCREENING COLONOSCOPY: Primary | ICD-10-CM

## 2024-09-24 RX ORDER — SOD SULF/POT CHLORIDE/MAG SULF 1.479 G
12 TABLET ORAL DAILY
Qty: 24 TABLET | Refills: 0 | Status: SHIPPED | OUTPATIENT
Start: 2024-09-24

## 2024-09-24 NOTE — TELEPHONE ENCOUNTER
"----- Message from Juana Fernandez sent at 9/19/2024  8:43 AM CDT -----  Regarding: FW: Colonoscopy scheduling    ----- Message -----  From: Amanda Fields RN  Sent: 9/18/2024   1:56 PM CDT  To: [facility]  Subject: Colonoscopy scheduling                           Procedure: Colonoscopy    Diagnosis: Surveillance colonoscopy - Hx of colon cancer    Procedure Timing: Within 4 weeks (Urgent)    #If within 4 weeks selected, please lashae as high priority#    #If greater than 12 weeks, please select "5-12 weeks" and delay sending until 3 months prior to requested date#     Provider: Dr. Alda Partida    Location: Any Site    Additional Scheduling Information: No scheduling concerns    Prep Specifications:Standard prep    Is the patient taking a GLP-1 Agonist:no    Have you attached a patient to this message: yes    Looks like an order dated 7/2024 may be in from Dr. Vega. If so, please disregard.  Pt is to have c-scope every 1 year. Please assist with scheduling. Thank you!    - Amanda"

## 2024-09-24 NOTE — TELEPHONE ENCOUNTER
Spoke to Trice to schedule procedure(s) Colonoscopy       Physician to perform procedure(s) Dr. BETHANY Partida  Date of Procedure (s) 10/25/24  Arrival Time 10:30 AM  Time of Procedure(s) 11:30 AM   Location of Procedure(s) [facility] [facility]  Type of Rx Prep sent to patient: Sutab  Instructions provided to patient via [facility]    Patient was informed on the following information and verbalized understanding. Screening questionnaire reviewed with patient and complete. If procedure requires anesthesia, a responsible adult needs to be present to accompany the patient home, patient cannot drive after receiving anesthesia. Appointment details are tentative, especially check-in time. Patient will receive a prep-op call 7 days prior to confirm check-in time for procedure. If applicable the patient should contact their pharmacy to verify Rx for procedure prep is ready for pick-up. Patient was advised to call the scheduling department at [phone] if pharmacy states no Rx is available. Patient was advised to call the endoscopy scheduling department if any questions or concerns arise.      [facility] Scheduling Department

## 2024-09-24 NOTE — TELEPHONE ENCOUNTER
Contacted the patient to schedule an endoscopy procedure(s) 9/23/24. The patient did not answer the call and left a voice message requesting a call back.

## 2024-09-24 NOTE — TELEPHONE ENCOUNTER
"----- Message from Juana Fernandez sent at 9/19/2024  8:43 AM CDT -----  Regarding: FW: Colonoscopy scheduling    ----- Message -----  From: Amanda Fields RN  Sent: 9/18/2024   1:56 PM CDT  To: [facility]  Subject: Colonoscopy scheduling                           Procedure: Colonoscopy    Diagnosis: Surveillance colonoscopy - Hx of colon cancer    Procedure Timing: Within 4 weeks (Urgent)    #If within 4 weeks selected, please lashae as high priority#    #If greater than 12 weeks, please select "5-12 weeks" and delay sending until 3 months prior to requested date#     Provider: Dr. Alda Partida    Location: Any Site    Additional Scheduling Information: No scheduling concerns    Prep Specifications:Standard prep    Is the patient taking a GLP-1 Agonist:no    Have you attached a patient to this message: yes    Looks like an order dated 7/2024 may be in from Dr. Vega. If so, please disregard.  Pt is to have c-scope every 1 year. Please assist with scheduling. Thank you!    - Amanda"

## 2024-09-25 ENCOUNTER — PROCEDURE VISIT (OUTPATIENT)
Dept: OBSTETRICS AND GYNECOLOGY | Facility: CLINIC | Age: 39
End: 2024-09-25
Payer: COMMERCIAL

## 2024-09-25 VITALS
BODY MASS INDEX: 24.12 KG/M2 | HEIGHT: 65 IN | WEIGHT: 144.81 LBS | SYSTOLIC BLOOD PRESSURE: 136 MMHG | DIASTOLIC BLOOD PRESSURE: 86 MMHG

## 2024-09-25 DIAGNOSIS — Z12.4 SCREENING FOR CERVICAL CANCER: ICD-10-CM

## 2024-09-25 DIAGNOSIS — Z30.432 ENCOUNTER FOR IUD REMOVAL: ICD-10-CM

## 2024-09-25 DIAGNOSIS — Z30.430 ENCOUNTER FOR IUD INSERTION: Primary | ICD-10-CM

## 2024-09-25 LAB
B-HCG UR QL: NEGATIVE
CTP QC/QA: YES

## 2024-09-25 PROCEDURE — 99499 UNLISTED E&M SERVICE: CPT | Mod: S$GLB,,, | Performed by: STUDENT IN AN ORGANIZED HEALTH CARE EDUCATION/TRAINING PROGRAM

## 2024-09-25 PROCEDURE — 81025 URINE PREGNANCY TEST: CPT | Mod: S$GLB,,, | Performed by: STUDENT IN AN ORGANIZED HEALTH CARE EDUCATION/TRAINING PROGRAM

## 2024-09-25 PROCEDURE — 58300 INSERT INTRAUTERINE DEVICE: CPT | Mod: S$GLB,,, | Performed by: STUDENT IN AN ORGANIZED HEALTH CARE EDUCATION/TRAINING PROGRAM

## 2024-09-25 PROCEDURE — 58301 REMOVE INTRAUTERINE DEVICE: CPT | Mod: S$GLB,,, | Performed by: STUDENT IN AN ORGANIZED HEALTH CARE EDUCATION/TRAINING PROGRAM

## 2024-09-25 PROCEDURE — 88175 CYTOPATH C/V AUTO FLUID REDO: CPT | Performed by: STUDENT IN AN ORGANIZED HEALTH CARE EDUCATION/TRAINING PROGRAM

## 2024-09-25 NOTE — PROCEDURES
Removal and Insertion of Intrauterine Device    Date/Time: 9/25/2024 11:15 AM    Performed by: Mindy Portillo MD  Authorized by: Mindy Portillo MD    Consent:     Consent given by:  Parent/ guardian and patient    Procedure risks and benefits discussed: yes      Patient questions answered: yes      Patient agrees, verbalizes understanding, and wants to proceed: yes     Device to be inserted was verified by patient: yes  Removal Procedure:    IUD grasped by: forceps   Removed with no complications: IUD removal not due to complications   Removal due to mechanical complications: no  no   Removed due to expiration: Removal due to expiration  Insertion Procedure:   1 Intra Uterine Device levonorgestreL 52 mg       Negative urine pregnancy test: yes      Cervix cleaned and prepped: yes      Speculum placed in vagina: yes      Tenaculum applied to cervix: no      Uterus sounded: yes      Uterus sound depth (cm):  7    IUD inserted with no complications: yes      Strings trimmed: yes    Post-procedure:     Patient tolerated procedure well: yes      Patient will follow up after next period: yes        PROCEDURE: IUD REMOVAL AND REPLACEMENT    PRE-IUD REMOVAL COUNSELING:  The patient was advised of minimal risks of bleeding and pain and she agrees to proceed. Strings not visualized on her last several exams but US confirmed intrauterine placement. Concern for one arm being embedded, discussed that if unable to be removed with gentle traction would recommend hysteroscopy for removal. Risk of fracturing IUD during removal process also discussed.    The patient's history was reviewed and there are no contraindications to another IUD. The procedure and minimal risks of pain, bleeding, perforation and infection at the insertion and spontaneous expulsion within the first two weeks was discussed. The benefits of amenorrhea and no systemic side effects were explained. All questions were answered and the patient agrees to  proceed. Consent was signed (scanned into computer).    PROCEDURE:  TIME OUT PERFORMED.  IUD strings were unable to be visualized at the os. Confirmed UPT was negative. With IUD grasper, the strings were able to be teased from within the endocervical canal. Gentle traction was then applied and the IUD was removed with gentle traction. It was confirmed to be intact with no concern for retained fragment.     The cervix was then swabbed with betadine and the uterus sounded to 7 cm using sterile technique.  A mirena IUD was loaded and placed high in uterine fundus without difficulty using sterile technique.  The string was cut to 2cm length from exo cervix.  The tenaculum and speculum were removed. The patient tolerated the procedure well.    ASSESSMENT:  1. Contraception management / IUD insertion.    POST IUD PLACEMENT COUNSELING:  Manage post IUD placement pain with NSAIDs, Tylenol or Rx per [employer].  IUD danger signs and how to check the strings.  Removal in 5-8 years.    Counseling lasted approximately 15 minutes and all her questions were answered. Pap was also collected today as most recent pap was unsatisfactory. HPV was negative.    FOLLOW-UP: With me in 4-6 weeks.      LOT NUMBER: [id number]  EXPIRATION: 10/1/2026        Mindy Portillo MD

## 2024-09-26 ENCOUNTER — PROCEDURE VISIT (OUTPATIENT)
Dept: DERMATOLOGY | Facility: CLINIC | Age: 39
End: 2024-09-26
Payer: COMMERCIAL

## 2024-09-26 DIAGNOSIS — D48.5 NEOPLASM OF UNCERTAIN BEHAVIOR OF SKIN: Primary | ICD-10-CM

## 2024-09-26 LAB
CLINICAL INFO: NORMAL
DATE OF PREVIOUS PAP: NORMAL
DATE PREVIOUS BX: NO
LMP START DATE: NORMAL
SPECIMEN SOURCE CVX/VAG CYTO: NORMAL

## 2024-09-26 NOTE — PATIENT INSTRUCTIONS
POST-OPERATIVE INSTRUCTIONS    After the procedure a pressure bandage will be applied to the incision. Leave this bandage in place for 2 days. DO NOT GET THE BANDAGE WET FOR 2 DAYS. After 2 days, remove the bandage in the shower or bath so that it will come off easier.     Start cleaning the area once or twice a day with Hibiclens and water. Gently dry the area, and then apply a thick layer of Vaseline to completely cover the sutures, followed by a dressing. Non-stick gauze and tape work well for larger areas that cannot fit under a bandaid.    The incision should be kept moist with Vaseline at all times to improve healing. Do not let the incision air out or form a scab, as this will delay healing and worsen scarring.    Minimize exercise for at least one week, and avoid anything that pulls or stretches the wound.    If any bleeding or oozing occurs once you return home, apply firm pressure to the area for 30 minutes straight without peeking. If bleeding continues, call the office immediately.    Please call [phone] or return to the office at any sign of increasing redness, swelling, tenderness, pain, heat, yellow drainage/discharge, or continued bleeding.

## 2024-09-26 NOTE — PROGRESS NOTES
PROCEDURE NOTE: EXCISION WITH COMPLEX REPAIR    DIAGNOSIS: Neoplasm of uncertain behavior of skin  - COMPOUND MELANOCYTIC NEVUS WITH MODERATE-TO-SEVERE ARCHITECTURAL AND CYTOLOGIC ATYPIA     LOCATION: left upper back     ASSISTANT: Isa Goldsmith LPN    ANESTHESIA:  1% Lidocaine with Epinephrine and Sodium bicarbonate, 9 mL    PREPARATION: The diagnosis, procedure, alternatives, benefits and risks, including but not limited to: infection, bleeding/bruising, scar or cosmetic defect, local sensation disturbances, wound dehiscence (separation of wound edges after sutures removed) and/or recurrence of present condition were explained to the patient. The patient elected to proceed with the procedure by signing an informed consent.  Patient's identity was verified, and the site was verified.    PROCEDURE: Lesional tissue was carefully marked with at least 3 mm margins of clinically normal skin in all directions. The area to be anesthetized was cleaned with Hibiclens solution, injected with local anesthesia, and again prepped with Hibiclens and draped in sterile fashion to produce a suitable field for surgery. A fusiform excision was performed with a #10 blade carried down completely through the dermis into the subcutaneous tissue, and dissection was carried out in that plane. The specimen of tissue was removed to be sent for histologic confirmation. Bleeding points were stopped with monopolar hyfrecation or tied with absorbable suture as needed throughout the procedure to maintain hemostasis.     CLOSURE: The wound was extensively undermined to a distance greater than the maximum width of the defect (> 1.7 cm) along at least one edge of the defect to mobilize tissue and to reduce tension on the sutured wound edges.  2-0 PDS II absorbable suture was used in a layered fashion closure with fascial plication to reduce tension on the wound, as well as with buried vertical mattress sutures through dermis and subcutaneous  tissue to close the wound and to mendez the cutaneous wound edge. This allows for preservation of structure and function of surrounding anatomy. The epidermis and dermis were sutured with 3-0 Ethilon in an interrupted fashion.     SIZE OF DEFECT: 1.7 x 1.6 cm    LENGTH OF REPAIR: 4.5 cm     The area was then cleaned, and a sterile pressure dressing applied. The procedure was tolerated well without adverse event and negligible blood loss. The patient was discharged in stable condition.     Post op instructions: the patient was verbally (and in writing) educated on care of the wound and told to call or return for bleeding, tenderness, redness, etc. Patient should use acetaminophen 1000 mg every 8 hours for pain relief if needed.     Suture removal in 10-14 day(s).

## 2024-10-04 ENCOUNTER — HOSPITAL ENCOUNTER (OUTPATIENT)
Dept: RADIOLOGY | Facility: HOSPITAL | Age: 39
Discharge: HOME | End: 2024-10-04
Attending: NURSE PRACTITIONER
Payer: COMMERCIAL

## 2024-10-04 ENCOUNTER — CLINICAL SUPPORT (OUTPATIENT)
Dept: DERMATOLOGY | Facility: CLINIC | Age: 39
End: 2024-10-04
Payer: COMMERCIAL

## 2024-10-04 DIAGNOSIS — Z91.89 AT HIGH RISK FOR BREAST CANCER: ICD-10-CM

## 2024-10-04 DIAGNOSIS — Z48.02 VISIT FOR SUTURE REMOVAL: Primary | ICD-10-CM

## 2024-10-04 DIAGNOSIS — Z80.3 FAMILY HISTORY OF BREAST CANCER IN MOTHER: ICD-10-CM

## 2024-10-04 PROCEDURE — 25500020 PHARM REV CODE 255: Performed by: NURSE PRACTITIONER

## 2024-10-04 PROCEDURE — 77067 SCR MAMMO BI INCL CAD: CPT | Mod: TC

## 2024-10-04 RX ADMIN — IOHEXOL 100 ML: 350 INJECTION, SOLUTION INTRAVENOUS at 02:10

## 2024-10-04 NOTE — PROGRESS NOTES
Patient Information  Name: Flora Edmond  : 1985  MRN: [id number]     Suture Removal note:  CC: 39 y.o. female patient is here for suture removal.    HPI: Patient is s/p excision of moderately to severely atypical nevus from the left upper back on 2024.  Patient reports no problems.    WOUND PE:  Sutures intact.  Wound healing well.  Good approximation of skin edges.  No signs or symptoms of infection.    IMPRESSION:    - CHANGES CONSISTENT WITH THE PRIOR PROCEDURE, NEGATIVE FOR RESIDUAL MELANOCYTIC NEOPLASM     PLAN:  Sutures removed today.  Continue wound care.

## 2024-10-09 ENCOUNTER — CLINICAL SUPPORT (OUTPATIENT)
Dept: REHABILITATION | Facility: OTHER | Age: 39
End: 2024-10-09
Payer: COMMERCIAL

## 2024-10-09 DIAGNOSIS — R10.2 PAIN OF PELVIC GIRDLE: ICD-10-CM

## 2024-10-09 DIAGNOSIS — M62.89 PELVIC FLOOR DYSFUNCTION: Primary | ICD-10-CM

## 2024-10-09 DIAGNOSIS — R27.8 COORDINATION IMPAIRMENT: ICD-10-CM

## 2024-10-09 DIAGNOSIS — R35.0 URINARY FREQUENCY: ICD-10-CM

## 2024-10-09 PROCEDURE — 97530 THERAPEUTIC ACTIVITIES: CPT | Mod: PN | Performed by: PHYSICAL THERAPIST

## 2024-10-09 PROCEDURE — 97161 PT EVAL LOW COMPLEX 20 MIN: CPT | Mod: PN | Performed by: PHYSICAL THERAPIST

## 2024-10-09 NOTE — PLAN OF CARE
[facility]   Pelvic Health Physical Therapy Initial Evaluation      Date: 10/9/2024   Name: Flora Edmond  Clinic Number: [id number]    Therapy Diagnosis:   Encounter Diagnosis   Name Primary?    Urinary frequency      Referring Provider: Alyce Ramsey NP    Referring Provider Orders: PT Eval and Treat  Medical Diagnosis from Referral:   1. Urinary frequency      Evaluation Date: 10/9/2024  Authorization Period Expiration: 12/31/2024  Plan of Care Expiration: 1/9/2025  Visit # / Visits authorized: 1/pending  FOTO: 1 (10/9/2024)    Precautions: standard    Time In: ***  Time Out: ***  Total Appointment Time (timed & untimed codes): 45 minutes    SUBJECTIVE     Date of onset: ***  History of current condition: Trice reports bothersome urinary frequency. She also notes fecal urgency (mostly with loose stool) since surgery. She notes sensitivity to caffeine.  Chronic sacroiliac joint pain, switches side to side but currently on L. Also chronic lower back pain.  Colon resection for colon cancer last year (low transverse incision + laparoscopic incisions)    OB/GYN HISTORY - G0    BLADDER HISTORY  Frequency of urination:   Day: every 15 minutes to every 2-3 hours           Night: 0x  Difficulty initiating urine stream: No  Hover over toilet seats: No  Complete emptying: Unknown  Urinary Urgency: No  Bladder leakage: Yes - with big sneeze (better with bracing)  Frequency of incidents: rare    BOWEL HISTORY  Frequency of bowel movements: 2-3 times a day + some false alarms  Difficulty initiating BM: Yes (typically with firmer stool)  Quality/shape of BM: [place] Stool Chart 3 or 6  Incomplete emptying? Yes  Fiber supplements, probiotics or laxative use? Yes - vaginal probiotic  Colon leakage: Yes - following bowel movements, with passing gas  Frequency of incidents: rare   Amount leaked (bowels): very small amount    SEXUAL/PELVIC PAIN HISTORY  Sexually active? Yes   Pain with vaginal  "exams, intercourse or tampon use? Yes - deep penetration (depending on partner), sensitive to condoms/semen  Pain with wearing certain clothes, sitting on certain surfaces? No  Feeling of pelvic heaviness? No    Pain:  Location: {Pain Loc:70958}  Current: {0-10:60912::"0"}/10, worst: {0-10:98475::"0"}/10, best: {0-10:30441::"0"}/10   Description: {Pain Description:74713}  Aggravating Factors/Activities that cause symptoms: {Aggravating Factors:61785}   Easing Factors: {Pain (activities that relieve):61907}    Imaging: none pertinent to the pelvic floor    Prior Therapy: no prior pelvic floor therapy  Social History: lives in a house with steps to enter, with family ***  Current exercise: none (has gym membership)  Occupation: ***  Prior Level of Function: no pelvic floor dysfunction  Current Level of Function: ***    Types of fluid intake: coffee (1 cup per day, 3x/week), not enough water, wine  Diet: unremarkable  Habitus: well developed, well nourished  Abuse/Neglect: No***     Patients goals: ***     Medical History: Trice  has a past medical history of Allergy, BRCA1 negative (2023), BRCA2 negative (2023), Cervical rib, Colon cancer (), and Vitamin D deficiency.     Surgical History: Flora Edmond  has a past surgical history that includes [place] tooth extraction (); Colonoscopy (N/A, 2023); and Colonoscopy (N/A, 10/12/2023).    Medications: Flora has a current medication list which includes the following prescription(s): atovaquone-proguanil, levonorgestrel, and sutab, and the following Facility-Administered Medications: levonorgestrel.    Allergies:   Review of patient's allergies indicates:   Allergen Reactions    Cat/feline products Itching        OBJECTIVE     See Electronic Medical Record under MEDIA for written consent provided 10/9/2024  Chaperone: declined    ORTHO SCREEN  Posture in sitting: {AMB PT PELVIC FLOOR ORTHO POSTURE SITTIN}  Posture in standing: " {AMB PT PELVIC FLOOR ORTHO POSTURE STANDIN}  Squat: {sehrtsquat:66100}  Lumbar spine: ***  Palpation: ***  Standing load transfer: *** pelvic girdle stability with single-leg balance    Hip Strength 10/9/2024   R hip flexion ***/5   R hip external rotation ***/5   L hip flexion ***/5   L hip external rotation ***/5     Hip Range of Motion 10/9/2024   R internal rotation WNL   L internal rotation WNL     ABDOMINAL WALL ASSESSMENT  Palpation: {AMB PT PELVIC FLOOR MUSCLE PALPATION:63620}  Pelvic Girdle Stability: Pt demonstrates {mildly/moderately/severely:96630} impaired ability to stabilize pelvis with Active Straight Leg Raise Test. Able to improve {Desc; slightly/moderately/markedly:72808} with verbal/manual cues for transverse abdominus activation.   Scarring: ***  Diastasis Recti: {PRESENT/ABSENT:84359}, {1,2,3,4:63096}-finger width, present with supine curl-up task  Motor Control: able to demonstrate appropriate transverse abdominis contraction on command    BREATHING MECHANICS ASSESSMENT   Thorax Assessment During Quiet Respiration: {AMB PT PELVIC FLOOR BREATHIN}  Thorax Assessment During Deep Respiration: {AMB PT PELVIC FLOOR BREATHIN}        Limitation/Restriction for FOTO Pelvic Floor Surveys    Therapist reviewed FOTO scores for Flora Edmond on 10/9/2024  FOTO documents entered into [employer] - see Media section.    Limitation Score:   ***% impairment     TREATMENT     Total Treatment time (time-based codes) separate from the evaluation: *** minutes     Therapeutic activities to improve functional performance for *** minutes -  [x] Education on pelvic floor anatomy, function, and assessment  [x] Education on normal void intervals and fluid intake  [x] Education on bladder irritants  [x] Education on basic constipation management  [x] Instruction on self-release to superficial PFM to reduce discomfort with initial penetration - pt able to return demonstrate independently after initial  instruction  [x] Discussion of intercourse considerations for pelvic pain - lubricant, positioning, pillows, pelvic floor massage  [x] Instruction on urge suppression techniques  [x] Instruction on diaphragmatic breathing and hip-opening stretches for pelvic floor relaxation  [x] Education on visual cues/mental imagery for pelvic floor relaxation  [x] Education on techniques to reduce post-void dribble  [x] Instruction on the Knack for reduction of stress urinary incontinence  [x] Instruction on log-roll technique for transfers to reduce strain on back/abdominal wall, reduce incidence of incontinence  [x] Instruction on pressure management to protect pelvic organ prolapse/prevent incontinence  [x] Education on voiding optimization - seated on toilet, no pushing, pelvic floor squeeze upon completion  [x] Education on bowel movement optimization - toilet stool, breath coordination, pelvic floor relaxation, abdominal wall bracing  [x] HEP building/HEP review    PATIENT EDUCATION AND HOME EXERCISES     Education provided: general anatomy/physiology of urinary & bowel system, benefits of treatment, and alternative methods of treatment were discussed with the patient. Additionally, Flora was provided education on pt prognosis, PT plan of care, {[facility]ation:27160}    Written Home Exercises provided: yes  Exercises were reviewed, and Trice was able to demonstrate them prior to the end of the session. Trice demonstrated good understanding of the education provided. See EMR under 'Patient Instructions' for exercises and education provided during session.    ASSESSMENT     Trice is a 39 y.o. female referred to outpatient physical therapy with a medical diagnosis of *** and additional complaints of ***. Symptoms impair the patient's ability to perform ADLs and functional mobility, as well as remain continent.  No direct pelvic floor examination performed today, but pt presentation and subjective report suggest pelvic  floor dysfunction, ***.  Pt presents with {AMB PT PELVIC FLOOR PROBLEM LIST:11530}.  Symptoms appear consistent with pelvic floor dysfunction, ***.   Pt will benefit from {benefitlist:19926}    Patient prognosis is good  Trice will benefit from skilled outpatient physical therapy to address the deficits stated above and in the chart below, provide patient/family education, and to maximize the patient's level of independence.     Plan of care discussed with patient: yes  Patient's spiritual, cultural and educational needs considered, and the patient is agreeable to the plan of care and goals as stated below:     Anticipated Barriers for therapy: none    Medical Necessity is demonstrated by the following -  History  Co-morbidities and personal factors that may impact the plan of care [x] LOW: no personal factors / co-morbidities  [] MODERATE: 1-2 personal factors / co-morbidities  [] HIGH: 3+ personal factors / co-morbidities    Moderate / High Support Documentation:   Co-morbidities affecting plan of care: ***    Personal Factors:   {Personal Factors:45420}     Examination  Body Structures and Functions, activity limitations and participation restrictions that may impact the plan of care [x] LOW: addressing 1-2 elements  [] MODERATE: 3+ elements  [] HIGH: 4+ elements (please support below)    Moderate / High Support Documentation: ***     Clinical Presentation [x] LOW: stable  [] MODERATE: Evolving  [] HIGH: Unstable     Decision Making/ Complexity Score: {Desc; low/moderate/high:186087}       Short Term Goals: 6 weeks   {STG Urinary:76418}   {STG Urinary:37531}   {STG Urinary:11115}   Pt to be independent with introductory home exercise program     Long Term Goals: 12 weeks   {LTG Urinary:67653}   {LTG Urinary:60921}   {LTG Urinary:30939}   Pt to score no more than ***% impairment on the *** FOTO survey to demonstrate reduced impairment due to pelvic floor dysfunction   Pt to be independent with advanced home  exercise program      PLAN     Plan of Care certification: 10/9/2024 to 1/9/2025    Outpatient Physical Therapy - 1 time per week for 12 weeks to include the following interventions: Therapeutic Exercise, Manual Therapy, Therapeutic Activity, Neuromuscular Re-education, Electrical Stimulation Unattended, Self-Care, Patient Education      Charlotte Zaman, PT, DPT  Board-Certified Clinical Specialist in Women's Health Physical Therapy          I CERTIFY THE NEED FOR THESE SERVICES FURNISHED UNDER THIS PLAN OF TREATMENT AND WHILE UNDER MY CARE   Physician's comments:     Physician's Signature: ___________________________________________________

## 2024-10-09 NOTE — PLAN OF CARE
[facility]   Pelvic Health Physical Therapy Initial Evaluation      Date: 10/9/2024   Name: Flora Edmond  Clinic Number: [id number]    Therapy Diagnosis:   Encounter Diagnoses   Name Primary?    Urinary frequency     Pelvic floor dysfunction Yes    Coordination impairment     Pain of pelvic girdle      Referring Provider: Alyce Ramsey NP    Referring Provider Orders: PT Eval and Treat  Medical Diagnosis from Referral: Urinary frequency [R35.0]   Evaluation Date: 10/9/2024  Authorization Period Expiration: 12/31/2024  Plan of Care Expiration: 1/9/2025  Visit # / Visits authorized: 1/pending  FOTO: 1 (10/9/2024)    Precautions: standard    Time In: 13:25  Time Out: 14:00  Total Appointment Time (timed & untimed codes): 35 minutes    SUBJECTIVE     Date of onset: in the last few years  History of current condition: Trice reports bothersome urinary frequency. She also notes fecal urgency (mostly with loose stool) since surgery. She notes sensitivity to caffeine. Endorses just-in-case voids.  Chronic sacroiliac joint pain, switches side to side but currently on L. Also chronic lower back pain.  Colon resection for colon cancer last year (low transverse incision + laparoscopic incisions)    OB/GYN HISTORY - G0    BLADDER HISTORY  Frequency of urination:   Day: every 15 minutes to every 2-3 hours           Night: 0x  Difficulty initiating urine stream: No  Hover over toilet seats: No  Complete emptying: Unknown  Urinary Urgency: No  Bladder leakage: Yes - with big sneeze (better with bracing)  Frequency of incidents: rare    BOWEL HISTORY  Frequency of bowel movements: 2-3 times a day + some false alarms  Difficulty initiating BM: Yes (typically with firmer stool)  Quality/shape of BM: [place] Stool Chart 3 or 6  Incomplete emptying? Yes  Fiber supplements, probiotics or laxative use? Yes - vaginal probiotic  Colon leakage: Yes - following bowel movements, with passing gas  Frequency of  incidents: rare   Amount leaked (bowels): very small amount    SEXUAL/PELVIC PAIN HISTORY  Sexually active? Yes   Pain with vaginal exams, intercourse or tampon use? Yes - deep penetration (depending on partner), sensitive to condoms/semen  Pain with wearing certain clothes, sitting on certain surfaces? No  Feeling of pelvic heaviness? No    Pain: chronic, intermittent SI pain, switching between R and L    Imaging: none pertinent to the pelvic floor    Prior Therapy: no prior pelvic floor therapy  Social History: lives in a house with steps to enter  Current exercise: none (has gym membership)  Prior Level of Function: no pelvic floor dysfunction  Current Level of Function: bothersome urinary frequency and lower back/SI pain    Types of fluid intake: coffee (1 cup per day, 3x/week), not enough water, wine  Diet: unremarkable  Habitus: well developed, well nourished  Abuse/Neglect: none reported    Patients goals: improve urinary frequency     Medical History: Trice  has a past medical history of Allergy, BRCA1 negative (August 2023), BRCA2 negative (August 2023), Cervical rib, Colon cancer (July 1st), and Vitamin D deficiency.     Surgical History: Flora Edmond  has a past surgical history that includes [place] tooth extraction (2007); Colonoscopy (N/A, 6/30/2023); and Colonoscopy (N/A, 10/12/2023).    Medications: Flora has a current medication list which includes the following prescription(s): atovaquone-proguanil, levonorgestrel, and sutab, and the following Facility-Administered Medications: levonorgestrel.    Allergies:   Review of patient's allergies indicates:   Allergen Reactions    Cat/feline products Itching        OBJECTIVE     Limitation/Restriction for FOTO Pelvic Floor Surveys    Therapist reviewed FOTO scores for Flora Edmond on 10/9/2024  FOTO documents entered into [employer] - see Media section.    Limitation Score:        TREATMENT     Total Treatment time (time-based codes) separate from  the evaluation: 12 minutes     Therapeutic activities to improve functional performance for 12 minutes -  [x] Education on pelvic floor anatomy, function, and assessment  [x] Education on normal void intervals and fluid intake  [x] Education on bladder irritants  [x] Instruction on urge suppression techniques  [x] Education on bowel movement optimization - toilet stool  [x] HEP building/HEP review    PATIENT EDUCATION AND HOME EXERCISES     Education provided: general anatomy/physiology of urinary & bowel system, benefits of treatment, and alternative methods of treatment were discussed with the patient. Additionally, Flora was provided education on pt prognosis, PT plan of care, pelvic floor anatomy & function, consequences of elevated pelvic floor muscle tension, relationship between pelvic floor dysfunction & lumbar spine/hip/pelvic girdle dysfunction, urge suppression, and etiology of urinary urgency    Written Home Exercises provided: yes  Exercises were reviewed, and Trice was able to demonstrate them prior to the end of the session. Trice demonstrated good understanding of the education provided. See EMR under 'Patient Instructions' for exercises and education provided during session.    ASSESSMENT     Trice is a 39 y.o. female referred to outpatient physical therapy with a medical diagnosis of urinary frequency and additional complaints of stress urinary incontinence, back/SI pain, fecal urgency, dyspareunia, and difficulty with bowel movements. Symptoms impair the patient's ability to perform ADLs and functional mobility, as well as remain continent.  No direct pelvic floor examination performed today, but pt presentation and subjective report suggest pelvic floor dysfunction, particularly tension that leads to urinary/bowel/ and sexual dysfunction  Pt will benefit from pelvic floor muscle training, bladder habit retraining, bowel habit retraining, core/hip strengthening, manual therapy interventions,  and functional retraining    Patient prognosis is good  Trice will benefit from skilled outpatient physical therapy to address the deficits stated above and in the chart below, provide patient/family education, and to maximize the patient's level of independence.     Plan of care discussed with patient: yes  Patient's spiritual, cultural and educational needs considered, and the patient is agreeable to the plan of care and goals as stated below:     Anticipated Barriers for therapy: none    Medical Necessity is demonstrated by the following -  History  Co-morbidities and personal factors that may impact the plan of care [x] LOW: no personal factors / co-morbidities  [] MODERATE: 1-2 personal factors / co-morbidities  [] HIGH: 3+ personal factors / co-morbidities    Moderate / High Support Documentation:   Co-morbidities affecting plan of care: -    Personal Factors:   no deficits     Examination  Body Structures and Functions, activity limitations and participation restrictions that may impact the plan of care [x] LOW: addressing 1-2 elements  [] MODERATE: 3+ elements  [] HIGH: 4+ elements (please support below)    Moderate / High Support Documentation: -     Clinical Presentation [x] LOW: stable  [] MODERATE: Evolving  [] HIGH: Unstable     Decision Making/ Complexity Score: low       Short Term Goals: 6 weeks   Pt to report urinary frequency of no more than every 1 hour to demonstrate improved bladder control   Pt to report >50% improvement in fecal urgency  Pt to be independent with introductory home exercise program     Long Term Goals: 12 weeks   Pt to report urinary frequency of no more than every 2-3 hours to demonstrate improved bladder control   Pt to deny urinary incontinence to demonstrate improved pelvic floor coordination   Pt to report minimal/no back or SIJ pain for improved pelvic floor support  Pt to report >90% improvement in fecal urgency  Pt to score no more than 4% impairment on the PFDI Urinary  FOTO survey to demonstrate reduced impairment due to pelvic floor dysfunction   Pt to be independent with advanced home exercise program      PLAN     Plan of Care certification: 10/9/2024 to 1/9/2025    Outpatient Physical Therapy - 1-2 times per week for 12 weeks to include the following interventions: Therapeutic Exercise, Manual Therapy, Therapeutic Activity, Neuromuscular Re-education, Electrical Stimulation Unattended, Self-Care, Patient Education      Charlotte Zaman, PT, DPT  Board-Certified Clinical Specialist in Women's Health Physical Therapy

## 2024-10-09 NOTE — PATIENT INSTRUCTIONS
*Aim to urinate no more than once an hour (as a short-term goal, working towards once every 2-3 hours)    Side-lying clam, 2-3 sets of 10  - Holding the hips level, lift the top leg a few inches.   *Hold the hips stacked on top of each other, not rolling back with movement  *Don't hold your breath  *Roll the top hip more forward than you think

## 2024-10-10 ENCOUNTER — TELEPHONE (OUTPATIENT)
Dept: HEMATOLOGY/ONCOLOGY | Facility: CLINIC | Age: 39
End: 2024-10-10
Payer: COMMERCIAL

## 2024-10-10 NOTE — TELEPHONE ENCOUNTER
Returned patient call.  Informed that her images are available but the report has not been completed or/signed. Informed that the results will be available shortly and  images are viewable, as that was the patient's concern thinking she would have to complete the exam because the images did not come through. Offered reassurance.     Patient verbalized understanding    No further questions or concerns noted during call.

## 2024-10-10 NOTE — TELEPHONE ENCOUNTER
----- Message from Melanie sent at 10/10/2024  4:33 PM CDT -----  Regarding: results  Contact: [phone]  Patient is calling in to get the results of her mammogram that was done 11/04. Please contact patient at [phone]

## 2024-10-15 NOTE — PROGRESS NOTES
"Pelvic Health Physical Therapy   Treatment Note     Name: Flora Edmond  Clinic Number: [id number]    Therapy Diagnosis:   Encounter Diagnoses   Name Primary?    Pelvic floor dysfunction Yes    Coordination impairment     Pain of pelvic girdle      Referring Provider: Alyce Ramsey NP    Visit Date: 10/18/2024    Referring Provider Orders: PT Eval and Treat  Medical Diagnosis from Referral: Urinary frequency [R35.0]   Evaluation Date: 10/9/2024  Authorization Period Expiration: 12/31/2024  Plan of Care Expiration: 1/9/2025  Visit # / Visits authorized: 2/20  FOTO: 1 (10/9/2024)    Cancelled Visits: -  No Show Visits: -    Time In: 16:10  Time Out: 16:30  Total Billable Time: 45 minutes    Precautions: standard    Subjective     Trice reports practicing urge suppression, and her void volumes have been better (~6 seconds on average).    She was compliant with home exercise program.  Response to previous treatment: no adverse effect  Functional change: none reported    Pain: {0-10:93700::"0"}/10    Objective     Trice received the following interventions during the treatment session:   (TrA = transverse abdominis, PFM = pelvic floor muscle, sEMG = surface electromyography, RUSI = Rehabilitative Ultrasound Imaging)  Pt consented to pelvic floor muscle assessment and treatment on ***. See EMR.    Therapeutic activities to improve functional performance for *** minutes -  [x] Education on pelvic floor anatomy & function  [x] Pelvic floor assessment  [x] Instruction on self-release to superficial PFM to reduce discomfort with initial penetration - pt able to return demonstrate independently after initial instruction  [x] Discussion of intercourse considerations for pelvic pain - lubricant, positioning, pillows, pelvic floor massage  [x] Instruction on techniques to reduce excess pelvic floor tension - body scanning, timed check-ins, diaphragmatic breathing, stretching  [x] Instruction on urge suppression " techniques  [x] Education on techniques to reduce post-void dribble  [x] Instruction on the Knack for reduction of stress urinary incontinence  [x] Guided body scan meditation to reduce excess muscle tension  [x] Instruction on log-roll technique for transfers to reduce strain on back/abdominal wall, reduce incidence of incontinence  [x] Education on voiding optimization - seated on toilet, no pushing, pelvic floor squeeze upon completion  [x] Education on bowel movement optimization - positioning, toilet stool, breath coordination, pelvic floor relaxation, abdominal wall bracing  [x] HEP building/HEP review    Manual therapy techniques: to develop flexibility, desensitization, and extensibility for *** minutes -  [] Obturator release at ischial tuberosities + active release with resisted contralateral hip external rotation (B)  [] Manual release to pelvic floor muscles externally: levator ani, superficial transverse perineal, perineal body  [] Manual release to pelvic floor muscles internally/vaginally: levator ani, superficial transverse perineal, perineal body  [] Myofascial decompression/cupping to lower abdominal wall      Home Exercises and Patient Education     Patient Education: progression of plan of care, plan for next session, {[id number]}    Home Exercise Program (10/9/24): urge suppression, clams, void no more than 1x/hour  Home Exercise Program Updates: ***    Exercises were reviewed, and Trice was able to demonstrate them prior to the end of the session, as needed. Trice demonstrated good understanding of the education provided.   See 'Patient Instructions' for exercises/instructions provided.    Assessment     ***    Trice is progressing well towards her goals.   Pt prognosis: good    Pt will continue to benefit from skilled outpatient physical therapy to address the deficits listed in the problem list box on initial evaluation, provide pt/family education and to maximize pt's level of  independence in the home and community environment.     Pt's spiritual, cultural and educational needs considered and pt agreeable to plan of care and goals.  Anticipated barriers to physical therapy: none      Short Term Goals: 6 weeks   Pt to report urinary frequency of no more than every 1 hour to demonstrate improved bladder control - NOT MET  Pt to report >50% improvement in fecal urgency - NOT MET  Pt to be independent with introductory home exercise program - NOT MET      Long Term Goals: 12 weeks   Pt to report urinary frequency of no more than every 2-3 hours to demonstrate improved bladder control - NOT MET   Pt to deny urinary incontinence to demonstrate improved pelvic floor coordination - NOT MET   Pt to report minimal/no back or SIJ pain for improved pelvic floor support - NOT MET  Pt to report >90% improvement in fecal urgency - NOT MET  Pt to score no more than 4% impairment on the PFDI Urinary FOTO survey to demonstrate reduced impairment due to pelvic floor dysfunction - NOT MET   Pt to be independent with advanced home exercise program - NOT MET      Plan     Continue per Plan of Care      Charlotte Zaman, PT, DPT    Board-Certified Clinical Specialist in Women's Health Physical Therapy

## 2024-10-17 ENCOUNTER — TELEPHONE (OUTPATIENT)
Dept: ENDOSCOPY | Facility: HOSPITAL | Age: 39
End: 2024-10-17
Payer: COMMERCIAL

## 2024-10-18 ENCOUNTER — TELEPHONE (OUTPATIENT)
Dept: ENDOSCOPY | Facility: HOSPITAL | Age: 39
End: 2024-10-18
Payer: COMMERCIAL

## 2024-10-18 ENCOUNTER — PATIENT MESSAGE (OUTPATIENT)
Dept: ENDOSCOPY | Facility: HOSPITAL | Age: 39
End: 2024-10-18
Payer: COMMERCIAL

## 2024-10-18 ENCOUNTER — CLINICAL SUPPORT (OUTPATIENT)
Dept: REHABILITATION | Facility: OTHER | Age: 39
End: 2024-10-18
Payer: COMMERCIAL

## 2024-10-18 DIAGNOSIS — R27.8 COORDINATION IMPAIRMENT: ICD-10-CM

## 2024-10-18 DIAGNOSIS — M62.89 PELVIC FLOOR DYSFUNCTION: Primary | ICD-10-CM

## 2024-10-18 DIAGNOSIS — R10.2 PAIN OF PELVIC GIRDLE: ICD-10-CM

## 2024-10-18 PROCEDURE — 97112 NEUROMUSCULAR REEDUCATION: CPT | Mod: PN | Performed by: PHYSICAL THERAPIST

## 2024-10-18 PROCEDURE — 97530 THERAPEUTIC ACTIVITIES: CPT | Mod: PN | Performed by: PHYSICAL THERAPIST

## 2024-10-18 NOTE — PROGRESS NOTES
Pelvic Health Physical Therapy   Treatment Note     Name: Flora Edmond  Clinic Number: [id number]    Therapy Diagnosis:   Encounter Diagnoses   Name Primary?    Pelvic floor dysfunction Yes    Coordination impairment     Pain of pelvic girdle      Referring Provider: Alyce Ramsey NP    Visit Date: 10/18/2024    Referring Provider Orders: PT Eval and Treat  Medical Diagnosis from Referral: Urinary frequency [R35.0]   Evaluation Date: 10/9/2024  Authorization Period Expiration: 12/31/2024  Plan of Care Expiration: 1/9/2025  Visit # / Visits authorized: 2/20  FOTO: 1 (10/9/2024)    Cancelled Visits: -  No Show Visits: -    Time In: 16:10  Time Out: 16:46  Total Billable Time: 35 minutes    Precautions: standard    Subjective     Trice reports practicing urge suppression, and her void volumes have been better (~6 seconds on average).    She was compliant with home exercise program.  Response to previous treatment: no adverse effect  Functional change: improved urinary frequency    Pain: mild - L posterior hip    Objective     Trice received the following interventions during the treatment session:   (TrA = transverse abdominis, PFM = pelvic floor muscle, sEMG = surface electromyography, RUSI = Rehabilitative Ultrasound Imaging)  Pt consented to pelvic floor muscle assessment and treatment on 10/18/24. See EMR.    Therapeutic activities to improve functional performance for 23 minutes -  [x] Education on pelvic floor anatomy & function  [x] Hip, pelvic girdle, abdominal wall, and pelvic floor assessment - see below  [x] Instruction on techniques to reduce excess pelvic floor tension - body scanning, visualization  [x] Instruction on urge suppression techniques  [x] Guided body scan meditation to reduce excess muscle tension  [x] HEP building/HEP review    Manual therapy techniques: to develop flexibility, desensitization, and extensibility for 4 minutes -  [] Obturator release at ischial tuberosities + active  release with resisted contralateral hip external rotation (B)  [] Manual release to pelvic floor muscles externally: levator ani, superficial transverse perineal, perineal body  [x] Manual release to pelvic floor muscles internally/vaginally: B layer 2  [] Myofascial decompression/cupping to lower abdominal wall  [x] L SIJ manipulation  [x] L long-arm manipulation of hip    Neuromuscular re-education activities to develop balance, coordination, kinesthetic sense, motor control, proprioception, and posture for 8 minutes -   [x] PFM relaxation with verbal and intravaginal tactile cues  [x] PFM squeeze with full relaxation between reps - able to demonstrate correctly with verbal and intravaginal cues  [x] Bridging against blue band held across waist, x15  [x] QP rocking into internal rotation, x20    ----------------------    SPECIAL TESTING  DEBRA Test positive   SI Joint Compression negative   SI Joint Distraction negative   Posterior Pelvic Pain Provocation (P4) Test negative   Pain Provocation: pubic symphysis positive   Pain Provocation: R sacroiliac joint negative   Pain Provocation: L sacroiliac joint negative      Palpation: tenderness to palpation of L piriformis and lumbar paraspinals    Hip Strength 10/18/2024   R hip flexion 4/5   R hip external rotation 4-/5   L hip flexion 4/5   L hip external rotation 4-/5     Hip Range of Motion 10/18/2024   R internal rotation WNL   L internal rotation 15°      ABDOMINAL WALL ASSESSMENT  Palpation: no tenderness  Pelvic Girdle Stability: Pt demonstrates moderately impaired ability to stabilize pelvis with Active Straight Leg Raise Test.     VAGINAL PELVIC FLOOR EXAM - external assessment  Introitus: WNL  Skin condition: WNL  Scarring: none   Voluntary contraction: visible lift  Voluntary relaxation: visible drop  Bearing down: bulge     VAGINAL PELVIC FLOOR EXAM - internal assessment  Pain: tenderness to palpation of left layer 3 + urgency with palpation of bilateral  layer 2  Sensation: able to localized pressure appropriately   Vaginal vault: WNL   Muscle Bulk: increased tone, particularly on L  Muscle Power: 3-4/5  Muscle Endurance: 8 seconds  # Reps To Fatigue: 4       Quality of contraction: slow rise and incomplete relaxation   Specificity: WNL   Coordination: WNL     Home Exercises and Patient Education     Patient Education: progression of plan of care, plan for next session, pelvic floor anatomy & function, consequences of elevated pelvic floor muscle tension, relationship between TrA & PFM, relationship between hips & PFM, relationship between pelvic floor dysfunction & lumbar spine/hip/pelvic girdle dysfunction, urge suppression, and etiology of urinary urgency    Home Exercise Program (10/9/24): urge suppression, clams, void no more than 1x/hour  Home Exercise Program Updates (10/18/24): PFM coordination drills    Exercises were reviewed, and Trice was able to demonstrate them prior to the end of the session, as needed. Trice demonstrated good understanding of the education provided.   See 'Patient Instructions' for exercises/instructions provided.    Assessment     Pt tolerated treatment session well, with good understanding of education provided and good tolerance of corrective exercise. Assessment reveals pelvic girdle stability deficits, hip weakness, L hip range of motion deficits, pelvic floor tension/tenderness, and pelvic floor weakness. Special testing indicates pelvic girdle dysfunction, and L hip pain improved with manipulations. Pt's functional mobility and ability to perform ADLs still limited by pelvic floor dysfunction. She requires skilled therapy for continued patient education and coordination retraining.      Trice is progressing well towards her goals.   Pt prognosis: good    Pt will continue to benefit from skilled outpatient physical therapy to address the deficits listed in the problem list box on initial evaluation, provide pt/family education  and to maximize pt's level of independence in the home and community environment.     Pt's spiritual, cultural and educational needs considered and pt agreeable to plan of care and goals.  Anticipated barriers to physical therapy: none      Short Term Goals: 6 weeks   Pt to report urinary frequency of no more than every 1 hour to demonstrate improved bladder control - PARTIALLY MET  Pt to report >50% improvement in fecal urgency - PARTIALLY MET  Pt to be independent with introductory home exercise program - PARTIALLY MET      Long Term Goals: 12 weeks   Pt to report urinary frequency of no more than every 2-3 hours to demonstrate improved bladder control - NOT MET   Pt to deny urinary incontinence to demonstrate improved pelvic floor coordination - NOT MET   Pt to report minimal/no back or SIJ pain for improved pelvic floor support - NOT MET  Pt to report >90% improvement in fecal urgency - NOT MET  Pt to score no more than 4% impairment on the PFDI Urinary FOTO survey to demonstrate reduced impairment due to pelvic floor dysfunction - NOT MET   Pt to be independent with advanced home exercise program - NOT MET      Plan     Continue per Plan of Care      Charlotte Zaman, PT, DPT    Board-Certified Clinical Specialist in Women's Health Physical Therapy

## 2024-10-18 NOTE — TELEPHONE ENCOUNTER
Spoke to pt for pre-call to confirm scheduled Colonoscopy and patient verbalized understanding of the following:       Date of Procedure (s)  verified 10/25/24  Arrival Time 6:45 AM verified.  Location of Procedure(s) [place] 4th Floor verified.  NPO status reinforced. Ok to continue clear liquids up until 2 hours prior to the Endoscopy procedure.   patient denies taking blood thinning or glp1 medications  Pt confirmed receipt of prep instructions and Rx prep (if applicable).  Instructions provided to patient via [facility]  Pt confirmed ride home after procedure if procedure requires anesthesia.   Pre-call screening questionnaire reviewed and completed with patient.   Appointment details are tentative, including check-in time.  If the patient begins taking any blood thinning medications, injectable weight loss/diabetes medications (other than insulin), or Adipex (phentermine) patient was instructed to contact the endoscopy scheduling department as soon as possible.  Patient was advised to call the endoscopy scheduling department if any questions or concerns arise.       [facility] Scheduling Department

## 2024-10-20 NOTE — PATIENT INSTRUCTIONS
Pelvic floor muscle coordination drills, 2 sets of 10 daily. Gently squeeze the pelvic floor and then FULLY relax. Spend a few breaths focusing on relaxation before moving to the next contraction. Think flaring the pelvic floor like a trumpet or feeling the pelvic floor drop.

## 2024-10-22 NOTE — PROGRESS NOTES
Pelvic Health Physical Therapy   Treatment Note     Name: Flora Edmond  Clinic Number: [id number]    Therapy Diagnosis:   Encounter Diagnoses   Name Primary?    Pelvic floor dysfunction Yes    Coordination impairment     Pain of pelvic girdle      Referring Provider: Alyce Ramsey NP    Visit Date: 10/23/2024    Referring Provider Orders: PT Eval and Treat  Medical Diagnosis from Referral: Urinary frequency [R35.0]   Evaluation Date: 10/9/2024  Authorization Period Expiration: 12/31/2024  Plan of Care Expiration: 1/9/2025  Visit # / Visits authorized: 3/20  FOTO: 1 (10/9/2024)    Cancelled Visits: -  No Show Visits: -    Time In: 10:45  Time Out: 11:33  Total Billable Time: 42 minutes    Precautions: standard    Subjective     Trice reports no updates since last session.    She was compliant with home exercise program.  Response to previous treatment: no adverse effect  Functional change: improved urinary frequency    Pain: mild - L posterior hip    Objective     Trice received the following interventions during the treatment session:   (TrA = transverse abdominis, PFM = pelvic floor muscle, sEMG = surface electromyography, RUSI = Rehabilitative Ultrasound Imaging)  Pt consented to pelvic floor muscle assessment and treatment on 10/18/24. See EMR.    Therapeutic activities to improve functional performance for 10 minutes -  [x] Education on pelvic floor anatomy & function  [] Hip, pelvic girdle, abdominal wall, and pelvic floor assessment - see below  [x] Instruction on techniques to reduce excess pelvic floor tension - body scanning, visualization  [] Instruction on urge suppression techniques  [x] Guided body scan meditation to reduce excess muscle tension  [x] HEP building/HEP review    Manual therapy techniques: to develop flexibility, desensitization, and extensibility for 4 minutes -  [] Obturator release at ischial tuberosities + active release with resisted contralateral hip external rotation (B)  []  "Manual release to pelvic floor muscles externally: levator ani, superficial transverse perineal, perineal body  [] Manual release to pelvic floor muscles internally/vaginally: B layer 2  [] Myofascial decompression/cupping to lower abdominal wall  [x] L SIJ manipulation  [x] L long-arm manipulation of hip  [x] Lateral distraction to L hip with belt    Neuromuscular re-education activities to develop balance, coordination, kinesthetic sense, motor control, proprioception, and posture for 28 minutes -   [] PFM relaxation with verbal and intravaginal tactile cues  [] PFM squeeze with full relaxation between reps - able to demonstrate correctly with verbal and intravaginal cues  [] Bridging against blue band held across waist, x15  [x] Bridging + unilateral shoulder extension with green band (R&L), x15  [] QP rocking into internal rotation, x20  [x] Side-lying reverse clam (L), x20 with 3-second hold  [x] Pretzel (L), x15 with 3-second hold  [x] Pelvic floor muscle coordination training with sEMG feedback: downtraining and pelvic floor muscle squeezes - Pt presents with resting tone of 5-7 microvolts and appropriate PFM contraction on command. Pt was able to downtrain to 3-5 microvolts with PT cues for diaphragmatic breathing, relaxation, visualization, and body scanning. She struggled to consistently lower PFM tone and maintain, sometimes getting "lost" after squeezes/exercises and having trouble downtraining again. Focusing on belly relaxation was particularly helpful.     Home Exercises and Patient Education     Patient Education: progression of plan of care, plan for next session, pelvic floor anatomy & function, consequences of elevated pelvic floor muscle tension, relationship between TrA & PFM, relationship between hips & PFM, relationship between pelvic floor dysfunction & lumbar spine/hip/pelvic girdle dysfunction, urge suppression, and etiology of urinary urgency    Home Exercise Program (10/9/24): urge " suppression, clams, void no more than 1x/hour  Home Exercise Program Updates (10/18/24): PFM coordination drills  HEP update (10/23/24): none    Exercises were reviewed, and Trice was able to demonstrate them prior to the end of the session, as needed. Trice demonstrated good understanding of the education provided.   See 'Patient Instructions' for exercises/instructions provided.    Assessment     Pt tolerated treatment session well, with good understanding of education provided and good tolerance of corrective exercise. L hip IR range of motion restrictions improved post-manipulations. Assessment with sEMG reveals elevated resting tone of the pelvic floor, but pt was able to downtrain effectively using auditory and visual cues from sEMG (not all the way down to true resting tone, but improved). Pt's functional mobility and ability to perform ADLs still limited by pelvic floor dysfunction. She requires skilled therapy for continued patient education and coordination retraining.      Trice is progressing well towards her goals.   Pt prognosis: good    Pt will continue to benefit from skilled outpatient physical therapy to address the deficits listed in the problem list box on initial evaluation, provide pt/family education and to maximize pt's level of independence in the home and community environment.     Pt's spiritual, cultural and educational needs considered and pt agreeable to plan of care and goals.  Anticipated barriers to physical therapy: none      Short Term Goals: 6 weeks   Pt to report urinary frequency of no more than every 1 hour to demonstrate improved bladder control - PARTIALLY MET  Pt to report >50% improvement in fecal urgency - PARTIALLY MET  Pt to be independent with introductory home exercise program - MET      Long Term Goals: 12 weeks   Pt to report urinary frequency of no more than every 2-3 hours to demonstrate improved bladder control - NOT MET   Pt to deny urinary incontinence to  demonstrate improved pelvic floor coordination - NOT MET   Pt to report minimal/no back or SIJ pain for improved pelvic floor support - NOT MET  Pt to report >90% improvement in fecal urgency - NOT MET  Pt to score no more than 4% impairment on the PFDI Urinary FOTO survey to demonstrate reduced impairment due to pelvic floor dysfunction - NOT MET   Pt to be independent with advanced home exercise program - NOT MET      Plan     Continue per Plan of Care      Charlotte Zaman, PT, DPT    Board-Certified Clinical Specialist in Women's Health Physical Therapy

## 2024-10-23 ENCOUNTER — CLINICAL SUPPORT (OUTPATIENT)
Dept: REHABILITATION | Facility: OTHER | Age: 39
End: 2024-10-23
Payer: COMMERCIAL

## 2024-10-23 DIAGNOSIS — R10.2 PAIN OF PELVIC GIRDLE: ICD-10-CM

## 2024-10-23 DIAGNOSIS — R27.8 COORDINATION IMPAIRMENT: ICD-10-CM

## 2024-10-23 DIAGNOSIS — M62.89 PELVIC FLOOR DYSFUNCTION: Primary | ICD-10-CM

## 2024-10-23 PROCEDURE — 97530 THERAPEUTIC ACTIVITIES: CPT | Mod: PN | Performed by: PHYSICAL THERAPIST

## 2024-10-23 PROCEDURE — 97112 NEUROMUSCULAR REEDUCATION: CPT | Mod: PN | Performed by: PHYSICAL THERAPIST

## 2024-10-24 ENCOUNTER — PATIENT MESSAGE (OUTPATIENT)
Dept: ADMINISTRATIVE | Facility: HOSPITAL | Age: 39
End: 2024-10-24
Payer: COMMERCIAL

## 2024-10-25 ENCOUNTER — HOSPITAL ENCOUNTER (OUTPATIENT)
Facility: HOSPITAL | Age: 39
Discharge: HOME | End: 2024-10-25
Attending: COLON & RECTAL SURGERY | Admitting: COLON & RECTAL SURGERY
Payer: COMMERCIAL

## 2024-10-25 ENCOUNTER — ANESTHESIA EVENT (OUTPATIENT)
Dept: ENDOSCOPY | Facility: HOSPITAL | Age: 39
End: 2024-10-25
Payer: COMMERCIAL

## 2024-10-25 ENCOUNTER — ANESTHESIA (OUTPATIENT)
Dept: ENDOSCOPY | Facility: HOSPITAL | Age: 39
End: 2024-10-25
Payer: COMMERCIAL

## 2024-10-25 VITALS
WEIGHT: 145 LBS | BODY MASS INDEX: 24.16 KG/M2 | RESPIRATION RATE: 16 BRPM | HEART RATE: 74 BPM | HEIGHT: 65 IN | DIASTOLIC BLOOD PRESSURE: 68 MMHG | SYSTOLIC BLOOD PRESSURE: 122 MMHG | TEMPERATURE: 98 F | OXYGEN SATURATION: 99 %

## 2024-10-25 DIAGNOSIS — Z12.11 COLON CANCER SCREENING: ICD-10-CM

## 2024-10-25 PROBLEM — Z85.038 HISTORY OF COLON CANCER: Status: ACTIVE | Noted: 2024-10-25

## 2024-10-25 LAB
B-HCG UR QL: NEGATIVE
CTP QC/QA: YES

## 2024-10-25 PROCEDURE — 27201089 HC SNARE, DISP (ANY): Performed by: COLON & RECTAL SURGERY

## 2024-10-25 PROCEDURE — 37000009 HC ANESTHESIA EA ADD 15 MINS: Performed by: COLON & RECTAL SURGERY

## 2024-10-25 PROCEDURE — 37000008 HC ANESTHESIA 1ST 15 MINUTES: Performed by: COLON & RECTAL SURGERY

## 2024-10-25 PROCEDURE — 45380 COLONOSCOPY AND BIOPSY: CPT | Mod: 33,59 | Performed by: COLON & RECTAL SURGERY

## 2024-10-25 PROCEDURE — 45385 COLONOSCOPY W/LESION REMOVAL: CPT | Mod: 33 | Performed by: COLON & RECTAL SURGERY

## 2024-10-25 PROCEDURE — 63600175 PHARM REV CODE 636 W HCPCS: Performed by: NURSE ANESTHETIST, CERTIFIED REGISTERED

## 2024-10-25 PROCEDURE — 45380 COLONOSCOPY AND BIOPSY: CPT | Mod: 59,33,, | Performed by: COLON & RECTAL SURGERY

## 2024-10-25 PROCEDURE — 88305 TISSUE EXAM BY PATHOLOGIST: CPT | Performed by: PATHOLOGY

## 2024-10-25 PROCEDURE — 81025 URINE PREGNANCY TEST: CPT | Performed by: COLON & RECTAL SURGERY

## 2024-10-25 PROCEDURE — 45385 COLONOSCOPY W/LESION REMOVAL: CPT | Mod: 33,,, | Performed by: COLON & RECTAL SURGERY

## 2024-10-25 RX ORDER — PROPOFOL 10 MG/ML
VIAL (ML) INTRAVENOUS
Status: DISCONTINUED | OUTPATIENT
Start: 2024-10-25 | End: 2024-10-25

## 2024-10-25 RX ORDER — LIDOCAINE HYDROCHLORIDE 20 MG/ML
INJECTION INTRAVENOUS
Status: DISCONTINUED | OUTPATIENT
Start: 2024-10-25 | End: 2024-10-25

## 2024-10-25 RX ORDER — SODIUM CHLORIDE 9 MG/ML
INJECTION, SOLUTION INTRAVENOUS CONTINUOUS
Status: DISCONTINUED | OUTPATIENT
Start: 2024-10-25 | End: 2024-10-25 | Stop reason: HOSPADM

## 2024-10-25 RX ADMIN — PROPOFOL 200 MCG/KG/MIN: 10 INJECTION, EMULSION INTRAVENOUS at 08:10

## 2024-10-25 RX ADMIN — LIDOCAINE HYDROCHLORIDE 80 MG: 20 INJECTION INTRAVENOUS at 08:10

## 2024-10-25 RX ADMIN — PROPOFOL 70 MG: 10 INJECTION, EMULSION INTRAVENOUS at 08:10

## 2024-10-25 NOTE — TRANSFER OF CARE
"Anesthesia Transfer of Care Note    Patient: Flora Edmond    Procedure(s) Performed: Procedure(s) (LRB):  COLONOSCOPY, SCREENING, HIGH RISK PATIENT (N/A)    Patient location: GI    Anesthesia Type: general    Transport from OR: Transported from OR on room air with adequate spontaneous ventilation    Post pain: adequate analgesia    Post assessment: no apparent anesthetic complications and tolerated procedure well    Post vital signs: stable    Level of consciousness: awake    Nausea/Vomiting: no nausea/vomiting    Complications: none    Transfer of care protocol was followed    Last vitals: Visit Vitals  BP (!) 137/90   Pulse 64   Temp 36.8 °C (98.2 °F) (Skin)   Resp 16   Ht 5' 5" (1.651 m)   Wt 65.8 kg (145 lb)   LMP  (LMP Unknown)   SpO2 100%   Breastfeeding No   BMI 24.13 kg/m²     "

## 2024-10-25 NOTE — ANESTHESIA PREPROCEDURE EVALUATION
10/25/2024  Flora Edmond is a 39 y.o., female.    Past Medical History:   Diagnosis Date    Allergy     seasonal    BRCA1 negative August 2023    BRCA2 negative August 2023    Cervical rib     Colon cancer July 1st    Vitamin D deficiency      Past Surgical History:   Procedure Laterality Date    COLONOSCOPY N/A 6/30/2023    Procedure: COLONOSCOPY;  Surgeon: Andriy Vargas MD;  Location: [facility] ENDOSCOPY;  Service: Endoscopy;  Laterality: N/A;  Sutab  Prep instructions given to pt in clinic -    COLONOSCOPY N/A 10/12/2023    Procedure: COLONOSCOPY;  Surgeon: Alda Partida MD;  Location: [facility] ([address] [place] [address]);  Service: Endoscopy;  Laterality: N/A;  8/21- referred to Dr. Partida/ Prep instructions to portal. AS.pt request sooner appt with the first available with any provider. r/s 10.13.23 with  at [facility]  8/30-pt to be done with Dr. Partida-MS  10/5-precall complete-MS    WISDOM TOOTH EXTRACTION  2007     Patient Active Problem List   Diagnosis    Osteopenia dx by dexa at [facility] Jan 2015    HSV-1 (herpes simplex virus 1) infection    Vitamin D deficiency    Family history of hemophilia B    Iron deficiency anemia due to chronic blood loss    Malignant neoplasm of sigmoid colon    Thoracic outlet syndrome    HLA B27 (HLA B27 positive)    At high risk for breast cancer    Family history of breast cancer in mother    Pelvic floor dysfunction    Coordination impairment    Pain of pelvic girdle           Pre-op Assessment    I have reviewed the Patient Summary Reports.     I have reviewed the Nursing Notes. I have reviewed the NPO Status.   I have reviewed the Medications.     Review of Systems  Anesthesia Hx:  No problems with previous Anesthesia             Denies Family Hx of Anesthesia complications.    Denies Personal Hx of Anesthesia complications.                     Hematology/Oncology:  Hematology Normal                                     Cardiovascular:  Cardiovascular Normal                                              Musculoskeletal:  Musculoskeletal Normal                Neurological:  Neurology Normal                                      Dermatological:  Skin Normal        Physical Exam  General: Cooperative, Alert and Oriented    Airway:  Mallampati: II   Mouth Opening: Normal  TM Distance: Normal  Tongue: Normal  Neck ROM: Normal ROM    Dental:  Intact        Anesthesia Plan  Type of Anesthesia, risks & benefits discussed:    Anesthesia Type: Gen Natural Airway  Intra-op Monitoring Plan: Standard ASA Monitors  Induction:  IV  Informed Consent: Informed consent signed with the Patient and all parties understand the risks and agree with anesthesia plan.  All questions answered.   ASA Score: 3  Day of Surgery Review of History & Physical: H&P Update referred to the surgeon/provider.I have interviewed and examined the patient. I have reviewed the patient's H&P dated: There are no significant changes.     Ready For Surgery From Anesthesia Perspective.     .

## 2024-10-25 NOTE — ANESTHESIA POSTPROCEDURE EVALUATION
Anesthesia Post Evaluation    Patient: Flora Edmond    Procedure(s) Performed: Procedure(s) (LRB):  COLONOSCOPY, SCREENING, HIGH RISK PATIENT (N/A)    Final Anesthesia Type: general      Patient location during evaluation: GI PACU  Patient participation: Yes- Able to Participate  Level of consciousness: awake and alert and oriented  Post-procedure vital signs: reviewed and stable  Pain management: adequate  Airway patency: patent    PONV status at discharge: No PONV  Anesthetic complications: no      Cardiovascular status: hemodynamically stable  Respiratory status: unassisted, spontaneous ventilation and room air  Hydration status: euvolemic  Follow-up not needed.              Vitals Value Taken Time   /68 10/25/24 0921   Temp 98 10/25/24 1053   Pulse 74 10/25/24 0921   Resp 16 10/25/24 0921   SpO2 99 % 10/25/24 0921         Event Time   Out of Recovery 09:23:22         Pain/Julisa Score: Julisa Score: 10 (10/25/2024  8:47 AM)

## 2024-10-29 ENCOUNTER — PATIENT MESSAGE (OUTPATIENT)
Dept: HEMATOLOGY/ONCOLOGY | Facility: CLINIC | Age: 39
End: 2024-10-29
Payer: COMMERCIAL

## 2024-10-30 ENCOUNTER — CLINICAL SUPPORT (OUTPATIENT)
Dept: REHABILITATION | Facility: OTHER | Age: 39
End: 2024-10-30
Payer: COMMERCIAL

## 2024-10-30 DIAGNOSIS — R10.2 PAIN OF PELVIC GIRDLE: ICD-10-CM

## 2024-10-30 DIAGNOSIS — M62.89 PELVIC FLOOR DYSFUNCTION: Primary | ICD-10-CM

## 2024-10-30 DIAGNOSIS — R27.8 COORDINATION IMPAIRMENT: ICD-10-CM

## 2024-10-30 PROCEDURE — 97530 THERAPEUTIC ACTIVITIES: CPT | Mod: PN | Performed by: PHYSICAL THERAPIST

## 2024-10-30 PROCEDURE — 97112 NEUROMUSCULAR REEDUCATION: CPT | Mod: PN | Performed by: PHYSICAL THERAPIST

## 2024-10-31 ENCOUNTER — PATIENT MESSAGE (OUTPATIENT)
Dept: REHABILITATION | Facility: OTHER | Age: 39
End: 2024-10-31
Payer: COMMERCIAL

## 2024-11-01 LAB
FINAL PATHOLOGIC DIAGNOSIS: NORMAL
GROSS: NORMAL
Lab: NORMAL

## 2024-11-18 ENCOUNTER — E-VISIT (OUTPATIENT)
Dept: PRIMARY CARE CLINIC | Facility: CLINIC | Age: 39
End: 2024-11-18
Payer: COMMERCIAL

## 2024-11-18 DIAGNOSIS — R42 DIZZINESS: Primary | ICD-10-CM

## 2024-11-18 RX ORDER — SCOLOPAMINE TRANSDERMAL SYSTEM 1 MG/1
1 PATCH, EXTENDED RELEASE TRANSDERMAL
Qty: 10 PATCH | Refills: 1 | Status: SHIPPED | OUTPATIENT
Start: 2024-11-18

## 2024-11-18 NOTE — PROGRESS NOTES
Patient ID: Flora Edmond is a 39 y.o. female.    Chief Complaint: General Illness (Entered automatically based on patient selection in [employer].)    The patient initiated a request through [employer] on 11/18/2024 for evaluation and management with a chief complaint of General Illness (Entered automatically based on patient selection in [employer].)     I evaluated the questionnaire submission on 11/18/2024.    [facility] Peq Evisit Supergroup-Common Problems    11/18/2024  2:46 AM CST - Filed by Patient   What do you need help with? Motion Sickness/Vertigo   Do you agree to participate in an E-Visit? Yes   If you have any of the following symptoms, please present to your local emergency room or call 911:  I acknowledge   Select all that apply: None of the above   What is the main issue you would like addressed today? Vertigo/nausea   Which of the following describes your symptoms? Return of a previous problem   Are you requesting medication due to an upcoming trip? No   How often do you get these symptoms? A few times a day   How long do the symptoms last? Days   Do you have other symptoms along with your motion sickness? Nausea;  Trouble with balance   Does anything make your symptoms better? Yes   What makes your symptoms better? Not moving   Does anything make your symptoms worse? Yes   What makes your symptoms worse? Moving my head   What triggers your symptoms? Turning your head quickly;  Other   Describe what triggers your symptoms: Acute severe onset was earlier today, Sunday at 4pm. Has not gone away. Last couple years would only before bending over or looking up sometimes.   I would like to address: Medication for Motion Sickness   Do you want to address a new or existing medication? I would like to start a new medication that I do not already take   What is the name of the medication that you would like to start? Scopolamine or whatever you recommend.   Have you taken a similar medication in the past? Yes   What  was the name of the similar medication? Meclizine. And ondanestron.   Why are you no longer on that medication? No specific reason    What medical condition is the  medication intended to treat? Vertigo/nausea   Provide any additional information you feel is important. If it does not resolve soon, should f/u with an ENT?   Please attach any relevant images or files    Are you able to take your vital signs? Yes   Systolic Blood Pressure:    Diastolic Blood Pressure:    Weight: 145   Height: 65   Pulse:    Temperature: 98.2   Respiration rate:    Pulse Oxygen:          No diagnosis found.     No orders of the defined types were placed in this encounter.           No follow-ups on file.      E-Visit Time Trackin minutes

## 2024-11-19 ENCOUNTER — PATIENT MESSAGE (OUTPATIENT)
Dept: PRIMARY CARE CLINIC | Facility: CLINIC | Age: 39
End: 2024-11-19
Payer: COMMERCIAL

## 2024-11-20 DIAGNOSIS — B00.9 HSV-1 (HERPES SIMPLEX VIRUS 1) INFECTION: ICD-10-CM

## 2024-11-20 RX ORDER — VALACYCLOVIR HYDROCHLORIDE 1 G/1
1000 TABLET, FILM COATED ORAL DAILY
Qty: 30 TABLET | Refills: 11 | Status: SHIPPED | OUTPATIENT
Start: 2024-11-20 | End: 2025-11-20

## 2024-12-03 NOTE — PROGRESS NOTES
Pelvic Health Physical Therapy   Treatment Note     Name: Flora Edmond  Clinic Number: [id number]    Therapy Diagnosis:   Encounter Diagnoses   Name Primary?    Pelvic floor dysfunction Yes    Coordination impairment     Pain of pelvic girdle      Referring Provider: Alyce Ramsey NP    Visit Date: 12/6/2024    Referring Provider Orders: PT Eval and Treat  Medical Diagnosis from Referral: Urinary frequency [R35.0]   Evaluation Date: 10/9/2024  Authorization Period Expiration: 12/31/2024  Plan of Care Expiration: 1/9/2025  Visit # / Visits authorized: 6/20  FOTO: 1 (10/9/2024)    Cancelled Visits: 11/14/24, 11/21/24  No Show Visits: -    Time In: 13:00  Time Out: 13:48  Total Billable Time: 40 minutes    Precautions: standard    Subjective     Trice reports continued improving urinary frequency (home easier than out in community). She still performs some just-in-case voids. L hip/SI pain persists.  She reports urinary frequency of every 3 hours typically. She notes increased frequency (once an hour) in the mornings when she drinks coffee (1-2 big mugs).    She was compliant with home exercise program - not as much with hip strengthening exercise  Response to previous treatment: no adverse effect  Functional change: improved urinary frequency    Pain: mild - L posterior hip    Objective     Trice received the following interventions during the treatment session:   (TrA = transverse abdominis, PFM = pelvic floor muscle, sEMG = surface electromyography, RUSI = Rehabilitative Ultrasound Imaging)  Pt consented to pelvic floor muscle assessment and treatment on 10/18/24. See EMR.    Therapeutic activities to improve functional performance for 12 minutes -  [x] Education on pelvic floor anatomy & function  [x] Education on bladder irritants, normal fluid intake, normal void intervals  [] Hip/pelvic girdle/abdominal wall/pelvic floor assessment - deficient L hip IR range of motion  [x] Instruction on techniques to  reduce excess pelvic floor tension - body scanning, visualization  [] Review of urge suppression, reducing just-in-case voids  [] Guided body scan meditation to reduce excess muscle tension  [] Butterfly stretch + pelvic drop, 2 minutes  [] Child's pose + pelvic drop, 2 minutes  [x] HEP building/HEP review    Manual therapy techniques: to develop flexibility, desensitization, and extensibility for 0 minutes -  [] Obturator release at ischial tuberosities + active release with resisted contralateral hip external rotation (B)  [] Manual release to pelvic floor muscles externally: levator ani, superficial transverse perineal, perineal body  [] Manual release to pelvic floor muscles internally/vaginally: B layer 2  [] Myofascial decompression/cupping to lower abdominal wall  [] L SIJ manipulation  [] L long-arm manipulation of hip  [] Lateral distraction to L hip with belt    Neuromuscular re-education activities to develop balance, coordination, kinesthetic sense, motor control, proprioception, and posture for 28 minutes -   [] PFM relaxation with verbal and intravaginal tactile cues  [] PFM squeeze with full relaxation between reps - able to demonstrate correctly with verbal and intravaginal cues  [] Bridging against blue band held across waist, x15  [] Bridging + unilateral shoulder extension with blue band (R&L), x10  [] QP rocking into internal rotation, x20  [] Side-lying clam with focus on pelvic girdle stability (R&L), x15  [] Side-lying hip abduction with focus on pelvic girdle stability (R&L), x15  [] Side-lying reverse clam (L), x20 with 3-second hold  [] Pretzel (R&L), x15  [x] Pelvic floor muscle coordination training with sEMG feedback: downtraining and pelvic floor muscle squeezes - Pt presents with resting tone of 3-5 microvolts and appropriate PFM contraction on command. Pt was able to downtrain to 2-4 microvolts with PT cues for diaphragmatic breathing, relaxation, visualization, and body scanning. She  "struggled to consistently lower PFM tone and maintain, sometimes getting "lost" after squeezes/exercises and having trouble downtraining again.     Home Exercises and Patient Education     Patient Education: progression of plan of care, plan for next session, pelvic floor anatomy & function, consequences of elevated pelvic floor muscle tension, relationship between TrA & PFM, relationship between hips & PFM, relationship between pelvic floor dysfunction & lumbar spine/hip/pelvic girdle dysfunction, urge suppression, and etiology of urinary urgency    Home Exercise Program (10/9/24): urge suppression, clams, void no more than 1x/hour  Home Exercise Program Updates (10/18/24): PFM coordination drills  HEP update (10/23/24): none  HEP update (10/30/24): side-lying clam, side-lying hip abduction, PFM coordination drills  HEP update (12/6/24): focus on PFM coordination drills    Exercises were reviewed, and Trice was able to demonstrate them prior to the end of the session, as needed. Trice demonstrated good understanding of the education provided.   See 'Patient Instructions' for exercises/instructions provided.    Assessment     Pt tolerated treatment session well, with good understanding of education provided and improving pelvic floor coordination within session. Assessment with sEMG reveals elevated resting tone of the pelvic floor, but pt was able to downtrain effectively using auditory and visual cues from sEMG - better than last time. Pt's functional mobility and ability to perform ADLs still limited by pelvic floor dysfunction. She requires skilled therapy for continued patient education and coordination retraining.      Trice is progressing well towards her goals.   Pt prognosis: good    Pt will continue to benefit from skilled outpatient physical therapy to address the deficits listed in the problem list box on initial evaluation, provide pt/family education and to maximize pt's level of independence in the " home and community environment.     Pt's spiritual, cultural and educational needs considered and pt agreeable to plan of care and goals.  Anticipated barriers to physical therapy: none      Short Term Goals: 6 weeks   Pt to report urinary frequency of no more than every 1 hour to demonstrate improved bladder control - MET  Pt to report >50% improvement in fecal urgency - MET  Pt to be independent with introductory home exercise program - MET      Long Term Goals: 12 weeks   Pt to report urinary frequency of no more than every 2-3 hours to demonstrate improved bladder control - PARTIALLY MET   Pt to deny urinary incontinence to demonstrate improved pelvic floor coordination - NOT MET   Pt to report minimal/no back or SIJ pain for improved pelvic floor support - NOT MET  Pt to report >90% improvement in fecal urgency - MET  Pt to score no more than 4% impairment on the PFDI Urinary FOTO survey to demonstrate reduced impairment due to pelvic floor dysfunction - NOT MET   Pt to be independent with advanced home exercise program - NOT MET      Plan     Continue per Plan of Care      Charlotte Zaman, PT, DPT    Board-Certified Clinical Specialist in Women's Health Physical Therapy

## 2024-12-04 ENCOUNTER — OFFICE VISIT (OUTPATIENT)
Dept: PRIMARY CARE CLINIC | Facility: CLINIC | Age: 39
End: 2024-12-04
Payer: COMMERCIAL

## 2024-12-04 VITALS
DIASTOLIC BLOOD PRESSURE: 66 MMHG | WEIGHT: 150.25 LBS | RESPIRATION RATE: 15 BRPM | HEART RATE: 93 BPM | HEIGHT: 65 IN | OXYGEN SATURATION: 96 % | SYSTOLIC BLOOD PRESSURE: 110 MMHG | TEMPERATURE: 98 F | BODY MASS INDEX: 25.03 KG/M2

## 2024-12-04 DIAGNOSIS — R05.2 SUBACUTE COUGH: Primary | ICD-10-CM

## 2024-12-04 DIAGNOSIS — J06.9 VIRAL URI WITH COUGH: ICD-10-CM

## 2024-12-04 PROCEDURE — 99999 PR PBB SHADOW E&M-EST. PATIENT-LVL IV: CPT | Mod: PBBFAC,,, | Performed by: NURSE PRACTITIONER

## 2024-12-04 PROCEDURE — 3078F DIAST BP <80 MM HG: CPT | Mod: CPTII,S$GLB,, | Performed by: NURSE PRACTITIONER

## 2024-12-04 PROCEDURE — 3008F BODY MASS INDEX DOCD: CPT | Mod: CPTII,S$GLB,, | Performed by: NURSE PRACTITIONER

## 2024-12-04 PROCEDURE — 3074F SYST BP LT 130 MM HG: CPT | Mod: CPTII,S$GLB,, | Performed by: NURSE PRACTITIONER

## 2024-12-04 PROCEDURE — 1159F MED LIST DOCD IN RCRD: CPT | Mod: CPTII,S$GLB,, | Performed by: NURSE PRACTITIONER

## 2024-12-04 PROCEDURE — 99214 OFFICE O/P EST MOD 30 MIN: CPT | Mod: S$GLB,,, | Performed by: NURSE PRACTITIONER

## 2024-12-04 RX ORDER — CABOTEGRAVIR 600 MG/3ML
SUSPENSION,EXTENDED RELEASE VIAL (ML) INTRAMUSCULAR
COMMUNITY
Start: 2024-10-14

## 2024-12-06 ENCOUNTER — CLINICAL SUPPORT (OUTPATIENT)
Dept: REHABILITATION | Facility: OTHER | Age: 39
End: 2024-12-06
Payer: COMMERCIAL

## 2024-12-06 DIAGNOSIS — R10.2 PAIN OF PELVIC GIRDLE: ICD-10-CM

## 2024-12-06 DIAGNOSIS — M62.89 PELVIC FLOOR DYSFUNCTION: Primary | ICD-10-CM

## 2024-12-06 DIAGNOSIS — R27.8 COORDINATION IMPAIRMENT: ICD-10-CM

## 2024-12-06 PROCEDURE — 97530 THERAPEUTIC ACTIVITIES: CPT | Mod: PN | Performed by: PHYSICAL THERAPIST

## 2024-12-06 PROCEDURE — 97112 NEUROMUSCULAR REEDUCATION: CPT | Mod: PN | Performed by: PHYSICAL THERAPIST

## 2024-12-17 ENCOUNTER — CLINICAL SUPPORT (OUTPATIENT)
Dept: REHABILITATION | Facility: OTHER | Age: 39
End: 2024-12-17
Payer: COMMERCIAL

## 2024-12-17 DIAGNOSIS — G89.29 CHRONIC BILATERAL LOW BACK PAIN WITHOUT SCIATICA: Primary | ICD-10-CM

## 2024-12-17 DIAGNOSIS — M54.50 CHRONIC BILATERAL LOW BACK PAIN WITHOUT SCIATICA: Primary | ICD-10-CM

## 2024-12-17 DIAGNOSIS — M62.81 WEAKNESS OF TRUNK MUSCULATURE: ICD-10-CM

## 2024-12-17 DIAGNOSIS — M53.86 DECREASED RANGE OF MOTION OF LUMBAR SPINE: ICD-10-CM

## 2024-12-17 PROCEDURE — 97530 THERAPEUTIC ACTIVITIES: CPT | Mod: PN

## 2024-12-17 PROCEDURE — 97140 MANUAL THERAPY 1/> REGIONS: CPT | Mod: PN

## 2024-12-17 PROCEDURE — 97162 PT EVAL MOD COMPLEX 30 MIN: CPT | Mod: PN

## 2024-12-17 NOTE — PATIENT INSTRUCTIONS
"Access Code: [email or web address]  URL: [email or web address]  Date: 12/17/2024  Prepared by: Lucius Schulz    Exercises  - Bird Dog  - 3-4 x daily - 5-7 x weekly - 1-3 sets - 8-10 reps - 3" hold  - Sidelying Hip External Rotation on Table  - 3-4 x daily - 5-7 x weekly - 1-3 sets - 8-10 reps - 3" hold  - Supine 90/90 Alternating Toe Touch  - 3-4 x daily - 5-7 x weekly - 1-3 sets - 8-10 reps - 3" hold  "

## 2024-12-17 NOTE — PLAN OF CARE
"[facility]  Physical Therapy Direct Access Initial Evaluation    Name: Flora Edmond  Clinic Number: [id number]    Therapy Diagnosis:   Encounter Diagnoses   Name Primary?    Chronic bilateral low back pain without sciatica Yes    Decreased range of motion of lumbar spine     Weakness of trunk musculature      Physician: Alyce Ramsey NP    Physician Orders: PT Eval and Treat   Medical Diagnosis from Referral: Low Back Pain   Evaluation Date: 12/17/2024  Authorization Period Expiration: 12/17/2025  Plan of Care Expiration: 2/1/2025  Visit # / Visits authorized: 1/ 1; future visits pending  FOTO 1st follow up:  FOTO 2nd follow up:    Time In: 3:00 pm  Time Out: 3:55 pm  Total Billable Time: 55 minutes    Precautions: Standard    Subjective   Date of onset: Chronic  History of current condition - Trice is a 39 year old female that reports a chief complaint of Left hip and SIJ pain that has been previously treated by pelvic floor at [facility] but continues to persist at times. Patient states that she had an episode about a month ago of a "pulled" muscle with delayed onset of symptoms. Patient notes that she has episodes of pain in the past that feels like a "catch" in her Left low back/SIJ.     Past Medical History and Current Conditions:   See EMR Media for Patient Form    Past Medical History:   Diagnosis Date    Allergy     seasonal    BRCA1 negative August 2023    BRCA2 negative August 2023    Cervical rib     Colon cancer July 1st    Vitamin D deficiency      Flora Edmond  has a past surgical history that includes [place] tooth extraction (2007); Colonoscopy (N/A, 6/30/2023); Colonoscopy (N/A, 10/12/2023); and colonoscopy, screening, high risk patient (N/A, 10/25/2024).    Flora has a current medication list which includes the following prescription(s): apretude, atovaquone-proguanil, levonorgestrel, scopolamine, and valacyclovir, and the following Facility-Administered " Medications: levonorgestrel.    Review of patient's allergies indicates:   Allergen Reactions    Cat/feline products Itching        Imaging:   None    Prior Therapy: Yes, pelvic floor PT  Social History: Lives in [place], [place]   Occupation: [employer]  Prior Level of Function: Independent with all ADL/iADLs   Current Level of Function: Independent with all ADL/iADLs     Pain:  Current 2/10, worst 6/10, best 1/10   Location: bilateral low back, left hip, Left SIJ   Description: Aching, Dull, Tight, Deep, and Sharp  Aggravating Factors: Standing, Bending, Walking, Night Time, Morning, Extension, Flexing, Lifting, and Getting out of bed/chair  Easing Factors: lying down, heating pad, and rest    Pts goals: improve her low back and Left hip pain    Objective     Observation: patient with decreased glute mass on the Left side    Posture:  lumbar lordosis most prominent at the L4/L5    Gait: bilateral hip drop during stance phase of gait     Functional Movements:   Functional Squat: 10 reps, increased lumbar flexion at end range squat      Heel Walks (L4/L5): 20 feet  Toe Walks (L5/S1): 20 feet      Single Leg Stance:   - Right 30 seconds   Quality: lateral trunk lean   - Left 30  seconds   Quality:  lateral trunk lean with Add/IR    Deep Tendon Reflex Right  Left    Achilles Reflex (L5/S1) 2+ 2+   Patellar Reflex (L2-L4) 2+ 2+   Babinski Reflex - -   Clonus - -        Thoraco-Lumbar Range of Motion:    Limitations (%) Pain Observation   Flexion 10%   NO     Extension 10%   NO  Hinge L4   Left Side Bending 10% NO     Right Side Bending 10% YES   Left SIJ pain   Left Rotation 15% YES      Right Rotation 15% NO         Lower Extremity Strength  Right LE  Left LE    Quadriceps: 5/5 Quadriceps: 5/5   Hamstrings: 4/5 Hamstrings: 4/5   Iliospoas: 4/5 Iliospoas: 4/5   Hip extension:  4/5 Hip extension: 4-/5   PGM: 4-/5 PGM: 3+/5   Hip ER:  3+/5 Hip ER: 3/5   Hip IR: 4/5 Hip IR: 4/5     Special Tests:   Observation/Result    Repeated Flexion -   Repeated Extension -   Sacral Rocking -   Lumbar Compression Test + hinge L4   Lumbar Protective Mechanism + delayed   Prone LE Extension + hinge L4/5     Sacroiliac Joint Screen  SIJ  Right  Left    Thigh Thrust - -   Distraction - -   Sacral Thrust - +   SIJ Mobility     Flick Test - +   Standing Flexion - +     Hip Screen  HIP Right  Left    PRECIOUS - -   DEBRA - -   Hip Scour - -     Hip Abduction Test:   - Right 1/3  - Left 2/3     Positive finding: inability to control pelvis in the frontal plain.    0 = no signs of poor movement control  1 = minimal loss of control as noted through signs of wobble  2 = moderate loss of control with at least 2 deviations  3 = severe loss of control with at least 3 deviations and requires hand on mat to stabilize    Movement System Impairment Screen:   MSI Observation    Bent Knee Fall Out +   Prone Knee Bend -   Alternating March +   Hand Heel Rock Relief and stretch       Neural Tension Testing:   Slump: (-)  SLR: (-)    Mobility Testing:  -Shear testing:   (+) L3/L4  -Segmental mobility testing:  Limited L4/L5  Reproducing familiar pain:   -Short to long with long sitting test: (-) anterior rotated innominate on R/L    Palpation:   -Erector Spinae: tonic  -Multifidi activation: poor    Flexibility: (+)   Ely's test: R = (-) ; L = (+)   Hamstrings: R = (-) ; L = (-)   Sam test: R = (-) ; L = (-)      Intake Outcome Measure for FOTO Lumbar Survey    Therapist reviewed FOTO scores for Flora Edmond on 12/17/2024.   FOTO documents entered into [employer] - see Media section.    Intake Score: 44%  Predicted Score: 30%    Primary Measure Score Range Intake Score Score Interpretation  Modified Oswestry LBP Disability Ques 100% - 0%   20.0 Higher Score = Greater Disability       TREATMENT   Treatment Time In: 3:35 pm  Treatment Time Out: 3:55 pm  Total Treatment time separate from Evaluation: 20 minutes    Trice participated in dynamic functional therapeutic  "activities to improve functional performance for 10  minutes, including:  Access Code: [email or web address]  URL: [email or web address]  Date: 12/17/2024  Prepared by: Lucius Schulz    Exercises  - Bird Dog  - 3-4 x daily - 5-7 x weekly - 1-3 sets - 8-10 reps - 3" hold  - Sidelying Hip External Rotation on Table  - 3-4 x daily - 5-7 x weekly - 1-3 sets - 8-10 reps - 3" hold  - Supine 90/90 Alternating Toe Touch  - 3-4 x daily - 5-7 x weekly - 1-3 sets - 8-10 reps - 3" hold     Trice received the following manual therapy techniques: Joint mobilizations were applied to the: Lumbar spine for 10 minutes, including:  Left SIJ HVLA  Left Hip HVLA  Left L4/L5 HVLA    Home Exercises and Patient Education Provided    Education provided re: prognosis, activity modification, goals for therapy, role of therapy for care, exercises/HEP    Written Home Exercises Provided: Yes.  Exercises were reviewed and Trice was able to demonstrate them prior to the end of the session.   Pt received a written copy of exercises to perform at home. Trice demonstrated good understanding of the education provided.     See EMR under patient instructions for exercises given.   Assessment   Flora is a 39 y.o. female referred to outpatient Physical Therapy with a medical diagnosis of low back pain. 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     Pt prognosis is Good.   Pt will benefit from skilled outpatient Physical Therapy to address the deficits stated above and in the chart below, provide pt/family education, and to maximize pt's level of independence.     Plan of care discussed with patient: Yes  Patient's spiritual, cultural and educational needs considered and patient is agreeable to the plan of care and goals as stated below:     Anticipated Barriers for therapy: none    Medical Necessity is " demonstrated by the following  History  Co-morbidities and personal factors that may impact the plan of care [] LOW: no personal factors / co-morbidities  [x] MODERATE: 1-2 personal factors / co-morbidities  [] HIGH: 3+ personal factors / co-morbidities    Moderate / High Support Documentation:   Allergy    seasonal   BRCA1 negative    BRCA2 negative    Cervical rib    Colon cancer    Vitamin D deficiency         Examination  Body Structures and Functions, activity limitations and participation restrictions that may impact the plan of care [] LOW: addressing 1-2 elements  [x] MODERATE: 3+ elements  [] HIGH: 4+ elements (please support below)    Moderate / High Support Documentation:   Left Hip Mobility  Lumbar motor control   SIJ Dysfunction     Clinical Presentation [] LOW: stable  [x] MODERATE: Evolving  [] HIGH: Unstable     Decision Making/ Complexity Score: moderate       Goals:  Short Term Goals:  5 weeks  1.Report decreased low back/hip pain  < / =  0/10  to increase tolerance for sitting for long bouts.  2. Increase ROM by 10 degrees where limited in order to perform ADLs without difficulty.  3. Increase strength by 1/3 MMT grade in deep ER  to increase tolerance for ADL and work activities.  4. Pt to tolerate HEP to improve ROM and independence with ADL's    Long Term Goals: 10 weeks  1.Report decreased low back/hip pain < / = 0/10  to increase tolerance for return to regular activity.  2.Patient goal: improve low back/Left buttock pain for prolonged flights/holiday travel and regular exercise.  3.Increase strength to 4+/5 in  deep external rotators and gluteus medius  to increase tolerance for ADL and work activities.  4. Pt will report at 30% score on FOTO  to demonstrate increase in LE function with every day tasks.       Plan   Plan of care Certification: 12/17/2024 to 2/1/2025.    Outpatient Physical Therapy 1-2 times weekly for 10-12 weeks to include the following interventions: Cervical/Lumbar  Traction, Electrical Stimulation as needed, Gait Training, Manual Therapy, Moist Heat/ Ice, Neuromuscular Re-ed, Orthotic Management and Training, Patient Education, Self Care, Therapeutic Activities, and Therapeutic Exercise, Blood Flow Restriction Training, Trigger Point Dry Needling as needed.      Lucius Schulz PT, DPT  Board Certified Orthopaedic Clinical Specialist

## 2024-12-17 NOTE — PROGRESS NOTES
Assessment:       1. Subacute cough    2. Viral URI with cough         Plan:       Subacute cough  -     X-Ray Chest PA And Lateral    Viral URI with cough  -     X-Ray Chest PA And Lateral    Cough likely postviral.  Chest x-ray.  Rule out pneumonia.  Symptomatic treatment at this time.\    Assessment & Plan    IMPRESSION:  - Considered potential pneumonia or bronchitis due to persistent cough and fatigue for 2 weeks  - Performed lung auscultation; lungs sound clear, no wheezing detected  - Will order chest XR to rule out silent pneumonia despite clear lung sounds  - Considered mycoplasma pneumonia as a possibility, noting it is currently prevalent  - Assessed for signs of sinus infection; found no significant facial pressure or ear involvement  - Determined to avoid immediate antibiotic treatment pending x-ray results, advocating for conservative approach to prevent antibiotic resistance    ACUTE UPPER RESPIRATORY INFECTION:  - Viral infections typically last 3-5 days, with severe cases potentially lasting up to 7 days.  - Bacterial infections often improve then worsen or linger, usually accompanied by general malaise.  - Possibility of silent pneumonia presenting with prolonged symptoms without clear auscultation findings.  - Concerning symptoms would include recurrence of fever or development of dyspnea.  - Flora to rest and hydrate.  - Flora to monitor for new onset of fever.  - Flora to watch for development of dyspnea.  - Started Azithromycin (Z-Jesús) if needed, pending x-ray results and symptom progression.  - Chest XR ordered to rule out pneumonia.    FOLLOW-UP:  - Contact the office if symptoms worsen, particularly if fever develops.  - Follow up if symptoms continue or become more concerning.       Medication List with Changes/Refills   Current Medications    APRETUDE 600 MG/3 ML (200 MG/ML) SPSR IM INJECTION    SMARTSI Syringe(s) IM    ATOVAQUONE-PROGUANIL (MALARONE) 250-100 MG TAB    Take  one tablet daily for malaria prevention. Begin one day before entering malarious area and continue for 1 week after return.    LEVONORGESTREL (MIRENA) 21 MCG/24 HOURS (8 YRS) 52 MG IUD    by Intrauterine route.    SCOPOLAMINE (TRANSDERM-SCOP) 1.3-1.5 MG (1 MG OVER 3 DAYS)    Place 1 patch onto the skin every 72 hours.    VALACYCLOVIR (VALTREX) 1000 MG TABLET    Take 1 tablet (1,000 mg total) by mouth once daily.         Subjective:    Patient ID: Flora Edmond is a 39 y.o. female.  Chief Complaint: Cough and Nasal Congestion    History of Present Illness    CHIEF COMPLAINT:  Flora presents today for persistent cough and fatigue following a recent illness.    RECENT ILLNESS:  She reports an illness that began a few weeks ago, requiring a week off work. She experienced a low-grade fever (max 99°F), extreme fatigue, and a mild sore throat initially. She denies experiencing any high fever or significant coughing at the onset of symptoms.    RESPIRATORY SYMPTOMS:  She reports a persistent cough that developed towards the end of the initial infection, lasting for two weeks. Initially productive, the cough has become less so over time. She experiences severe coughing fits that are difficult to stop once they begin, with varying duration and intensity. She reports difficulty taking full breaths, describing that expanding her lungs fully feels unnatural and effortful. Occasional episodes of severe coughing lead to feelings of breathlessness and near-vomiting sensations. These coughing fits, while infrequent throughout the day, are intense and difficult to stop once started.    FATIGUE:  She reports ongoing fatigue following the recent illness. Although the acute symptoms have improved, she continues to feel tired intermittently, catching her off guard occasionally.    ALLERGIES:  She reports having allergies with chronic congestion, which she feels has increased compared to her baseline.    MEDICAL HISTORY:  She  "denies history of asthma or lung disease.       Review of Systems   Constitutional:  Negative for chills and fever.   HENT:  Negative for ear pain, nosebleeds, sinus pressure and sore throat.    Respiratory:  Positive for cough. Negative for shortness of breath.    Cardiovascular:  Negative for chest pain and palpitations.   Gastrointestinal:  Negative for diarrhea, nausea and vomiting.   Genitourinary: Negative.    Psychiatric/Behavioral:  Negative for dysphoric mood and sleep disturbance. The patient is not nervous/anxious.        Objective:      Vitals:    12/04/24 1409   BP: 110/66   BP Location: Right arm   Patient Position: Sitting   Pulse: 93   Resp: 15   Temp: 98.2 °F (36.8 °C)   TempSrc: Temporal   SpO2: 96%   Weight: 68.2 kg (150 lb 3.9 oz)   Height: 5' 5" (1.651 m)     BP Readings from Last 5 Encounters:   12/04/24 110/66   10/25/24 122/68   09/25/24 136/86   08/01/24 (!) 103/58   07/25/24 117/81     Wt Readings from Last 5 Encounters:   12/04/24 68.2 kg (150 lb 3.9 oz)   10/25/24 65.8 kg (145 lb)   09/25/24 65.7 kg (144 lb 13.5 oz)   09/24/24 65.8 kg (145 lb)   08/01/24 64.5 kg (142 lb 4.9 oz)     Physical Exam  Constitutional:       General: She is not in acute distress.     Appearance: Normal appearance. She is not ill-appearing.   HENT:      Head: Normocephalic.      Mouth/Throat:      Mouth: Mucous membranes are moist.      Pharynx: No pharyngeal swelling or oropharyngeal exudate.      Tonsils: No tonsillar exudate.   Eyes:      Conjunctiva/sclera:      Right eye: Right conjunctiva is not injected.      Left eye: Left conjunctiva is not injected.   Cardiovascular:      Rate and Rhythm: Normal rate and regular rhythm.      Pulses:           Radial pulses are 1+ on the right side and 1+ on the left side.      Heart sounds: No murmur heard.     No systolic murmur is present.      No diastolic murmur is present.   Pulmonary:      Effort: No tachypnea or bradypnea.      Breath sounds: Normal breath " sounds. No decreased breath sounds, wheezing, rhonchi or rales.   Abdominal:      General: There is no distension.   Musculoskeletal:      Right lower leg: No edema.      Left lower leg: No edema.   Skin:     General: Skin is warm and dry.   Neurological:      Mental Status: She is alert.   Psychiatric:         Attention and Perception: Attention normal.         Speech: Speech normal.         Behavior: Behavior normal.           Lab Results   Component Value Date    WBC 4.44 07/26/2024    HGB 13.1 07/26/2024    HCT 41.2 07/26/2024     07/26/2024    CHOL 174 08/12/2015    TRIG 130 08/12/2015    HDL 59 08/12/2015    ALT 11 07/26/2024    AST 16 07/26/2024     (L) 07/26/2024    K 4.1 07/26/2024     07/26/2024    CREATININE 0.7 07/26/2024    BUN 7 07/26/2024    CO2 26 07/26/2024    TSH 1.378 06/23/2023    HGBA1C 4.8 07/11/2023      This note was generated with the assistance of ambient listening technology. Verbal consent was obtained by the patient and accompanying visitor(s) for the recording of patient appointment to facilitate this note. I attest to having reviewed and edited the generated note for accuracy, though some syntax or spelling errors may persist. Please contact the author of this note for any clarification.

## 2025-01-26 ENCOUNTER — PATIENT MESSAGE (OUTPATIENT)
Dept: PRIMARY CARE CLINIC | Facility: CLINIC | Age: 40
End: 2025-01-26
Payer: COMMERCIAL

## 2025-01-27 ENCOUNTER — HOSPITAL ENCOUNTER (OUTPATIENT)
Dept: RADIOLOGY | Facility: HOSPITAL | Age: 40
Discharge: HOME | End: 2025-01-27
Attending: STUDENT IN AN ORGANIZED HEALTH CARE EDUCATION/TRAINING PROGRAM
Payer: COMMERCIAL

## 2025-01-27 DIAGNOSIS — C18.7 MALIGNANT NEOPLASM OF SIGMOID COLON: ICD-10-CM

## 2025-01-27 DIAGNOSIS — B00.9 HSV-1 (HERPES SIMPLEX VIRUS 1) INFECTION: ICD-10-CM

## 2025-01-27 PROCEDURE — 25500020 PHARM REV CODE 255: Performed by: STUDENT IN AN ORGANIZED HEALTH CARE EDUCATION/TRAINING PROGRAM

## 2025-01-27 PROCEDURE — 71260 CT THORAX DX C+: CPT | Mod: 26,,, | Performed by: RADIOLOGY

## 2025-01-27 PROCEDURE — 71260 CT THORAX DX C+: CPT | Mod: TC

## 2025-01-27 PROCEDURE — A9698 NON-RAD CONTRAST MATERIALNOC: HCPCS | Performed by: STUDENT IN AN ORGANIZED HEALTH CARE EDUCATION/TRAINING PROGRAM

## 2025-01-27 PROCEDURE — 74177 CT ABD & PELVIS W/CONTRAST: CPT | Mod: 26,,, | Performed by: RADIOLOGY

## 2025-01-27 RX ORDER — VALACYCLOVIR HYDROCHLORIDE 1 G/1
1000 TABLET, FILM COATED ORAL DAILY
Qty: 30 TABLET | Refills: 11 | Status: SHIPPED | OUTPATIENT
Start: 2025-01-27 | End: 2026-01-27

## 2025-01-27 RX ADMIN — IOHEXOL 75 ML: 350 INJECTION, SOLUTION INTRAVENOUS at 03:01

## 2025-01-27 RX ADMIN — BARIUM SULFATE 450 ML: 20 SUSPENSION ORAL at 01:01

## 2025-01-30 ENCOUNTER — PATIENT MESSAGE (OUTPATIENT)
Dept: OPTOMETRY | Facility: CLINIC | Age: 40
End: 2025-01-30
Payer: COMMERCIAL

## 2025-01-30 ENCOUNTER — TELEPHONE (OUTPATIENT)
Dept: OPTOMETRY | Facility: CLINIC | Age: 40
End: 2025-01-30
Payer: COMMERCIAL

## 2025-01-30 DIAGNOSIS — H04.123 DRY EYE SYNDROME, BILATERAL: Primary | ICD-10-CM

## 2025-01-30 NOTE — TELEPHONE ENCOUNTER
Scheduled pt for overdue dry eye f/u and Mrx. Informed pt to start using drops consistently. New rx was sent to pharmacy.

## 2025-02-03 ENCOUNTER — OFFICE VISIT (OUTPATIENT)
Dept: HEMATOLOGY/ONCOLOGY | Facility: CLINIC | Age: 40
End: 2025-02-03
Payer: COMMERCIAL

## 2025-02-03 VITALS
WEIGHT: 146.94 LBS | SYSTOLIC BLOOD PRESSURE: 113 MMHG | HEIGHT: 65 IN | HEART RATE: 78 BPM | DIASTOLIC BLOOD PRESSURE: 80 MMHG | OXYGEN SATURATION: 98 % | BODY MASS INDEX: 24.48 KG/M2

## 2025-02-03 DIAGNOSIS — C18.7 MALIGNANT NEOPLASM OF SIGMOID COLON: Primary | ICD-10-CM

## 2025-02-03 DIAGNOSIS — E55.9 VITAMIN D DEFICIENCY: ICD-10-CM

## 2025-02-03 DIAGNOSIS — Z91.89 AT HIGH RISK FOR BREAST CANCER: ICD-10-CM

## 2025-02-03 DIAGNOSIS — D50.0 IRON DEFICIENCY ANEMIA DUE TO CHRONIC BLOOD LOSS: ICD-10-CM

## 2025-02-03 DIAGNOSIS — M25.551 RIGHT HIP PAIN: ICD-10-CM

## 2025-02-03 PROCEDURE — G2211 COMPLEX E/M VISIT ADD ON: HCPCS | Mod: S$GLB,,, | Performed by: INTERNAL MEDICINE

## 2025-02-03 PROCEDURE — 99999 PR PBB SHADOW E&M-EST. PATIENT-LVL III: CPT | Mod: PBBFAC,,, | Performed by: INTERNAL MEDICINE

## 2025-02-03 PROCEDURE — 1159F MED LIST DOCD IN RCRD: CPT | Mod: CPTII,S$GLB,, | Performed by: INTERNAL MEDICINE

## 2025-02-03 PROCEDURE — 3008F BODY MASS INDEX DOCD: CPT | Mod: CPTII,S$GLB,, | Performed by: INTERNAL MEDICINE

## 2025-02-03 PROCEDURE — 3079F DIAST BP 80-89 MM HG: CPT | Mod: CPTII,S$GLB,, | Performed by: INTERNAL MEDICINE

## 2025-02-03 PROCEDURE — 3074F SYST BP LT 130 MM HG: CPT | Mod: CPTII,S$GLB,, | Performed by: INTERNAL MEDICINE

## 2025-02-03 PROCEDURE — 99214 OFFICE O/P EST MOD 30 MIN: CPT | Mod: S$GLB,,, | Performed by: INTERNAL MEDICINE

## 2025-02-03 NOTE — PROGRESS NOTES
MEDICAL ONCOLOGY - ESTABLISHED PATIENT VISIT    Reason for visit: Colon cancer    Best Contact Phone Number(s): [phone] (home)      Cancer/Stage/TNM:    Cancer Staging   Malignant neoplasm of sigmoid colon  Staging form: Colon and Rectum, AJCC 8th Edition  - Pathologic stage from 7/20/2023: Stage IIA (pT3, pN0, cM0) - Signed by Surinder Youssef MD on 10/26/2023       Oncology History   Malignant neoplasm of sigmoid colon   7/6/2023 Initial Diagnosis    Malignant neoplasm of sigmoid colon     7/20/2023 Cancer Staged    Staging form: Colon and Rectum, AJCC 8th Edition  - Pathologic stage from 7/20/2023: Stage IIA (pT3, pN0, cM0)          Interim History:   39 y.o. female with thoracic outlet syndrome and sigmoid colon cancer s/p laparoscopic sigmoidectomy with Dr. Nesbitt on 7/15/23 who presents for follow-up while on surveillance.    She continues to feel generally well.  She has no new pain.  Continues to have occasional issues with R hip discomfort.  She has otherwise not noticed any change in her stools.  She did have an episode of BRBPR in December one day, has not occurred since.  No N/V.    Last [facility] ctDNA was 1/25/25 and was negative.    She presents alone today.  ECOG PS 0.    ROS:   Review of Systems   Constitutional:  Negative for malaise/fatigue and weight loss.   HENT:  Negative for hearing loss, sore throat and tinnitus.    Eyes:  Negative for blurred vision and pain.   Respiratory:  Negative for cough and shortness of breath.    Cardiovascular:  Negative for chest pain and leg swelling.   Gastrointestinal:  Negative for abdominal pain, constipation, diarrhea, nausea and vomiting.   Genitourinary:  Negative for dysuria, frequency and urgency.   Musculoskeletal:  Negative for back pain, falls and myalgias.   Skin:  Negative for rash.   Neurological:  Negative for weakness and headaches.   Endo/Heme/Allergies:  Does not bruise/bleed easily.   Psychiatric/Behavioral:  Negative for depression.  The patient is not nervous/anxious.    All other systems reviewed and are negative.      Past Medical History:   Past Medical History:   Diagnosis Date    Allergy     seasonal    BRCA1 negative August 2023    BRCA2 negative August 2023    Cervical rib     Colon cancer July 1st    Vitamin D deficiency         Past Surgical History:   Past Surgical History:   Procedure Laterality Date    COLONOSCOPY N/A 6/30/2023    Procedure: COLONOSCOPY;  Surgeon: Andriy Vargas MD;  Location: [facility] ENDOSCOPY;  Service: Endoscopy;  Laterality: N/A;  Sutab  Prep instructions given to pt in clinic -    COLONOSCOPY N/A 10/12/2023    Procedure: COLONOSCOPY;  Surgeon: Alda Partida MD;  Location: [facility] ([address] [place] [address]);  Service: Endoscopy;  Laterality: N/A;  8/21- referred to Dr. Partida/ Prep instructions to [employer]. AS.pt request sooner appt with the first available with any provider. r/s 10.13.23 with  at [facility]  8/30-pt to be done with Dr. Partida-MS  10/5-precall complete-MS    COLONOSCOPY, SCREENING, HIGH RISK PATIENT N/A 10/25/2024    Procedure: COLONOSCOPY, SCREENING, HIGH RISK PATIENT;  Surgeon: Alda Partida MD;  Location: [address]);  Service: Endoscopy;  Laterality: N/A;  referral carly/ prep inst sent to pt via portal / [employer] pt request/  10/17-procedure prep instructions sent via portal-mk  10/18-pre call complete-confirmed new arrival time 645am-new instr via portal-tb    WISDOM TOOTH EXTRACTION  2007        Family History:   Family History   Problem Relation Name Age of Onset    Breast cancer Maternal Grandmother Lieberman 61    Hemophilia Father Micheal Mayito         Factor IX    Prostate cancer Father Micheal Mayito 72        [place] 6 or 7    Hyperlipidemia Father Micheal Mayito     Coronary artery disease Father Micheal Mayito     Heart disease Father Micheal Mayito     Thyroid disease Mother Lilo Guillen     Breast cancer Mother Lilo Guillen 72        triple negative, CHEK2 VUS    Asthma Mother Lilo  Green     No Known Problems Sister      Prostate cancer Paternal Uncle Sam Edmond 55    Prostate cancer Paternal Grandfather Ravindra Edmond 70    COPD Paternal Grandfather Ravindra Edmond     Heart disease Paternal Grandfather Ravindra Edmond     Hemophilia Other      Breast cancer Other          dx @ 60s    Colon cancer Neg Hx      Ovarian cancer Neg Hx          Social History:   Social History     Tobacco Use    Smoking status: Former     Types: Vaping with nicotine    Smokeless tobacco: Never    Tobacco comments:     Stop smoking 2019   Substance Use Topics    Alcohol use: Yes     Alcohol/week: 5.0 standard drinks of alcohol     Types: 5 Glasses of wine per week     Comment: social        I have reviewed and updated the patient's past medical, surgical, family and social histories.    Allergies:   Review of patient's allergies indicates:   Allergen Reactions    Cat/feline products Itching        Medications:   Current Outpatient Medications   Medication Sig Dispense Refill    APRETUDE 600 mg/3 mL (200 mg/mL) SpSR IM injection SMARTSI Syringe(s) IM      levonorgestreL (MIRENA) 21 mcg/24 hours (8 yrs) 52 mg IUD by Intrauterine route.      perfluorohexyloctane, PF, 100 % Drop Apply 3 mLs to eye 4 (four) times daily. 3 mL 11    valACYclovir (VALTREX) 1000 MG tablet Take 1 tablet (1,000 mg total) by mouth once daily. 30 tablet 11    atovaquone-proguaniL (MALARONE) 250-100 mg Tab Take one tablet daily for malaria prevention. Begin one day before entering malarious area and continue for 1 week after return. (Patient not taking: Reported on 2/3/2025) 24 tablet 0    scopolamine (TRANSDERM-SCOP) 1.3-1.5 mg (1 mg over 3 days) Place 1 patch onto the skin every 72 hours. (Patient not taking: Reported on 2/3/2025) 10 patch 1     Current Facility-Administered Medications   Medication Dose Route Frequency Provider Last Rate Last Admin    levonorgestreL (Mirena) 52 mg IUD 1 Intra Uterine Device  1 Intra Uterine Device  "Intrauterine     1 Intra Uterine Device at 09/25/24 1115        Physical Exam:   /80 (Patient Position: Sitting)   Pulse 78   Ht 5' 5" (1.651 m)   Wt 66.7 kg (146 lb 15 oz)   SpO2 98%   BMI 24.45 kg/m²      ECOG Performance status: 0            Physical Exam  Vitals reviewed.   Constitutional:       General: She is not in acute distress.     Appearance: Normal appearance. She is normal weight.   HENT:      Head: Normocephalic and atraumatic.      Right Ear: External ear normal.      Left Ear: External ear normal.      Nose: Nose normal.      Mouth/Throat:      Mouth: Mucous membranes are moist.      Pharynx: Oropharynx is clear. No posterior oropharyngeal erythema.   Eyes:      General: No scleral icterus.     Extraocular Movements: Extraocular movements intact.      Conjunctiva/sclera: Conjunctivae normal.      Pupils: Pupils are equal, round, and reactive to light.   Cardiovascular:      Rate and Rhythm: Normal rate and regular rhythm.      Pulses: Normal pulses.      Heart sounds: Normal heart sounds.   Pulmonary:      Effort: Pulmonary effort is normal.      Breath sounds: Normal breath sounds. No wheezing or rales.   Abdominal:      General: Bowel sounds are normal. There is no distension.      Palpations: Abdomen is soft.      Tenderness: There is no abdominal tenderness.   Musculoskeletal:         General: No swelling. Normal range of motion.      Cervical back: Normal range of motion and neck supple.   Skin:     General: Skin is warm.      Coloration: Skin is not jaundiced.      Findings: No erythema or rash.   Neurological:      General: No focal deficit present.      Mental Status: She is alert and oriented to person, place, and time. Mental status is at baseline.      Gait: Gait normal.   Psychiatric:         Mood and Affect: Mood normal.         Behavior: Behavior normal.         Thought Content: Thought content normal.         Judgment: Judgment normal.           Labs:   No results found for " this or any previous visit (from the past 48 hours).     I have reviewed the pertinent labs from 1/27/25 which are notable for normal WBC, Hgb, platelets.  Normal LFTs and Cr.  CEA <1.7.    Imaging:       CT CAP - 1/27/25:    Impression:     Postoperative changes of sigmoidectomy for adenocarcinoma.  No evidence of recurrence.  No lymphadenopathy     Development of right upper lobe nodular opacity.  Metastasis not excluded.  Attention on follow-up.    Path:   Final Pathologic Diagnosis     1. Sigmoid colon, mass, biopsy:   Invasive adenocarcinoma.  VC      Comment: Interp By Herbert Beard M.D., Signed on 07/10/2023 at 10:18   Supplemental Diagnosis     Immunohistochemistry (IHC) Testing for Mismatch Repair (MMR) Proteins     MLH1   Intact nuclear expression     MSH2   Intact nuclear expression     MSH6   Intact nuclear expression     PMS2   Intact nuclear expression     Background nonneoplastic tissue/internal control with intact nuclear expression     IHC Interpretation   No loss of nuclear expression of MMR proteins: low probability of microsatellite instability-high (MSI-H)          7/15/23 Sigmoidectomy:     COLON AND RECTUM: Resection, Including Transanal Disk Excision of Rectal Neoplasms   COLON AND RECTUM: RESECTION - All Specimens   8th Edition - Protocol posted: 6/22/2022     SPECIMEN      Procedure:    Sigmoidectomy     TUMOR      Tumor Site:    Sigmoid colon      Histologic Type:    Adenocarcinoma      Histologic Grade:    G2, moderately differentiated      Tumor Size:    Greatest dimension (Centimeters): 5.5 cm      Tumor Extent:    Invades through muscularis propria into the pericolonic or perirectal tissue      Macroscopic Tumor Perforation:    Not identified      Lymphovascular Invasion:    Not identified      Perineural Invasion:    Not identified      Tumor Bud Score:    Low (0-4)      Treatment Effect:    No known presurgical therapy     MARGINS      Margin Status for Invasive Carcinoma:    All  "margins negative for invasive carcinoma        Closest Margin(s) to Invasive Carcinoma:    Proximal        Distance from Invasive Carcinoma to Closest Margin:    2.8 cm      Margin Status for Non-Invasive Tumor:    All margins negative for high-grade dysplasia / intramucosal carcinoma and low-grade dysplasia     REGIONAL LYMPH NODES      Regional Lymph Node Status:            :    All regional lymph nodes negative for tumor        Number of Lymph Nodes Examined:    55      Tumor Deposits:    Not identified     PATHOLOGIC STAGE CLASSIFICATION (pTNM, AJCC 8th Edition)      Reporting of pT, pN, and (when applicable) pM categories is based on information available to the pathologist at the time the report is issued. As per the AJCC (Chapter 1, 8th Ed.) it is the managing physician's responsibility to establish the final pathologic stage based upon all pertinent information, including but potentially not limited to this pathology report.      pT Category:    pT3      pN Category:    pN0            Assessment:       1. Malignant neoplasm of sigmoid colon    2. Right hip pain    3. Vitamin D deficiency    4. Iron deficiency anemia due to chronic blood loss    5. At high risk for breast cancer              Plan:           # Sigmoid colon cancer  She presented with several months of symptoms and was found to have a sigmoid colon adenocarcinoma, pMMR on 6/30/23 colonoscopy.  She had no clear evidence of distant metastatic disease on her pre-operative CT imaging. Saw Dr. Partida here and then went to [facility] for surgical consultation.  MRI there showed T4a N1 disease.  Underwent laparoscopic sigmoidectomy with Dr. Nesbitt on 7/15/23 without need for ostomy.  Regained bowel function and returned to [place] 7/18/23.    Pathology from sigmoidectomy showed pT3N0 tumor with 55 negative lymph nodes, no LVI or PNI, negative margins.  Low tumor budding score, no perforation.    I discussed with her that the data are mixed on "low " "risk" stage II colon adenocarcinoma, pMMR, but I generally recommend active surveillance alone.  Another reasonable approach is six months of single agent capecitabine or 5-FU/LV.  There is not a survival benefit from the addition of oxaliplatin in this setting as per the MOSAIC study.  She elected to proceed with active surveillance.    Completion colonoscopy 10/11/23 with Dr. Partida - 2 polyps removed.   Repeat colonoscopy 10/25/24 with Dr. Partida - One 5mm polyp removed in ascending colon.  Repeat due 10/2027.    She underwent ctDNA testing with [facility] on 7/24 with mobile phlebotomy. Has continued since then q3 months.  Most recent 1/2025 was negative.    CT CAP 1/27/25 personally reviewed and shows ARYA.    Plan to return in 6 months with CBC, CMP, CEA & CT CAP.    # R hip pain  Chronic, intermittent.  (+) for HLA B27.  Saw Dr. West who did not feel that her presentation was consistent with ankylosing spondylitis.  He reviewed her prior imaging.    # Vit D Deficiency  She is off vitamin D. Had some prior osteopenia that improved.    # Iron deficiency anemia  Resolved. Received IV venofer. Secondary to prior cancer.    # High risk for breast cancer  Following with high risk breast clinic.    Follow up: 6 months.    The above information has been reviewed with the patient and all questions have been answered to their apparent satisfaction.  They understand that they can call the clinic with any questions.    Surinder Youssef MD  Hematology/Oncology  [facility]      Route Chart for Scheduling    Med Onc Chart Routing      Follow up with physician 6 months. with labs and CT prior   Follow up with MIN    Infusion scheduling note    Injection scheduling note    Labs CBC, CMP and CEA   Scheduling:  Preferred lab:  Lab interval:     Imaging CT chest abdomen pelvis      Pharmacy appointment    Other referrals                    Therapy Plan Information  VENOFER (IRON SUCROSE) QW for Iron " deficiency anemia due to chronic blood loss, noted on 7/20/2023  Medications  iron sucrose (VENOFER) 300 mg in sodium chloride 0.9% 250 mL IVPB  300 mg, Intravenous, 1 time a week  Anaphylaxis/Hypersensitivity  EPINEPHrine (EPIPEN) 0.3 mg/0.3 mL pen injection 0.3 mg  0.3 mg, Intramuscular, PRN  diphenhydrAMINE injection 50 mg  50 mg, Intravenous, PRN  hydrocortisone sodium succinate injection 100 mg  100 mg, Intravenous, PRN  Flushes  sodium chloride 0.9% 250 mL flush bag  Intravenous, 1 time a week  sodium chloride 0.9% flush 10 mL  10 mL, Intravenous, 1 time a week  heparin, porcine (PF) 100 unit/mL injection flush 500 Units  500 Units, Intravenous, 1 time a week  alteplase injection 2 mg  2 mg, Intra-Catheter, 1 time a week      No therapy plan of the specified type found.    No therapy plan of the specified type found.

## 2025-02-06 ENCOUNTER — OFFICE VISIT (OUTPATIENT)
Dept: NEUROSURGERY | Facility: CLINIC | Age: 40
End: 2025-02-06
Payer: COMMERCIAL

## 2025-02-06 VITALS
BODY MASS INDEX: 24.47 KG/M2 | DIASTOLIC BLOOD PRESSURE: 82 MMHG | WEIGHT: 147.06 LBS | HEART RATE: 69 BPM | SYSTOLIC BLOOD PRESSURE: 115 MMHG | TEMPERATURE: 99 F

## 2025-02-06 DIAGNOSIS — M54.2 NECK PAIN: ICD-10-CM

## 2025-02-06 DIAGNOSIS — M54.9 DORSALGIA, UNSPECIFIED: ICD-10-CM

## 2025-02-06 DIAGNOSIS — M47.816 LUMBAR SPONDYLOSIS: Primary | ICD-10-CM

## 2025-02-06 DIAGNOSIS — Q76.49 BERTOLOTTI SYNDROME: ICD-10-CM

## 2025-02-06 PROCEDURE — 3074F SYST BP LT 130 MM HG: CPT | Mod: CPTII,S$GLB,, | Performed by: NURSE PRACTITIONER

## 2025-02-06 PROCEDURE — 3079F DIAST BP 80-89 MM HG: CPT | Mod: CPTII,S$GLB,, | Performed by: NURSE PRACTITIONER

## 2025-02-06 PROCEDURE — 3008F BODY MASS INDEX DOCD: CPT | Mod: CPTII,S$GLB,, | Performed by: NURSE PRACTITIONER

## 2025-02-06 PROCEDURE — 1159F MED LIST DOCD IN RCRD: CPT | Mod: CPTII,S$GLB,, | Performed by: NURSE PRACTITIONER

## 2025-02-06 PROCEDURE — 99204 OFFICE O/P NEW MOD 45 MIN: CPT | Mod: S$GLB,,, | Performed by: NURSE PRACTITIONER

## 2025-02-06 PROCEDURE — 1160F RVW MEDS BY RX/DR IN RCRD: CPT | Mod: CPTII,S$GLB,, | Performed by: NURSE PRACTITIONER

## 2025-02-06 PROCEDURE — 99999 PR PBB SHADOW E&M-EST. PATIENT-LVL IV: CPT | Mod: PBBFAC,,, | Performed by: NURSE PRACTITIONER

## 2025-02-06 NOTE — PROGRESS NOTES
Neurosurgery  History & Physical    SUBJECTIVE:      History of Present Illness: Flora Edmond is a 39 y.o. female being seen in clinic today to discuss her recent CT findings from 25. 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 Pseudoarthrosis of the right aspect of L5 with the sacrum. The patient has a hx of thoracic outlet syndrome. Regarding her back pain, she started having pain when she was 21 years old on the right side. Her current pain is more localized to the left-side of the low back into the buttock. Denies significant leg pain, weakness, b/b dysfunction, saddle anesthesia, or gait instability. She is currently in PT.     Review of patient's allergies indicates:   Allergen Reactions    Cat/feline products Itching       Current Outpatient Medications   Medication Sig Dispense Refill    APRETUDE 600 mg/3 mL (200 mg/mL) SpSR IM injection SMARTSI Syringe(s) IM      atovaquone-proguaniL (MALARONE) 250-100 mg Tab Take one tablet daily for malaria prevention. Begin one day before entering malarious area and continue for 1 week after return. 24 tablet 0    levonorgestreL (MIRENA) 21 mcg/24 hours (8 yrs) 52 mg IUD by Intrauterine route.      perfluorohexyloctane, PF, 100 % Drop Apply 3 mLs to eye 4 (four) times daily. 3 mL 11    scopolamine (TRANSDERM-SCOP) 1.3-1.5 mg (1 mg over 3 days) Place 1 patch onto the skin every 72 hours. 10 patch 1    valACYclovir (VALTREX) 1000 MG tablet Take 1 tablet (1,000 mg total) by mouth once daily. 30 tablet 11     Current Facility-Administered Medications   Medication Dose Route Frequency Provider Last Rate Last Admin    levonorgestreL (Mirena) 52 mg IUD 1 Intra Uterine Device  1 Intra Uterine Device Intrauterine     1 Intra Uterine Device at 24 1115       Past Medical History:   Diagnosis Date    Allergy     seasonal    BRCA1 negative 2023    BRCA2 negative 2023    Cervical rib     Colon cancer      Vitamin D deficiency      Past Surgical History:   Procedure Laterality Date    COLONOSCOPY N/A 6/30/2023    Procedure: COLONOSCOPY;  Surgeon: Andriy Vargas MD;  Location: [facility] ENDOSCOPY;  Service: Endoscopy;  Laterality: N/A;  Sutab  Prep instructions given to pt in clinic -    COLONOSCOPY N/A 10/12/2023    Procedure: COLONOSCOPY;  Surgeon: Alda Partida MD;  Location: [facility] ([address] [place] [address]);  Service: Endoscopy;  Laterality: N/A;  8/21- referred to Dr. Partida/ Prep instructions to portal. AS.pt request sooner appt with the first available with any provider. r/s 10.13.23 with  at [facility]  8/30-pt to be done with Dr. Partida-MS  10/5-precall complete-MS    COLONOSCOPY, SCREENING, HIGH RISK PATIENT N/A 10/25/2024    Procedure: COLONOSCOPY, SCREENING, HIGH RISK PATIENT;  Surgeon: Alda Partida MD;  Location: [address]);  Service: Endoscopy;  Laterality: N/A;  referral carly/ prep inst sent to pt via portal / [employer] pt request/  10/17-procedure prep instructions sent via portal-mkp  10/18-pre call complete-confirmed new arrival time 645am-new instr via portal-tb    WISDOM TOOTH EXTRACTION  2007     Family History       Problem Relation (Age of Onset)    Asthma Mother    Breast cancer Maternal Grandmother (61), Mother (72), Other    COPD Paternal Grandfather    Coronary artery disease Father    Heart disease Father, Paternal Grandfather    Hemophilia Father, Other    Hyperlipidemia Father    No Known Problems Sister    Prostate cancer Father (72), Paternal Uncle (55), Paternal Grandfather (70)    Thyroid disease Mother          Social History     Socioeconomic History    Marital status: Single   Tobacco Use    Smoking status: Former     Types: Vaping with nicotine    Smokeless tobacco: Never    Tobacco comments:     Stop smoking 2019   Substance and Sexual Activity    Alcohol use: Yes     Alcohol/week: 5.0 standard drinks of alcohol     Types: 5 Glasses of wine per week     Comment:  social    Drug use: No    Sexual activity: Yes     Partners: Male     Birth control/protection: Condom, I.U.D.   Other Topics Concern    Are you pregnant or think you may be? No    Breast-feeding No   Social History Narrative    Graduated with masters in public health in May 2015    Currently seeking employment    Working at [employer] as  in [employer]    Originally from [place]    Moved to [facility] in 2013, lives alone         Social Drivers of Health     Financial Resource Strain: Low Risk  (1/22/2024)    Overall Financial Resource Strain (CARDIA)     Difficulty of Paying Living Expenses: Not hard at all   Food Insecurity: No Food Insecurity (1/22/2024)    Hunger Vital Sign     Worried About Running Out of Food in the Last Year: Never true     Ran Out of Food in the Last Year: Never true   Transportation Needs: No Transportation Needs (1/22/2024)    PRAPARE - Transportation     Lack of Transportation (Medical): No     Lack of Transportation (Non-Medical): No   Physical Activity: Insufficiently Active (1/22/2024)    Exercise Vital Sign     Days of Exercise per Week: 1 day     Minutes of Exercise per Session: 30 min   Stress: No Stress Concern Present (1/22/2024)    [employer] [place] [employer] - Occupational Stress Questionnaire     Feeling of Stress : Only a little   Housing Stability: Low Risk  (1/22/2024)    Housing Stability Vital Sign     Unable to Pay for Housing in the Last Year: No     Number of Places Lived in the Last Year: 1     Unstable Housing in the Last Year: No       Review of Systems    OBJECTIVE:     Vital Signs  Temp: 98.6 °F (37 °C)  Pulse: 69  BP: 115/82  Pain Score: 0-No pain  Weight: 66.7 kg (147 lb 0.8 oz)  Body mass index is 24.47 kg/m².      Neurosurgery Physical Exam  General: well developed, well nourished, no distress.   Head: normocephalic, atraumatic  Neurologic: Alert and oriented. Thought content appropriate.  GCS: Motor: 6/Verbal: 5/Eyes: 4 GCS Total: 15  Mental  Status: Awake, Alert, Oriented x 4  Language: No aphasia  Speech: No dysarthria  Cranial nerves: face symmetric, tongue midline, CN II-XII grossly intact.   Eyes: pupils equal, round, reactive to light with accomodation, EOMI.   Pulmonary: normal respirations, no signs of respiratory distress  Abdomen: soft, non-distended  Skin: Skin is warm, dry and intact.  Sensory: intact to light touch throughout  Motor Strength:Moves all extremities spontaneously with good tone.   Strength  Deltoids Triceps Biceps Wrist Extension Wrist Flexion Hand    Upper: R 5/5 5/5 5/5 5/5 5/5 5/5    L 5/5 5/5 5/5 5/5 5/5 5/5     HF KE KF DF PF EHL   Lower: R 5/5 5/5 5/5 5/5 5/5 5/5    L 5/5 5/5 5/5 5/5 5/5 5/5     Reflexes:   Whittington's: Negative.     Cerebellar:   Gait stable, fluid.   Tandem Gait: No difficulty  Able to walk on heels & toes     Lumbar:  Midline TTP: Negative.  Straight Leg Test: Negative.    Other:  SI joint TTP: Positive LEFT    Diagnostic Results:  I have personally reviewed the CT chest abdomen and pelvis dated 1/27/25 with Dr. Cordoba as mentioned in the HPI.   ASSESSMENT/PLAN:     Flora Edmond is a 39 y.o. female seen in clinic today to discuss her recent CT findings from 1/27/25. After reviewing the imaging and concerns with Dr. Cordoba, he recommended injections for the Bertolotti syndrome. Surgery is not recommended at this time. MRI lumbar spine to further evaluate her left-sided low back pain and dynamic x-ray. I will notify her of the findings. We discussed that she should reach out to Dr. Wilburn regarding her TOS. She was agreeable.     Lisa Thibodeaux, STEVEP-C  Neurosurgery  [facility].      Note dictated with voice recognition software, please excuse any grammatical errors.

## 2025-02-12 ENCOUNTER — OFFICE VISIT (OUTPATIENT)
Dept: DERMATOLOGY | Facility: CLINIC | Age: 40
End: 2025-02-12
Payer: COMMERCIAL

## 2025-02-12 DIAGNOSIS — L90.5 SCAR: ICD-10-CM

## 2025-02-12 DIAGNOSIS — Z86.018 HISTORY OF DYSPLASTIC NEVUS: ICD-10-CM

## 2025-02-12 DIAGNOSIS — L30.9 DERMATITIS: Primary | ICD-10-CM

## 2025-02-12 PROCEDURE — 1159F MED LIST DOCD IN RCRD: CPT | Mod: CPTII,S$GLB,, | Performed by: DERMATOLOGY

## 2025-02-12 PROCEDURE — 99214 OFFICE O/P EST MOD 30 MIN: CPT | Mod: S$GLB,,, | Performed by: DERMATOLOGY

## 2025-02-12 PROCEDURE — 1160F RVW MEDS BY RX/DR IN RCRD: CPT | Mod: CPTII,S$GLB,, | Performed by: DERMATOLOGY

## 2025-02-12 RX ORDER — PIMECROLIMUS 10 MG/G
CREAM TOPICAL
Qty: 30 G | Refills: 1 | Status: SHIPPED | OUTPATIENT
Start: 2025-02-12

## 2025-02-12 NOTE — PROGRESS NOTES
Patient Information  Name: Flora Edmond  : 1985  MRN: [id number]     Referring Physician:  No ref. provider found   Primary Care Physician:  Glenn Vega MD   Date of Visit: 25      Subjective:     History of Present lllness:    Flora Edmond is a 39 y.o. female who presents with a chief complaint of rash.  Location: eyelid and corner of nose  Duration: 6 wks  Signs/Symptoms: peeling, cracking, itch, tender, red, bumps dry  Exacerbating factors: went to [place] over [place], states she always gets a little dry from the climate change but this was different. Also states no new products  Relieving factors/Prior treatments: OTC hydrocortisone, night cream and serums     She is also experiencing some intermittent pain in the scar on her back. Used silicone tape at the beginning.    Patient was last seen: 2024.  Prior notes by myself reviewed.   Clinical documentation obtained by nursing staff reviewed.    Review of Systems    Objective:   Physical Exam   Constitutional: She appears well-developed and well-nourished. No distress.   Neurological: She is alert and oriented to person, place, and time. She is not disoriented.   Psychiatric: She has a normal mood and affect.   Skin:   Areas Examined (abnormalities noted in diagram):   Head / Face Inspection Performed                 Diagram Legend     Erythematous scaling macule/papule c/w actinic keratosis       Vascular papule c/w angioma      Pigmented verrucoid papule/plaque c/w seborrheic keratosis      Yellow umbilicated papule c/w sebaceous hyperplasia      Irregularly shaped tan macule c/w lentigo     1-2 mm smooth white papules consistent with Milia      Movable subcutaneous cyst with punctum c/w epidermal inclusion cyst      Subcutaneous movable cyst c/w pilar cyst      Firm pink to brown papule c/w dermatofibroma      Pedunculated fleshy papule(s) c/w skin tag(s)      Evenly pigmented macule c/w junctional nevus     Mildly  variegated pigmented, slightly irregular-bordered macule c/w mildly atypical nevus      Flesh colored to evenly pigmented papule c/w intradermal nevus       Pink pearly papule/plaque c/w basal cell carcinoma      Erythematous hyperkeratotic cursted plaque c/w SCC      Surgical scar with no sign of skin cancer recurrence      Open and closed comedones      Inflammatory papules and pustules      Verrucoid papule consistent consistent with wart     Erythematous eczematous patches and plaques     Dystrophic onycholytic nail with subungual debris c/w onychomycosis     Umbilicated papule    Erythematous-base heme-crusted tan verrucoid plaque consistent with inflamed seborrheic keratosis     Erythematous Silvery Scaling Plaque c/w Psoriasis     See annotation    No images are attached to the encounter or orders placed in the encounter.      [] Data reviewed  [] Prior external notes reviewed  [] Independent review of test  [] Management discussed with another provider  [] Independent historian    Assessment / Plan:        Dermatitis  - new problem with uncertain prognosis  Differential diagnosis includes periorificial dermatitis vs seb derm.  -     pimecrolimus (ELIDEL) 1 % cream; Apply to affected areas of face BID prn irritation.  Dispense: 30 g; Refill: 1    Discontinue any topical, nasal, or inhaled corticosteroids.  Discontinue applying occlusive face creams, including cosmetics and sunscreens.  Avoid cinnamon.  Consider a trial of [employer] [place] Silly Strawberry fluoride-free toothpaste or Cleure Original flavor-free toothpaste.  Recommended washing with lukewarm water alone or using a mild, fragrance-free cleanser.    Scar  History of dysplastic nevus, severe atypia   - stable and chronic  Area(s) of previous dysplastic nevus evaluated with no evidence of recurrence. Reassurance provided.         Follow up in about 6 weeks (around 3/26/2025) for follow up, or sooner if symptoms worsening or not  improving.      Mesha Robledo MD, FAAD  [facility]

## 2025-02-13 DIAGNOSIS — H04.123 DRY EYE SYNDROME, BILATERAL: ICD-10-CM

## 2025-02-14 ENCOUNTER — OFFICE VISIT (OUTPATIENT)
Dept: OPTOMETRY | Facility: CLINIC | Age: 40
End: 2025-02-14
Payer: COMMERCIAL

## 2025-02-14 DIAGNOSIS — H04.123 DRY EYE SYNDROME, BILATERAL: Primary | ICD-10-CM

## 2025-02-14 DIAGNOSIS — H52.13 MYOPIA, BILATERAL: ICD-10-CM

## 2025-02-14 DIAGNOSIS — H10.13 ALLERGIC CONJUNCTIVITIS OF BOTH EYES: ICD-10-CM

## 2025-02-14 DIAGNOSIS — Z15.89 HLA B27 (HLA B27 POSITIVE): ICD-10-CM

## 2025-02-14 PROCEDURE — 99999 PR PBB SHADOW E&M-EST. PATIENT-LVL II: CPT | Mod: PBBFAC,,, | Performed by: OPTOMETRIST

## 2025-02-14 NOTE — PROGRESS NOTES
HPI    Meibo BID OU   Ivizia haven't started yet    Trice Edmond is a 39 y.o. female who comes in for routine eye exam.   Pt. States vision have remained stable. Pt. States Meibo drops have help   but eyes still dry.     (--)blurred vision(--)Headaches(--)diplopia  (--)flashes(--)floaters(--)pain  (--)Itching(--)tearing(--)burning  (+)Dryness(+) OTC Drops(+)Photophobia      Last edited by Jennifer Harris on 2/14/2025  1:14 PM.            Assessment /Plan     For exam results, see Encounter Report.    Dry eye syndrome, bilateral   Continue with Miebo BID-QID   Consider plugs in the future    Allergic conjunctivitis of both eyes    HLA B27 (HLA B27 positive)    Myopia, bilateral   Rx specs       RTC 1 year, sooner if wants contacts

## 2025-02-18 ENCOUNTER — NURSE TRIAGE (OUTPATIENT)
Dept: ADMINISTRATIVE | Facility: CLINIC | Age: 40
End: 2025-02-18
Payer: COMMERCIAL

## 2025-02-18 ENCOUNTER — PATIENT MESSAGE (OUTPATIENT)
Dept: INTERNAL MEDICINE | Facility: CLINIC | Age: 40
End: 2025-02-18

## 2025-02-18 ENCOUNTER — PATIENT MESSAGE (OUTPATIENT)
Dept: INTERNAL MEDICINE | Facility: CLINIC | Age: 40
End: 2025-02-18
Payer: COMMERCIAL

## 2025-02-18 ENCOUNTER — OFFICE VISIT (OUTPATIENT)
Dept: INTERNAL MEDICINE | Facility: CLINIC | Age: 40
End: 2025-02-18
Payer: COMMERCIAL

## 2025-02-18 ENCOUNTER — OCHSNER VIRTUAL EMERGENCY DEPARTMENT (OUTPATIENT)
Facility: CLINIC | Age: 40
End: 2025-02-18
Payer: COMMERCIAL

## 2025-02-18 DIAGNOSIS — J11.1 INFLUENZA: Primary | ICD-10-CM

## 2025-02-18 DIAGNOSIS — J06.9 URI WITH COUGH AND CONGESTION: Primary | ICD-10-CM

## 2025-02-18 RX ORDER — OSELTAMIVIR PHOSPHATE 75 MG/1
75 CAPSULE ORAL 2 TIMES DAILY
Qty: 10 CAPSULE | Refills: 0 | Status: SHIPPED | OUTPATIENT
Start: 2025-02-18 | End: 2025-02-23

## 2025-02-18 NOTE — PROGRESS NOTES
Subjective     Patient ID: Flora Edmond is a 39 y.o. female.    Chief Complaint: URI    HPI  Established pt of Glenn Vega MD (new to me)    The patient location is: Home  The chief complaint leading to consultation is: URI, fever    Visit type: audiovisual    Face to Face time with patient: 10  15 minutes of total time spent on the encounter, which includes face to face time and non-face to face time preparing to see the patient (eg, review of tests), Obtaining and/or reviewing separately obtained history, Documenting clinical information in the electronic or other health record, Independently interpreting results (not separately reported) and communicating results to the patient/family/caregiver, or Care coordination (not separately reported).         Each patient to whom he or she provides medical services by telemedicine is:  (1) informed of the relationship between the physician and patient and the respective role of any other health care provider with respect to management of the patient; and (2) notified that he or she may decline to receive medical services by telemedicine and may withdraw from such care at any time.    Notes:         Onset of symtpoms 2 nights ago  Chest congestion, fatigue, Mostly dry cough  Fever 101.3F. Some chills  Home COVID test have negative  Taking OTC cough syrup and ibuprofen    Answers submitted by the patient for this visit:  Cough Questionnaire (Submitted on 2/18/2025)  Chief Complaint: Cough  Chronicity: recurrent  Onset: yesterday  Progression since onset: gradually worsening  Frequency: every few minutes  Cough characteristics: non-productive  chest pain: Yes  chills: Yes  ear congestion: Yes  ear pain: No  fever: No  headaches: Yes  heartburn: No  hemoptysis: No  myalgias: Yes  nasal congestion: Yes  postnasal drip: Yes  rash: No  rhinorrhea: Yes  shortness of breath: No  sore throat: No  sweats: Yes  weight loss: No  wheezing: No  Aggravated by: lying  down  asthma: No  bronchiectasis: No  bronchitis: Yes  COPD: No  emphysema: No  environmental allergies: Yes  pneumonia: No  Treatments tried: OTC cough suppressant, body position changes, rest  Improvement on treatment: no relief        Review of Systems   Constitutional:  Positive for chills and fever.   HENT:  Positive for postnasal drip and rhinorrhea. Negative for ear pain and sore throat.    Respiratory:  Positive for cough. Negative for shortness of breath and wheezing.    Cardiovascular:  Positive for chest pain (from coughing).   Musculoskeletal:  Positive for myalgias.   Integumentary:  Negative for rash.   Allergic/Immunologic: Positive for environmental allergies.   Neurological:  Positive for headaches.          Objective    Physical Exam  Constitutional:       General: She is not in acute distress.     Appearance: She is not ill-appearing.   Pulmonary:      Effort: Pulmonary effort is normal. No respiratory distress.   Neurological:      Mental Status: She is alert.            Assessment and Plan     1. URI with cough and congestion        Conservative measures discussed   Increased fluids, tylenol/NSAIDs prn, OTC mucinex, rest, warm salt gargles  Recommend flu testing, pt states she will obtain an at-home test and let me know about results.   RTC if symptoms worsen or fail to improve    Addendum:    Pt msg home flu test is positive for Flu A  Tamiflu prescribed by on-call provider.       Nayeli Joe PA-C

## 2025-02-19 ENCOUNTER — PATIENT MESSAGE (OUTPATIENT)
Dept: NEUROSURGERY | Facility: CLINIC | Age: 40
End: 2025-02-19
Payer: COMMERCIAL

## 2025-02-19 DIAGNOSIS — M47.816 LUMBAR SPONDYLOSIS: Primary | ICD-10-CM

## 2025-02-19 DIAGNOSIS — H04.123 DRY EYE SYNDROME, BILATERAL: ICD-10-CM

## 2025-02-19 DIAGNOSIS — Q76.49 BERTOLOTTI SYNDROME: ICD-10-CM

## 2025-02-19 RX ORDER — LORAZEPAM 0.5 MG/1
0.5 TABLET ORAL ONCE AS NEEDED
Qty: 2 TABLET | Refills: 0 | Status: SHIPPED | OUTPATIENT
Start: 2025-02-19 | End: 2025-02-19

## 2025-02-19 NOTE — PLAN OF CARE-OVED
[facility]  Referral Source: Nurse On-Call                               Chief Complaint   Patient presents with    Influenza     Pt tested positive for the flu after PCP visit. Requesting Rx for tamiflu       Recommendation: Treat in place                       Recommendation comment: Offered e-visit for the patient, but states that they will wait for call back from PCP to get tamiflu Rx.    Will Rx for tamiflu. Instructed nurse on call to discuss minimal benefit of tamiflu for outpatients with mild illness or little risk of complications. Treatment is reserved for illness onset < 48 hours, where symptoms started about 2 nights ago per PCP visit. The evidence for Tamiflu effectiveness is not strong, with only about 1.6 days of improvement compared to placebo. I generally reserve Tamiflu for extremes of age, or those with high-risk of severe or progressive illness such as those with chronic lung disease or immunocompromised status. After RN discussion with the patient, will elect for Rx.      Encounter Diagnosis   Name Primary?    Influenza Yes        Orders Placed This Encounter    oseltamivir (TAMIFLU) 75 MG capsule     Sig: Take 1 capsule (75 mg total) by mouth 2 (two) times daily. for 5 days     Dispense:  10 capsule     Refill:  0

## 2025-02-19 NOTE — TELEPHONE ENCOUNTER
Patient states she was seen today in the clinic and diagnosed with a URI and Congestion. Patient states Provider recommended testing for influenza and to return to clinic if symptoms worsen. Patient states Provider advised to notify of the results of her test so that a prescription for Tamiflu can be submitted to her Pharmacy. Clinic is closed at this time and patient states she has messaged her Provider via My Chart and sent a copy of her positive influenza result. Patient is requesting a prescription for Tamiflu at this time.     Triage RN outreached [place] On Call Provider, Dr. Bubba Nevarez for assistance with patient's request for Tamiflu. Dr. Nevarez advised that he can complete an E-visit with patient for submission of a prescription for Tamiflu. Dr. Nevarez also advised that patients with mild illness or no risk of complications can benefit from symptomatic care and that treatment with Tamiflu is generally reserved for extreme ages or high risk populations at risk for progressive illness like Chronic Lung disease or immunocompromised status.    Patient provided care advice received by Dr. Nevarez and of invite to complete an E-visit. Patient states appreciation for care advice and declined E-Visit at this time.     Dr. Nevarez sent additional messaging advising that he has sent in a prescription for Tamiflu to patient's Pharmacy and patient advised that prescription has been sent. Patient states understanding.     Reason for Disposition   Prescription request for new medicine (not a refill)    Additional Information   Negative: [1] Intentional drug overdose AND [2] suicidal thoughts or ideas   Negative: Drug overdose and triager unable to answer question   Negative: Caller requesting a renewal or refill of a medicine patient is currently taking   Negative: Caller requesting information unrelated to medicine   Negative: Caller requesting information about COVID-19 Vaccine   Negative: Caller requesting information about  Emergency Contraception   Negative: Caller requesting information about Combined Birth Control Pills   Negative: Caller requesting information about Progestin Birth Control Pills   Negative: Caller requesting information about post-op pain or medicines   Negative: Caller requesting a prescription antibiotic (such as Penicillin) for Strep throat and has a positive culture result   Negative: Caller requesting a prescription anti-viral med (such as Tamiflu) and has influenza (flu) symptoms   Negative: Immunization reaction suspected   Negative: Rash while taking a medicine or within 3 days of stopping it   Negative: [1] Asthma and [2] having symptoms of asthma (cough, wheezing, etc.)   Negative: [1] Symptom of illness (e.g., headache, abdominal pain, earache, vomiting) AND [2] more than mild   Negative: Breastfeeding questions about mother's medicines and diet   Negative: MORE THAN A DOUBLE DOSE of a prescription or over-the-counter (OTC) drug   Negative: [1] DOUBLE DOSE (an extra dose or lesser amount) of prescription drug AND [2] any symptoms (e.g., dizziness, nausea, pain, sleepiness)   Negative: [1] DOUBLE DOSE (an extra dose or lesser amount) of over-the-counter (OTC) drug AND [2] any symptoms (e.g., dizziness, nausea, pain, sleepiness)   Negative: Took another person's prescription drug   Negative: [1] DOUBLE DOSE (an extra dose or lesser amount) of prescription drug AND [2] NO symptoms  (Exception: A double dose of antibiotics.)   Negative: Diabetes drug error or overdose (e.g., took wrong type of insulin or took extra dose)   Negative: [1] Prescription not at pharmacy AND [2] was prescribed by PCP recently (Exception: Triager has access to EMR and prescription is recorded there. Go to Home Care and confirm for pharmacy.)   Negative: [1] Pharmacy calling with prescription question AND [2] triager unable to answer question   Negative: [1] Caller has URGENT medicine question about med that PCP or specialist  prescribed AND [2] triager unable to answer question   Negative: Medicine patch causing local rash or itching   Negative: [1] Caller has medicine question about med NOT prescribed by PCP AND [2] triager unable to answer question (e.g., compatibility with other med, storage)    Protocols used: Medication Question Call-A-AH

## 2025-02-20 ENCOUNTER — PATIENT MESSAGE (OUTPATIENT)
Dept: NEUROSURGERY | Facility: CLINIC | Age: 40
End: 2025-02-20
Payer: COMMERCIAL

## 2025-02-20 ENCOUNTER — HOSPITAL ENCOUNTER (OUTPATIENT)
Dept: RADIOLOGY | Facility: OTHER | Age: 40
Discharge: HOME | End: 2025-02-20
Attending: NURSE PRACTITIONER
Payer: COMMERCIAL

## 2025-02-20 ENCOUNTER — TELEPHONE (OUTPATIENT)
Dept: NEUROSURGERY | Facility: CLINIC | Age: 40
End: 2025-02-20
Payer: COMMERCIAL

## 2025-02-20 ENCOUNTER — OFFICE VISIT (OUTPATIENT)
Dept: SPINE | Facility: CLINIC | Age: 40
End: 2025-02-20
Payer: COMMERCIAL

## 2025-02-20 VITALS
SYSTOLIC BLOOD PRESSURE: 115 MMHG | DIASTOLIC BLOOD PRESSURE: 76 MMHG | OXYGEN SATURATION: 100 % | HEIGHT: 65 IN | BODY MASS INDEX: 24.5 KG/M2 | RESPIRATION RATE: 19 BRPM | HEART RATE: 85 BPM | WEIGHT: 147.06 LBS

## 2025-02-20 DIAGNOSIS — M47.816 LUMBAR SPONDYLOSIS: ICD-10-CM

## 2025-02-20 DIAGNOSIS — Q76.49 BERTOLOTTI SYNDROME: ICD-10-CM

## 2025-02-20 DIAGNOSIS — M54.9 DORSALGIA, UNSPECIFIED: ICD-10-CM

## 2025-02-20 DIAGNOSIS — M51.360 DEGENERATION OF INTERVERTEBRAL DISC OF LUMBAR REGION WITH DISCOGENIC BACK PAIN: Primary | ICD-10-CM

## 2025-02-20 PROCEDURE — 72148 MRI LUMBAR SPINE W/O DYE: CPT | Mod: TC

## 2025-02-20 PROCEDURE — 72082 X-RAY EXAM ENTIRE SPI 2/3 VW: CPT | Mod: TC,FY

## 2025-02-20 PROCEDURE — 72114 X-RAY EXAM L-S SPINE BENDING: CPT | Mod: TC,FY

## 2025-02-20 NOTE — TELEPHONE ENCOUNTER
Pt was advised to continue with the plan of care placed by pain management . Pt verbalized understanding.

## 2025-02-20 NOTE — H&P (VIEW-ONLY)
PCP: Glenn Vega MD    REFERRING PHYSICIAN: Lisa Thibodeaux NP    CHIEF COMPLAINT: Left lower back, sacroiliac region    Original HISTORY OF PRESENT ILLNESS: Flora Edmond presents to the clinic for the evaluation of the above pain. The pain started over the years ans was aggravated when she fell laying soccer years ago. 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 Pseudoarthrosis of the right aspect of L5 with the sacrum. The patient has a hx of thoracic outlet syndrome. Pt saw Dr Thibodeaux for this finding, She reviewed imaging and concerns with Dr. Cordoba, he recommended injections for the Bertolotti syndrom.    Original Pain Description:  The pain is located in the Sacroiliac region L > R and is axial in nature. The pain is described as aching and sometimes catching preventing her from moving. Exacerbating factors: Sitting, Standing, Lifting, and Getting out of bed/chair. Mitigating factors medication, laying down and massage. Symptoms interfere with daily activity. The patient feels like symptoms have been unchanged. Patient denies night fever/night sweats, urinary incontinence, bowel incontinence, significant weight loss, significant motor weakness, and loss of sensations.    Original PAIN SCORES:  Best: Pain is 3  Worst: Pain is 10  Current: Pain is 6         No data to display                INTERVAL HISTORY: (Newest visit at the bottom)   Interval History (Date):       6 weeks of Conservative therapy:  PT: October-Dec, 2024  Chiro: no  HEP: no      Treatments / Medications: (Ice/Heat/NSAIDS/APAP/etc):  Ibuprofen   Flexaril       Interventional Pain Procedures: (Previous injections)  No      Past Medical History:   Diagnosis Date    Allergy     seasonal    BRCA1 negative August 2023    BRCA2 negative August 2023    Cervical rib     Colon cancer July 1st    Vitamin D deficiency      Past Surgical History:   Procedure Laterality Date     COLONOSCOPY N/A 6/30/2023    Procedure: COLONOSCOPY;  Surgeon: Andriy Vargas MD;  Location: [facility];  Service: Endoscopy;  Laterality: N/A;  Sutab  Prep instructions given to pt in clinic -    COLONOSCOPY N/A 10/12/2023    Procedure: COLONOSCOPY;  Surgeon: Alda Partida MD;  [facility]  Service: Endoscopy;  Laterality: N/A;  8/21- referred to Dr. Partida/ Prep instructions to portal. AS.pt request sooner appt with the first available with any provider. r/s 10.13.23 with  at [facility]  8/30-pt to be done with Dr. Partida-MS  10/5-precall complete-MS    COLONOSCOPY, SCREENING, HIGH RISK PATIENT N/A 10/25/2024    Procedure: COLONOSCOPY, SCREENING, HIGH RISK PATIENT;  Surgeon: Alda Partida MD;  Location: [address]);  Service: Endoscopy;  Laterality: N/A;  referral carly/ prep inst sent to pt via portal / sutabs pt request/  10/17-procedure prep instructions sent via portal-mkp  10/18-pre call complete-confirmed new arrival time 645am-new instr via portal-tb    WISDOM TOOTH EXTRACTION  2007     Social History[1]  Family History   Problem Relation Name Age of Onset    Breast cancer Maternal Grandmother Mio 61    Hemophilia Father Micheal Mayito         Factor IX    Prostate cancer Father Micheal Mayito 72        Emi 6 or 7    Hyperlipidemia Father Micheal Mayito     Coronary artery disease Father Micheal Mayito     Heart disease Father Micheal Mayito     Thyroid disease Mother Lilo Guillen     Breast cancer Mother Lilo Guillen 72        triple negative, CHEK2 VUS    Asthma Mother Llio Guillen     No Known Problems Sister      Prostate cancer Paternal Uncle Sam Edmond 55    Prostate cancer Paternal Grandfather Ravindra Collinser 70    COPD Paternal Grandfather Waite Mayito     Heart disease Paternal Grandfather Ravindra Mayito     Hemophilia Other      Breast cancer Other          dx @ 60s    Colon cancer Neg Hx      Ovarian cancer Neg Hx         Review of patient's allergies  indicates:   Allergen Reactions    Cat/feline products Itching       Current Outpatient Medications   Medication Sig    APRETUDE 600 mg/3 mL (200 mg/mL) SpSR IM injection SMARTSI Syringe(s) IM    atovaquone-proguaniL (MALARONE) 250-100 mg Tab Take one tablet daily for malaria prevention. Begin one day before entering malarious area and continue for 1 week after return.    levonorgestreL (MIRENA) 21 mcg/24 hours (8 yrs) 52 mg IUD by Intrauterine route.    oseltamivir (TAMIFLU) 75 MG capsule Take 1 capsule (75 mg total) by mouth 2 (two) times daily. for 5 days    perfluorohexyloctane, PF, 100 % Drop Place 1 drop into both eyes 4 (four) times daily.    pimecrolimus (ELIDEL) 1 % cream Apply to affected areas of face TWICE DAILY as needed for irritation.    scopolamine (TRANSDERM-SCOP) 1.3-1.5 mg (1 mg over 3 days) Place 1 patch onto the skin every 72 hours.    valACYclovir (VALTREX) 1000 MG tablet Take 1 tablet (1,000 mg total) by mouth once daily.    LORazepam (ATIVAN) 0.5 MG tablet Take 1 tablet (0.5 mg total) by mouth once as needed for Anxiety. Take 1 tab 20-30 minutes prior to MRI; may repeat if needed     Current Facility-Administered Medications   Medication    levonorgestreL (Mirena) 52 mg IUD 1 Intra Uterine Device       ROS:  GENERAL: No fever. No chills. No fatigue. Denies weight loss. Denies weight gain.  HEENT: Denies headaches. Denies vision change. Denies eye pain. Denies double vision. Denies ear pain.   CV: Denies chest pain.   PULM: Denies of shortness of breath.  GI: Denies constipation. No diarrhea. No abdominal pain. Denies nausea. Denies vomiting. No blood in stool.  HEME: Denies bleeding problems.  : Denies urgency. No painful urination. No blood in urine.  MS: Denies joint stiffness. Denies joint swelling.  + back pain.  SKIN: Denies rash.   NEURO: Denies seizures. No weakness.  PSYCH:  Denies difficulty sleeping. No anxiety. Denies depression. No suicidal thoughts.       VITALS:   Vitals:  "   02/20/25 1326   BP: 115/76   Pulse: 85   Resp: 19   SpO2: 100%   Weight: 66.7 kg (147 lb 0.8 oz)   Height: 5' 5" (1.651 m)   PainSc:   6   PainLoc: Back         PHYSICAL EXAM:   GENERAL: Well appearing, in no acute distress, alert and oriented x3.  PSYCH:  Mood and affect appropriate.  SKIN: Skin color, texture, turgor normal, no rashes or lesions.  HEENT:  Normocephalic, atraumatic. Cranial nerves grossly intact.  NECK: No pain to palpation over the cervical paraspinous muscles. No pain to palpation over facets. No pain with neck flexion, extension, or lateral flexion.   PULM: No evidence of respiratory difficulty, symmetric chest rise.  GI:  Non-distended  BACK: Normal range of motion. Pain to palpation over the L sacroiliac joint. No pain to palpation over facet joints. There is no pain with palpation over the sacroiliac joints bilaterally. +Milgrams.   EXTREMITIES: No deformities, edema, or skin discoloration.   MUSCULOSKELETAL: Shoulder, hip, and knee provocative maneuvers are negative. No atrophy is noted.  NEURO: Sensation is equal and appropriate bilaterally. Bilateral upper and lower extremity strength is normal and symmetric. Bilateral upper and lower extremity coordination and muscle stretch reflexes are physiologic and symmetric. Plantar response are downgoing. Straight leg raising in the supine position is negative to radicular pain.   GAIT: normal.      LABS:      IMAGING:    MRI LUMBAR SPINE WITHOUT CONTRAST     CLINICAL HISTORY:  Low back pain, symptoms persist with > 6wks conservative treatment; Spondylosis without myelopathy or radiculopathy, lumbar region     TECHNIQUE:  Multiplanar, multisequence MR images were acquired from the thoracolumbar junction to the sacrum without the administration of contrast.     COMPARISON:  Radiograph 02/20/2025 and CT examination 01/27/2025     FINDINGS:  Alignment: Normal.     Vertebrae: 6 lumbar-  vertebral bodies as confirmed on CT examination of 01/27/2025 " with count down from 1st rib-bearing vertebral body on CT chest abdomen pelvis.  This notes a lumbarized S1 vertebral body segment..     Discs: Desiccation of disc material with annular fissure L5-S1.     Cord: Normal. Conus terminates at L1-L2.     Degenerative findings:     T12-L1: There is no focal disc herniation. No significant central canal narrowing . No significant neural foraminal narrowing.     L1-L2: There is no focal disc herniation. No significant central canal narrowing . No significant neural foraminal narrowing.     L2-L3: There is no focal disc herniation. No significant central canal narrowing . No significant neural foraminal narrowing.     L3-L4: There is no focal disc herniation. No significant central canal narrowing . No significant neural foraminal narrowing.     L4-L5: There is no focal disc herniation. No significant central canal narrowing . No significant neural foraminal narrowing.     L5-S1: There is no focal disc herniation.Mild facet hypertrophy.  No significant central canal narrowing . No significant neural foraminal narrowing.     Paraspinal muscles & soft tissues: Unremarkable.     Impression:     Type 1 Modic changes L5-S1 with desiccation of disc material and annular fissure.  No focal protrusion or extrusion of disc material or significant central/foraminal stenosis.     Lumbarized S1 segment based on count down from 1st rib-bearing vertebral body CT chest abdomen pelvis 01/27/2025     Please note there is a transition type lumbosacral segment with nomenclature as above.  Correlation with imaging suggested prior to any intervention.    XR SCOLIOSIS COMPLETE 2/6/25     CLINICAL HISTORY:  Other congenital malformations of spine, not associated with scoliosis     TECHNIQUE:  Frontal and lateral radiographs of the spine     COMPARISON:  None     FINDINGS:  There may be slight rightward curvature of the spine.  Bilateral cervical ribs at C7.  Pseudoarticulation between L5 and the  sacrum on the right.     Vertebral body heights are maintained.     Clear lungs.  Nonobstructive bowel gas pattern.  Intrauterine device projects over the pelvis.     Impression:     Please see above.       XR LUMBAR SPINE 5 VIEW WITH FLEX AND EXT 2/6/25     CLINICAL HISTORY:  Spondylosis without myelopathy or radiculopathy, lumbar region     TECHNIQUE:  AP, lateral, and oblique images are performed through the lumbar spine.     COMPARISON:  None     FINDINGS:  Lumbar vertebral body heights are maintained.     Mild disc space narrowing L4-5 and L5-S1.  Pseudoarticulation between L5 and the sacrum on the right.     AP alignment is anatomic.     Intrauterine device projects over the pelvis.     Impression:     No acute osseous abnormality seen.      ASSESSMENT: 39 y.o. year old female with pain, consistent with:    Encounter Diagnoses   Name Primary?    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 She comes to us with Left low back pain which is worst with sit to stand, and bending.  MRI shows significant DDD at L5/S1 with an annular tear and pseudoarthrosis of the right aspect of L5 with the sacrum. Exam shows positive Milgram's test. I suspect this is a combination of her worsening DDD and Bertolotti's.     PLAN:  Reviewed imaging and discussed with patient   Continue HEP  Encouraged heat therapy   Discussed DDD and to avoid triggers: repetitive bending and lifting  Continue Ibuprofen for exacerbations  Schedule b/l L5/S1 Bertolotti's joint injection  Follow up to discuss outcomes      I would like to thank Lisa Thiboedaux, NP for the opportunity to assist in the care of this patient. We had a very nice visit and I look forward to continuing their care. Please let me know if I can be of further assistance.     Samia Major  02/20/2025          [1]    Social History  Socioeconomic History    Marital status: Single   Tobacco Use    Smoking status: Former     Types: Vaping with nicotine    Smokeless tobacco: Never    Tobacco comments:     Stop smoking 2019   Substance and Sexual Activity    Alcohol use: Yes     Alcohol/week: 5.0 standard drinks of alcohol     Types: 5 Glasses of wine per week     Comment: social    Drug use: No    Sexual activity: Yes     Partners: Male     Birth control/protection: Condom, I.U.D.   Other Topics Concern    Are you pregnant or think you may be? No    Breast-feeding No   Social History Narrative    Graduated with masters in public health in May 2015    Currently seeking employment    Working at [employer]  in [employer]    Originally from [place]    Moved to [facility] in 2013, lives alone         Social Drivers of Health     Financial Resource Strain: Medium Risk (2/14/2025)    Overall Financial Resource Strain (CARDIA)     Difficulty of Paying Living Expenses: Somewhat hard   Food Insecurity: No Food Insecurity (2/14/2025)    Hunger Vital Sign     Worried About Running Out of Food in the Last Year: Never true     Ran Out of Food in the Last Year: Never true   Transportation Needs: No Transportation Needs (2/14/2025)    PRAPARE - Transportation     Lack of Transportation (Medical): No     Lack of Transportation (Non-Medical): No   Physical Activity: Inactive (2/14/2025)    Exercise Vital Sign     Days of Exercise per Week: 0 days     Minutes of Exercise per Session: 0 min   Stress: Stress Concern Present (2/14/2025)    [employer] - Occupational Stress Questionnaire     Feeling of Stress : To some extent   Housing Stability: Low Risk  (2/14/2025)    Housing Stability Vital Sign     Unable to Pay for Housing in the Last Year: No     Number of Times Moved in the Last Year: 0     Homeless in the Last Year: No

## 2025-02-20 NOTE — PROGRESS NOTES
PCP: Glenn Vega MD    REFERRING PHYSICIAN: Lisa Thibodeaux NP    CHIEF COMPLAINT: Left lower back, sacroiliac region    Original HISTORY OF PRESENT ILLNESS: Flora Edmond presents to the clinic for the evaluation of the above pain. The pain started over the years ans was aggravated when she fell laying soccer years ago. 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 Pseudoarthrosis of the right aspect of L5 with the sacrum. The patient has a hx of thoracic outlet syndrome. Pt saw Dr Thibodeaux for this finding, She reviewed imaging and concerns with Dr. Cordoba, he recommended injections for the Bertolotti syndrom.    Original Pain Description:  The pain is located in the Sacroiliac region L > R and is axial in nature. The pain is described as aching and sometimes catching preventing her from moving. Exacerbating factors: Sitting, Standing, Lifting, and Getting out of bed/chair. Mitigating factors medication, laying down and massage. Symptoms interfere with daily activity. The patient feels like symptoms have been unchanged. Patient denies night fever/night sweats, urinary incontinence, bowel incontinence, significant weight loss, significant motor weakness, and loss of sensations.    Original PAIN SCORES:  Best: Pain is 3  Worst: Pain is 10  Current: Pain is 6         No data to display                INTERVAL HISTORY: (Newest visit at the bottom)   Interval History (Date):       6 weeks of Conservative therapy:  PT: October-Dec, 2024  Chiro: no  HEP: no      Treatments / Medications: (Ice/Heat/NSAIDS/APAP/etc):  Ibuprofen   Flexaril       Interventional Pain Procedures: (Previous injections)  No      Past Medical History:   Diagnosis Date    Allergy     seasonal    BRCA1 negative August 2023    BRCA2 negative August 2023    Cervical rib     Colon cancer July 1st    Vitamin D deficiency      Past Surgical History:   Procedure Laterality Date     COLONOSCOPY N/A 6/30/2023    Procedure: COLONOSCOPY;  Surgeon: Andriy Vargas MD;  Location: [facility] ENDOSCOPY;  Service: Endoscopy;  Laterality: N/A;  Sutab  Prep instructions given to pt in clinic -    COLONOSCOPY N/A 10/12/2023    Procedure: COLONOSCOPY;  Surgeon: Alda Partida MD;  [facility]  Service: Endoscopy;  Laterality: N/A;  8/21- referred to Dr. Partida/ Prep instructions to portal. AS.pt request sooner appt with the first available with any provider. r/s 10.13.23 with  at [place]  8/30-pt to be done with Dr. Partida-MS  10/5-precall complete-MS    COLONOSCOPY, SCREENING, HIGH RISK PATIENT N/A 10/25/2024    Procedure: COLONOSCOPY, SCREENING, HIGH RISK PATIENT;  Surgeon: Alda Partida MD;  Location: [address]);  Service: Endoscopy;  Laterality: N/A;  referral carly/ prep inst sent to pt via portal / sutabs pt request/  10/17-procedure prep instructions sent via portal-mkp  10/18-pre call complete-confirmed new arrival time 645am-new instr via portal-tb    WISDOM TOOTH EXTRACTION  2007     Social History[1]  Family History   Problem Relation Name Age of Onset    Breast cancer Maternal Grandmother Mio 61    Hemophilia Father Micheal Mayito         Factor IX    Prostate cancer Father Micheal Mayito 72        Emi 6 or 7    Hyperlipidemia Father Micheal Mayito     Coronary artery disease Father Micheal Mayito     Heart disease Father Micheal Mayito     Thyroid disease Mother Lilo Guillen     Breast cancer Mother Lilo Guillen 72        triple negative, CHEK2 VUS    Asthma Mother Lilo Guillen     No Known Problems Sister      Prostate cancer Paternal Uncle Sam Edmond 55    Prostate cancer Paternal Grandfather Ravindra Collinser 70    COPD Paternal Grandfather Waite Mayito     Heart disease Paternal Grandfather Ravindra Mayito     Hemophilia Other      Breast cancer Other          dx @ 60s    Colon cancer Neg Hx      Ovarian cancer Neg Hx         Review of patient's allergies  indicates:   Allergen Reactions    Cat/feline products Itching       Current Outpatient Medications   Medication Sig    APRETUDE 600 mg/3 mL (200 mg/mL) SpSR IM injection SMARTSI Syringe(s) IM    atovaquone-proguaniL (MALARONE) 250-100 mg Tab Take one tablet daily for malaria prevention. Begin one day before entering malarious area and continue for 1 week after return.    levonorgestreL (MIRENA) 21 mcg/24 hours (8 yrs) 52 mg IUD by Intrauterine route.    oseltamivir (TAMIFLU) 75 MG capsule Take 1 capsule (75 mg total) by mouth 2 (two) times daily. for 5 days    perfluorohexyloctane, PF, 100 % Drop Place 1 drop into both eyes 4 (four) times daily.    pimecrolimus (ELIDEL) 1 % cream Apply to affected areas of face TWICE DAILY as needed for irritation.    scopolamine (TRANSDERM-SCOP) 1.3-1.5 mg (1 mg over 3 days) Place 1 patch onto the skin every 72 hours.    valACYclovir (VALTREX) 1000 MG tablet Take 1 tablet (1,000 mg total) by mouth once daily.    LORazepam (ATIVAN) 0.5 MG tablet Take 1 tablet (0.5 mg total) by mouth once as needed for Anxiety. Take 1 tab 20-30 minutes prior to MRI; may repeat if needed     Current Facility-Administered Medications   Medication    levonorgestreL (Mirena) 52 mg IUD 1 Intra Uterine Device       ROS:  GENERAL: No fever. No chills. No fatigue. Denies weight loss. Denies weight gain.  HEENT: Denies headaches. Denies vision change. Denies eye pain. Denies double vision. Denies ear pain.   CV: Denies chest pain.   PULM: Denies of shortness of breath.  GI: Denies constipation. No diarrhea. No abdominal pain. Denies nausea. Denies vomiting. No blood in stool.  HEME: Denies bleeding problems.  : Denies urgency. No painful urination. No blood in urine.  MS: Denies joint stiffness. Denies joint swelling.  + back pain.  SKIN: Denies rash.   NEURO: Denies seizures. No weakness.  PSYCH:  Denies difficulty sleeping. No anxiety. Denies depression. No suicidal thoughts.       VITALS:   Vitals:  "   02/20/25 1326   BP: 115/76   Pulse: 85   Resp: 19   SpO2: 100%   Weight: 66.7 kg (147 lb 0.8 oz)   Height: 5' 5" (1.651 m)   PainSc:   6   PainLoc: Back         PHYSICAL EXAM:   GENERAL: Well appearing, in no acute distress, alert and oriented x3.  PSYCH:  Mood and affect appropriate.  SKIN: Skin color, texture, turgor normal, no rashes or lesions.  HEENT:  Normocephalic, atraumatic. Cranial nerves grossly intact.  NECK: No pain to palpation over the cervical paraspinous muscles. No pain to palpation over facets. No pain with neck flexion, extension, or lateral flexion.   PULM: No evidence of respiratory difficulty, symmetric chest rise.  GI:  Non-distended  BACK: Normal range of motion. Pain to palpation over the L sacroiliac joint. No pain to palpation over facet joints. There is no pain with palpation over the sacroiliac joints bilaterally. +Milgrams.   EXTREMITIES: No deformities, edema, or skin discoloration.   MUSCULOSKELETAL: Shoulder, hip, and knee provocative maneuvers are negative. No atrophy is noted.  NEURO: Sensation is equal and appropriate bilaterally. Bilateral upper and lower extremity strength is normal and symmetric. Bilateral upper and lower extremity coordination and muscle stretch reflexes are physiologic and symmetric. Plantar response are downgoing. Straight leg raising in the supine position is negative to radicular pain.   GAIT: normal.      LABS:      IMAGING:    MRI LUMBAR SPINE WITHOUT CONTRAST     CLINICAL HISTORY:  Low back pain, symptoms persist with > 6wks conservative treatment; Spondylosis without myelopathy or radiculopathy, lumbar region     TECHNIQUE:  Multiplanar, multisequence MR images were acquired from the thoracolumbar junction to the sacrum without the administration of contrast.     COMPARISON:  Radiograph 02/20/2025 and CT examination 01/27/2025     FINDINGS:  Alignment: Normal.     Vertebrae: 6 lumbar-  vertebral bodies as confirmed on CT examination of 01/27/2025 " with count down from 1st rib-bearing vertebral body on CT chest abdomen pelvis.  This notes a lumbarized S1 vertebral body segment..     Discs: Desiccation of disc material with annular fissure L5-S1.     Cord: Normal. Conus terminates at L1-L2.     Degenerative findings:     T12-L1: There is no focal disc herniation. No significant central canal narrowing . No significant neural foraminal narrowing.     L1-L2: There is no focal disc herniation. No significant central canal narrowing . No significant neural foraminal narrowing.     L2-L3: There is no focal disc herniation. No significant central canal narrowing . No significant neural foraminal narrowing.     L3-L4: There is no focal disc herniation. No significant central canal narrowing . No significant neural foraminal narrowing.     L4-L5: There is no focal disc herniation. No significant central canal narrowing . No significant neural foraminal narrowing.     L5-S1: There is no focal disc herniation.Mild facet hypertrophy.  No significant central canal narrowing . No significant neural foraminal narrowing.     Paraspinal muscles & soft tissues: Unremarkable.     Impression:     Type 1 Modic changes L5-S1 with desiccation of disc material and annular fissure.  No focal protrusion or extrusion of disc material or significant central/foraminal stenosis.     Lumbarized S1 segment based on count down from 1st rib-bearing vertebral body CT chest abdomen pelvis 01/27/2025     Please note there is a transition type lumbosacral segment with nomenclature as above.  Correlation with imaging suggested prior to any intervention.    XR SCOLIOSIS COMPLETE 2/6/25     CLINICAL HISTORY:  Other congenital malformations of spine, not associated with scoliosis     TECHNIQUE:  Frontal and lateral radiographs of the spine     COMPARISON:  None     FINDINGS:  There may be slight rightward curvature of the spine.  Bilateral cervical ribs at C7.  Pseudoarticulation between L5 and the  sacrum on the right.     Vertebral body heights are maintained.     Clear lungs.  Nonobstructive bowel gas pattern.  Intrauterine device projects over the pelvis.     Impression:     Please see above.       XR LUMBAR SPINE 5 VIEW WITH FLEX AND EXT 2/6/25     CLINICAL HISTORY:  Spondylosis without myelopathy or radiculopathy, lumbar region     TECHNIQUE:  AP, lateral, and oblique images are performed through the lumbar spine.     COMPARISON:  None     FINDINGS:  Lumbar vertebral body heights are maintained.     Mild disc space narrowing L4-5 and L5-S1.  Pseudoarticulation between L5 and the sacrum on the right.     AP alignment is anatomic.     Intrauterine device projects over the pelvis.     Impression:     No acute osseous abnormality seen.      ASSESSMENT: 39 y.o. year old female with pain, consistent with:    Encounter Diagnoses   Name Primary?    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 She comes to us with Left low back pain which is worst with sit to stand, and bending.  MRI shows significant DDD at L5/S1 with an annular tear and pseudoarthrosis of the right aspect of L5 with the sacrum. Exam shows positive Milgram's test. I suspect this is a combination of her worsening DDD and Bertolotti's.     PLAN:  Reviewed imaging and discussed with patient   Continue HEP  Encouraged heat therapy   Discussed DDD and to avoid triggers: repetitive bending and lifting  Continue Ibuprofen for exacerbations  Schedule b/l L5/S1 Bertolotti's joint injection  Follow up to discuss outcomes      I would like to thank Lisa Thibodeaux, NP for the opportunity to assist in the care of this patient. We had a very nice visit and I look forward to continuing their care. Please let me know if I can be of further assistance.     Samia Major  02/20/2025          [1]    Social History  Socioeconomic History    Marital status: Single   Tobacco Use    Smoking status: Former     Types: Vaping with nicotine    Smokeless tobacco: Never    Tobacco comments:     Stop smoking 2019   Substance and Sexual Activity    Alcohol use: Yes     Alcohol/week: 5.0 standard drinks of alcohol     Types: 5 Glasses of wine per week     Comment: social    Drug use: No    Sexual activity: Yes     Partners: Male     Birth control/protection: Condom, I.U.D.   Other Topics Concern    Are you pregnant or think you may be? No    Breast-feeding No   Social History Narrative    Graduated with masters in public health in May 2015    Currently seeking employment    Working at [employer] as  in [employer]    Originally from [place]    Moved to [facility] in 2013, lives alone         Social Drivers of Health     Financial Resource Strain: Medium Risk (2/14/2025)    Overall Financial Resource Strain (CARDIA)     Difficulty of Paying Living Expenses: Somewhat hard   Food Insecurity: No Food Insecurity (2/14/2025)    Hunger Vital Sign     Worried About Running Out of Food in the Last Year: Never true     Ran Out of Food in the Last Year: Never true   Transportation Needs: No Transportation Needs (2/14/2025)    PRAPARE - Transportation     Lack of Transportation (Medical): No     Lack of Transportation (Non-Medical): No   Physical Activity: Inactive (2/14/2025)    Exercise Vital Sign     Days of Exercise per Week: 0 days     Minutes of Exercise per Session: 0 min   Stress: Stress Concern Present (2/14/2025)    [employer] - Occupational Stress Questionnaire     Feeling of Stress : To some extent   Housing Stability: Low Risk  (2/14/2025)    Housing Stability Vital Sign     Unable to Pay for Housing in the Last Year: No     Number of Times Moved in the Last Year: 0     Homeless in the Last Year: No

## 2025-02-21 ENCOUNTER — PATIENT MESSAGE (OUTPATIENT)
Dept: PAIN MEDICINE | Facility: OTHER | Age: 40
End: 2025-02-21
Payer: COMMERCIAL

## 2025-02-21 DIAGNOSIS — Q76.49 BERTOLOTTI SYNDROME: Primary | ICD-10-CM

## 2025-03-07 ENCOUNTER — HOSPITAL ENCOUNTER (OUTPATIENT)
Facility: OTHER | Age: 40
Discharge: HOME | End: 2025-03-07
Attending: ANESTHESIOLOGY | Admitting: ANESTHESIOLOGY
Payer: COMMERCIAL

## 2025-03-07 ENCOUNTER — TELEPHONE (OUTPATIENT)
Dept: PAIN MEDICINE | Facility: CLINIC | Age: 40
End: 2025-03-07
Payer: COMMERCIAL

## 2025-03-07 VITALS
BODY MASS INDEX: 24.16 KG/M2 | WEIGHT: 145 LBS | OXYGEN SATURATION: 98 % | TEMPERATURE: 98 F | HEIGHT: 65 IN | DIASTOLIC BLOOD PRESSURE: 72 MMHG | SYSTOLIC BLOOD PRESSURE: 108 MMHG | HEART RATE: 64 BPM | RESPIRATION RATE: 18 BRPM

## 2025-03-07 DIAGNOSIS — G89.29 CHRONIC PAIN: ICD-10-CM

## 2025-03-07 DIAGNOSIS — G89.29 CHRONIC BILATERAL LOW BACK PAIN WITHOUT SCIATICA: Primary | ICD-10-CM

## 2025-03-07 DIAGNOSIS — M54.50 CHRONIC BILATERAL LOW BACK PAIN WITHOUT SCIATICA: Primary | ICD-10-CM

## 2025-03-07 PROCEDURE — 64493 INJ PARAVERT F JNT L/S 1 LEV: CPT | Mod: 50 | Performed by: ANESTHESIOLOGY

## 2025-03-07 PROCEDURE — 99152 MOD SED SAME PHYS/QHP 5/>YRS: CPT | Mod: ,,, | Performed by: ANESTHESIOLOGY

## 2025-03-07 PROCEDURE — 99152 MOD SED SAME PHYS/QHP 5/>YRS: CPT | Performed by: ANESTHESIOLOGY

## 2025-03-07 PROCEDURE — 63600175 PHARM REV CODE 636 W HCPCS: Performed by: ANESTHESIOLOGY

## 2025-03-07 PROCEDURE — 64493 INJ PARAVERT F JNT L/S 1 LEV: CPT | Mod: 50,,, | Performed by: ANESTHESIOLOGY

## 2025-03-07 PROCEDURE — 25500020 PHARM REV CODE 255: Performed by: ANESTHESIOLOGY

## 2025-03-07 RX ORDER — FENTANYL CITRATE 50 UG/ML
INJECTION, SOLUTION INTRAMUSCULAR; INTRAVENOUS
Status: DISCONTINUED | OUTPATIENT
Start: 2025-03-07 | End: 2025-03-07 | Stop reason: HOSPADM

## 2025-03-07 RX ORDER — SODIUM CHLORIDE 9 MG/ML
INJECTION, SOLUTION INTRAVENOUS CONTINUOUS
Status: DISCONTINUED | OUTPATIENT
Start: 2025-03-07 | End: 2025-03-07 | Stop reason: HOSPADM

## 2025-03-07 RX ORDER — MIDAZOLAM HYDROCHLORIDE 1 MG/ML
INJECTION INTRAMUSCULAR; INTRAVENOUS
Status: DISCONTINUED | OUTPATIENT
Start: 2025-03-07 | End: 2025-03-07 | Stop reason: HOSPADM

## 2025-03-07 RX ORDER — TRIAMCINOLONE ACETONIDE 40 MG/ML
INJECTION, SUSPENSION INTRA-ARTICULAR; INTRAMUSCULAR
Status: DISCONTINUED | OUTPATIENT
Start: 2025-03-07 | End: 2025-03-07 | Stop reason: HOSPADM

## 2025-03-07 RX ORDER — BUPIVACAINE HYDROCHLORIDE 2.5 MG/ML
INJECTION, SOLUTION EPIDURAL; INFILTRATION; INTRACAUDAL; PERINEURAL
Status: DISCONTINUED | OUTPATIENT
Start: 2025-03-07 | End: 2025-03-07 | Stop reason: HOSPADM

## 2025-03-07 RX ORDER — LIDOCAINE HYDROCHLORIDE 20 MG/ML
INJECTION, SOLUTION INFILTRATION; PERINEURAL
Status: DISCONTINUED | OUTPATIENT
Start: 2025-03-07 | End: 2025-03-07 | Stop reason: HOSPADM

## 2025-03-07 NOTE — DISCHARGE INSTRUCTIONS

## 2025-03-07 NOTE — OP NOTE
Therapeutic BILATERAL Bertolotti Joint Injection under Fluoroscopy     The procedure, risks, benefits, and options were discussed with the patient. There are no contraindications to the procedure. The patent expressed understanding and agreed to the procedure. Informed written consent was obtained prior to the start of the procedure and can be found in the patient's chart.    PATIENT NAME: Flora Edmond   MRN: [id number]     DATE OF PROCEDURE: 03/07/2025    PROCEDURE: Bilateral  Bertolotti Joint Injection under Fluoroscopy      PRE-OP DIAGNOSIS: Bertolotti syndrome [Q76.49] Lumbar spondylosis [M47.816]    POST-OP DIAGNOSIS: Same    PHYSICIAN: Lilo Hernandez MD    ASSISTANTS:  Jose Raul CHANDLER    MEDICATIONS INJECTED: 1cc Preservative-free kenalog 40mg/mL with 1.5cc of Bupivacaine 0.25%    LOCAL ANESTHETIC INJECTED: Xylocaine 2%     SEDATION: Versed 1mg and Fentanyl 50mcg                                                                                                                                                                                     Conscious sedation ordered by M.D. Patient re-evaluation prior to administration of conscious sedation. No changes noted in patient's status from initial evaluation. The patient's vital signs were monitored by RN and patient remained hemodynamically stable throughout the procedure.    Event Time In   Sedation Start 1159   Sedation End 1213       ESTIMATED BLOOD LOSS: None    COMPLICATIONS: None    TECHNIQUE: Time-out was performed to identify the patient and procedure to be performed. With the patient laying in a prone position, the surgical area was prepped and draped in the usual sterile fashion using ChloraPrep and a fenestrated drape. The level was determined under fluoroscopy guidance. Skin anesthesia was achieved by injecting Lidocaine 2% over the injection sites. A 22 gauge, 5 inch needle was introduced to each level using AP, lateral and/or contralateral oblique  fluoroscopic imaging. Contrast dye  Omnipaque (300mg/mL) was given until dye spread was in the distribution of the R Bertolotti joint without any intravascular spread.  After negative aspiration for blood or CSF was confirmed, 1.25mL of the medication mixture listed above was injected slowly into each joint with displacement of the contrast confirming that the medication went into the distribution of the Right Bertolotti joints. The procedure was then repeated on the left side. The needles were removed and bleeding was nil. A sterile dressing was applied. No specimens collected. The patient tolerated the procedure well.    ADDITIONAL CONSIDERATIONS:  We clearly saw a joint on the R side, identified with contrast, and we injected the medication into that space. On the left side we advanced the needle to the L5, S1 bertolotti joint space but the joint was less identifiable on fluoroscopy. We injected contrast and got a good joint spread, so we injected the medication there as well to assist with pain control.     The patient was monitored after the procedure in the recovery area. They were given post-procedure and discharge instructions to follow at home. The patient was discharged in a stable condition.    Deandre Carroll MD     I reviewed and edited the fellow's note. I conducted my own interview and physical examination. I agree with the findings. I was present and supervising all critical portions of the procedure.

## 2025-03-07 NOTE — TELEPHONE ENCOUNTER
Staff called pt about message that was left with the call center. [facility].

## 2025-03-07 NOTE — DISCHARGE SUMMARY
Discharge Note  Short Stay      SUMMARY     Admit Date: 3/7/2025    Attending Physician: Lilo Hernandez MD      Discharge Physician: Deandre Carroll MD      Discharge Date: 3/7/2025 11:57 AM    Procedure(s) (LRB):  INJECTION, BILATERAL BERTOLOTTI (Bilateral)    Final Diagnosis: Bertolotti syndrome [Q76.49]    Disposition: Home or self care    Patient Instructions:   Current Discharge Medication List        CONTINUE these medications which have NOT CHANGED    Details   APRETUDE 600 mg/3 mL (200 mg/mL) SpSR IM injection SMARTSI Syringe(s) IM      atovaquone-proguaniL (MALARONE) 250-100 mg Tab Take one tablet daily for malaria prevention. Begin one day before entering malarious area and continue for 1 week after return.  Qty: 24 tablet, Refills: 0    Associated Diagnoses: Travel advice encounter      levonorgestreL (MIRENA) 21 mcg/24 hours (8 yrs) 52 mg IUD by Intrauterine route.      LORazepam (ATIVAN) 0.5 MG tablet Take 1 tablet (0.5 mg total) by mouth once as needed for Anxiety. Take 1 tab 20-30 minutes prior to MRI; may repeat if needed  Qty: 2 tablet, Refills: 0    Associated Diagnoses: Lumbar spondylosis; Bertolotti syndrome      perfluorohexyloctane, PF, 100 % Drop Place 1 drop into both eyes 4 (four) times daily.  Qty: 3 mL, Refills: 6    Associated Diagnoses: Dry eye syndrome, bilateral      pimecrolimus (ELIDEL) 1 % cream Apply to affected areas of face TWICE DAILY as needed for irritation.  Qty: 30 g, Refills: 1    Comments: Tried and failed topical hydrocortisone cream. Condition worsens with steroids.  Associated Diagnoses: Dermatitis      scopolamine (TRANSDERM-SCOP) 1.3-1.5 mg (1 mg over 3 days) Place 1 patch onto the skin every 72 hours.  Qty: 10 patch, Refills: 1    Associated Diagnoses: Dizziness      valACYclovir (VALTREX) 1000 MG tablet Take 1 tablet (1,000 mg total) by mouth once daily.  Qty: 30 tablet, Refills: 11    Associated Diagnoses: HSV-1 (herpes simplex virus 1) infection                  Discharge Diagnosis: Bertolotti syndrome [Q76.49]  Condition on Discharge: Stable with no complications to procedure   Diet on Discharge: Same as before.  Activity: as per instruction sheet.  Discharge to: Home with a responsible adult.  Follow up: 2-4 weeks       Please call my office or pager at [phone] if experienced any weakness or loss of sensation, fever > 101.5, pain uncontrolled with oral medications, persistent nausea/vomiting/or diarrhea, redness or drainage from the incisions, or any other worrisome concerns. If physician on call was not reached or could not communicate with our office for any reason please go to the nearest emergency department

## 2025-03-07 NOTE — TELEPHONE ENCOUNTER
----- Message from Sheri sent at 3/6/2025  5:27 PM CST -----  Type: General Call Back Name of Caller:PTWould the patient rather a call back or a response via [facility]? [facility] Call Back Number: [phone] Information: pt called regarding injection  scheduled for 3/7/25 she wanted to know what time it was please contact pt with further information

## 2025-03-07 NOTE — PLAN OF CARE
Patient in recovery post procedure on room air and AAO x 4 in hospital stretcher side rails x2. Patient accompanied by family/ friend for discharge home. 24 gauge IV removed, catheter intact. Patient ambulated in room stand-by assist with no apparent distress noted. Discharge instructions given to patient. Patient understands instructions. Patient discharged in wheelchair by hospital transport. Follow up appointment scheduled and AVS given to patient.

## 2025-03-19 ENCOUNTER — TELEPHONE (OUTPATIENT)
Dept: NEUROSURGERY | Facility: CLINIC | Age: 40
End: 2025-03-19
Payer: COMMERCIAL

## 2025-03-19 ENCOUNTER — PATIENT MESSAGE (OUTPATIENT)
Dept: NEUROSURGERY | Facility: CLINIC | Age: 40
End: 2025-03-19
Payer: COMMERCIAL

## 2025-03-19 NOTE — TELEPHONE ENCOUNTER
[facility]. Pt verbalized understanding.

## 2025-03-24 DIAGNOSIS — C18.7 MALIGNANT NEOPLASM OF SIGMOID COLON: Primary | ICD-10-CM

## 2025-03-25 ENCOUNTER — OFFICE VISIT (OUTPATIENT)
Dept: PAIN MEDICINE | Facility: CLINIC | Age: 40
End: 2025-03-25
Payer: COMMERCIAL

## 2025-03-25 ENCOUNTER — PATIENT MESSAGE (OUTPATIENT)
Dept: PAIN MEDICINE | Facility: CLINIC | Age: 40
End: 2025-03-25
Payer: COMMERCIAL

## 2025-03-25 DIAGNOSIS — Q76.49 BERTOLOTTI SYNDROME: Primary | ICD-10-CM

## 2025-03-25 DIAGNOSIS — M47.816 LUMBAR SPONDYLOSIS: ICD-10-CM

## 2025-03-25 NOTE — PROGRESS NOTES
PCP: Glenn Vega MD    REFERRING PHYSICIAN: No ref. provider found    CHIEF COMPLAINT: Left lower back, sacroiliac region    Original HISTORY OF PRESENT ILLNESS: Flora Edmond presents to the clinic for the evaluation of the above pain. The pain started over the years ans was aggravated when she fell laying soccer years ago. 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 Pseudoarthrosis of the right aspect of L5 with the sacrum. The patient has a hx of thoracic outlet syndrome. Pt saw Dr Thibodeaux for this finding, She reviewed imaging and concerns with Dr. Cordoba, he recommended injections for the Bertolotti syndrom.    Original Pain Description:  The pain is located in the Sacroiliac region L > R and is axial in nature. The pain is described as aching and sometimes catching preventing her from moving. Exacerbating factors: Sitting, Standing, Lifting, and Getting out of bed/chair. Mitigating factors medication, laying down and massage. Symptoms interfere with daily activity. The patient feels like symptoms have been unchanged. Patient denies night fever/night sweats, urinary incontinence, bowel incontinence, significant weight loss, significant motor weakness, and loss of sensations.    Original PAIN SCORES:  Best: Pain is 3  Worst: Pain is 10  Current: Pain is 6         No data to display                INTERVAL HISTORY: (Newest visit at the bottom)   Interval History (Date):     Interval History 3/25/2025:  The patient returns to clinic today for follow up of back pain via virtual visit. She is s/p bilateral bertolotti's joint injection on 3/7/2025. She reports 50% relief of her pain. She has not had any episodes of her back locking up on her. She is currently on furlough from work, her insurance will end in April. She is scheduled for cervical imaging and EMG for possible thoracic outlet syndrome.     6 weeks of Conservative therapy:  PT:  October-Dec, 2024  Chiro: no  HEP: no      Treatments / Medications: (Ice/Heat/NSAIDS/APAP/etc):  Ibuprofen   Flexaril       Interventional Pain Procedures: (Previous injections)  3/7/2025- Bilateral Bertolotti's joint injection- 50% relief      Past Medical History:   Diagnosis Date    Allergy     seasonal    BRCA1 negative August 2023    BRCA2 negative August 2023    Cervical rib     Colon cancer July 1st    Vitamin D deficiency      Past Surgical History:   Procedure Laterality Date    COLONOSCOPY N/A 6/30/2023    Procedure: COLONOSCOPY;  Surgeon: Andriy Vargas MD;  Location: [facility] END[facility];  Service: Endoscopy;  Laterality: N/A;  Sutab  Prep instructions given to pt in clinic -    COLONOSCOPY N/A 10/12/2023    Procedure: COLONOSCOPY;  Surgeon: Alda Partida MD;  Location: [address]);  Service: Endoscopy;  Laterality: N/A;  8/21- referred to Dr. Partida/ Prep instructions to portal. AS.pt request sooner appt with the first available with any provider. r/s 10.13.23 with  at [facility]  8/30-pt to be done with Dr. Partida-MS  10/5-precall complete-MS    COLONOSCOPY, SCREENING, HIGH RISK PATIENT N/A 10/25/2024    Procedure: COLONOSCOPY, SCREENING, HIGH RISK PATIENT;  Surgeon: Alda Partida MD;  Location: [address]);  Service: Endoscopy;  Laterality: N/A;  referral carly/ prep inst sent to pt via portal / sutabs pt request/  10/17-procedure prep instructions sent via portal-[facility]  10/18-pre call complete-confirmed new arrival time 645am-new instr via portal-tb    INJECTION OF ANESTHETIC AGENT AROUND NERVE Bilateral 3/7/2025    Procedure: INJECTION, BILATERAL BERTOLOTTI;  Surgeon: Lilo Hernandez MD;  [facility];  Service: Pain Management;  Laterality: Bilateral;  2 WK F/U MIN    WISDOM TOOTH EXTRACTION  2007     Social History[1]  Family History   Problem Relation Name Age of Onset    Breast cancer Maternal Grandmother Mio 61    Hemophilia Father Micheal Edmond          Factor IX    Prostate cancer Father Micheal Edmond 72        Emi 6 or 7    Hyperlipidemia Father Micheal Edmond     Coronary artery disease Father Micheal Edmond     Heart disease Father Micheal Edmond     Thyroid disease Mother Liol Guillen     Breast cancer Mother Lilo Guillen 72        triple negative, CHEK2 VUS    Asthma Mother Lilo Guillen     No Known Problems Sister      Prostate cancer Paternal Uncle Sam Edmond 55    Prostate cancer Paternal Grandfather Ravindra Edmond 70    COPD Paternal Grandfather Ravindra Edmond     Heart disease Paternal Grandfather Ravindra Edmond     Hemophilia Other      Breast cancer Other          dx @ 60s    Colon cancer Neg Hx      Ovarian cancer Neg Hx         Review of patient's allergies indicates:   Allergen Reactions    Cat/feline products Itching       Current Outpatient Medications   Medication Sig    APRETUDE 600 mg/3 mL (200 mg/mL) SpSR IM injection SMARTSI Syringe(s) IM    atovaquone-proguaniL (MALARONE) 250-100 mg Tab Take one tablet daily for malaria prevention. Begin one day before entering malarious area and continue for 1 week after return.    levonorgestreL (MIRENA) 21 mcg/24 hours (8 yrs) 52 mg IUD by Intrauterine route.    LORazepam (ATIVAN) 0.5 MG tablet Take 1 tablet (0.5 mg total) by mouth once as needed for Anxiety. Take 1 tab 20-30 minutes prior to MRI; may repeat if needed    perfluorohexyloctane, PF, 100 % Drop Place 1 drop into both eyes 4 (four) times daily.    pimecrolimus (ELIDEL) 1 % cream Apply to affected areas of face TWICE DAILY as needed for irritation.    scopolamine (TRANSDERM-SCOP) 1.3-1.5 mg (1 mg over 3 days) Place 1 patch onto the skin every 72 hours.    valACYclovir (VALTREX) 1000 MG tablet Take 1 tablet (1,000 mg total) by mouth once daily.     Current Facility-Administered Medications   Medication    levonorgestreL (Mirena) 52 mg IUD 1 Intra Uterine Device       ROS:  GENERAL: No fever. No chills. No fatigue. Denies weight loss. Denies weight  gain.  HEENT: Denies headaches. Denies vision change. Denies eye pain. Denies double vision. Denies ear pain.   CV: Denies chest pain.   PULM: Denies of shortness of breath.  GI: Denies constipation. No diarrhea. No abdominal pain. Denies nausea. Denies vomiting. No blood in stool.  HEME: Denies bleeding problems.  : Denies urgency. No painful urination. No blood in urine.  MS: Denies joint stiffness. Denies joint swelling.  + back pain.  SKIN: Denies rash.   NEURO: Denies seizures. No weakness.  PSYCH:  Denies difficulty sleeping. No anxiety. Denies depression. No suicidal thoughts.       VITALS:   There were no vitals filed for this visit.    Exam limited due to virtual visit:  GENERAL: Well appearing, in no acute distress, alert and oriented x3.  PSYCH:  Mood and affect appropriate.    Previous physical exam:  PHYSICAL EXAM:   GENERAL: Well appearing, in no acute distress, alert and oriented x3.  PSYCH:  Mood and affect appropriate.  SKIN: Skin color, texture, turgor normal, no rashes or lesions.  HEENT:  Normocephalic, atraumatic. Cranial nerves grossly intact.  NECK: No pain to palpation over the cervical paraspinous muscles. No pain to palpation over facets. No pain with neck flexion, extension, or lateral flexion.   PULM: No evidence of respiratory difficulty, symmetric chest rise.  GI:  Non-distended  BACK: Normal range of motion. Pain to palpation over the L sacroiliac joint. No pain to palpation over facet joints. There is no pain with palpation over the sacroiliac joints bilaterally. +Milgrams.   EXTREMITIES: No deformities, edema, or skin discoloration.   MUSCULOSKELETAL: Shoulder, hip, and knee provocative maneuvers are negative. No atrophy is noted.  NEURO: Sensation is equal and appropriate bilaterally. Bilateral upper and lower extremity strength is normal and symmetric. Bilateral upper and lower extremity coordination and muscle stretch reflexes are physiologic and symmetric. Plantar response are  downgoing. Straight leg raising in the supine position is negative to radicular pain.   GAIT: normal.      LABS:      IMAGING:    MRI LUMBAR SPINE WITHOUT CONTRAST     CLINICAL HISTORY:  Low back pain, symptoms persist with > 6wks conservative treatment; Spondylosis without myelopathy or radiculopathy, lumbar region     TECHNIQUE:  Multiplanar, multisequence MR images were acquired from the thoracolumbar junction to the sacrum without the administration of contrast.     COMPARISON:  Radiograph 02/20/2025 and CT examination 01/27/2025     FINDINGS:  Alignment: Normal.     Vertebrae: 6 lumbar-  vertebral bodies as confirmed on CT examination of 01/27/2025 with count down from 1st rib-bearing vertebral body on CT chest abdomen pelvis.  This notes a lumbarized S1 vertebral body segment..     Discs: Desiccation of disc material with annular fissure L5-S1.     Cord: Normal. Conus terminates at L1-L2.     Degenerative findings:     T12-L1: There is no focal disc herniation. No significant central canal narrowing . No significant neural foraminal narrowing.     L1-L2: There is no focal disc herniation. No significant central canal narrowing . No significant neural foraminal narrowing.     L2-L3: There is no focal disc herniation. No significant central canal narrowing . No significant neural foraminal narrowing.     L3-L4: There is no focal disc herniation. No significant central canal narrowing . No significant neural foraminal narrowing.     L4-L5: There is no focal disc herniation. No significant central canal narrowing . No significant neural foraminal narrowing.     L5-S1: There is no focal disc herniation.Mild facet hypertrophy.  No significant central canal narrowing . No significant neural foraminal narrowing.     Paraspinal muscles & soft tissues: Unremarkable.     Impression:     Type 1 Modic changes L5-S1 with desiccation of disc material and annular fissure.  No focal protrusion or extrusion of disc material or  significant central/foraminal stenosis.     Lumbarized S1 segment based on count down from 1st rib-bearing vertebral body CT chest abdomen pelvis 01/27/2025     Please note there is a transition type lumbosacral segment with nomenclature as above.  Correlation with imaging suggested prior to any intervention.    XR SCOLIOSIS COMPLETE 2/6/25     CLINICAL HISTORY:  Other congenital malformations of spine, not associated with scoliosis     TECHNIQUE:  Frontal and lateral radiographs of the spine     COMPARISON:  None     FINDINGS:  There may be slight rightward curvature of the spine.  Bilateral cervical ribs at C7.  Pseudoarticulation between L5 and the sacrum on the right.     Vertebral body heights are maintained.     Clear lungs.  Nonobstructive bowel gas pattern.  Intrauterine device projects over the pelvis.     Impression:     Please see above.       XR LUMBAR SPINE 5 VIEW WITH FLEX AND EXT 2/6/25     CLINICAL HISTORY:  Spondylosis without myelopathy or radiculopathy, lumbar region     TECHNIQUE:  AP, lateral, and oblique images are performed through the lumbar spine.     COMPARISON:  None     FINDINGS:  Lumbar vertebral body heights are maintained.     Mild disc space narrowing L4-5 and L5-S1.  Pseudoarticulation between L5 and the sacrum on the right.     AP alignment is anatomic.     Intrauterine device projects over the pelvis.     Impression:     No acute osseous abnormality seen.      ASSESSMENT: 39 y.o. year old female with pain, consistent with:    Encounter Diagnoses   Name Primary?    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 She comes to us with Left low back pain which is worst with sit to stand, and bending.  MRI shows significant DDD at L5/S1 with an annular tear and pseudoarthrosis of the right aspect of L5 with the sacrum. Exam shows positive Milgram's test. I suspect this is a combination of her worsening DDD  and Hira's.     PLAN:    - Previous imaging reviewed.     - She is s/p Hira's joint injection with benefit.     - Continue home exercise routine.     - RTC PRN.     The above plan and management options were discussed at length with patient. Patient is in agreement with the above and verbalized understanding.     Monique Cárdenas  03/25/2025            [1]   Social History  Socioeconomic History    Marital status: Single   Tobacco Use    Smoking status: Former     Types: Vaping with nicotine    Smokeless tobacco: Never    Tobacco comments:     Stop smoking 2019   Substance and Sexual Activity    Alcohol use: Yes     Alcohol/week: 5.0 standard drinks of alcohol     Types: 5 Glasses of wine per week     Comment: social    Drug use: No    Sexual activity: Yes     Partners: Male     Birth control/protection: Condom, I.U.D.   Other Topics Concern    Are you pregnant or think you may be? 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    Originally from [place]    Moved to [facility] in 2013, lives alone         Social Drivers of Health     Financial Resource Strain: High Risk (2/24/2025)    [facility]    Overall Financial Resource Strain (CARDIA)     Difficulty of Paying Living Expenses: Hard   Food Insecurity: No Food Insecurity (2/24/2025)    [facility]    Hunger Vital Sign     Worried About Running Out of Food in the Last Year: Never true     Ran Out of Food in the Last Year: Never true   Transportation Needs: No Transportation Needs (2/24/2025)    [facility]    PRAPARE - Transportation     Lack of Transportation (Medical): No     Lack of Transportation (Non-Medical): No   Physical Activity: Insufficiently Active (2/24/2025)    [facility]    Exercise Vital Sign     Days of Exercise per Week: 1 day     Minutes of  Exercise per Session: 60 min   Stress: No Stress Concern Present (2/24/2025)    Received from [facility]    [facility] - Occupational Stress Questionnaire     Feeling of Stress : Only a little   Recent Concern: Stress - Stress Concern Present (2/14/2025)    [facility] of Occupational Health - Occupational Stress Questionnaire     Feeling of Stress : To some extent   Housing Stability: Unknown (2/24/2025)    Received from [facility]    Housing Stability Vital Sign     Unable to Pay for Housing in the Last Year: No

## 2025-04-24 ENCOUNTER — PATIENT MESSAGE (OUTPATIENT)
Dept: HEMATOLOGY/ONCOLOGY | Facility: CLINIC | Age: 40
End: 2025-04-24
Payer: COMMERCIAL

## 2025-05-08 ENCOUNTER — OFFICE VISIT (OUTPATIENT)
Dept: PRIMARY CARE CLINIC | Facility: CLINIC | Age: 40
End: 2025-05-08
Payer: COMMERCIAL

## 2025-05-08 DIAGNOSIS — F40.243 FEAR OF FLYING: Primary | ICD-10-CM

## 2025-05-08 RX ORDER — ALPRAZOLAM 0.5 MG/1
0.5 TABLET ORAL EVERY 8 HOURS PRN
Qty: 16 TABLET | Refills: 0 | Status: SHIPPED | OUTPATIENT
Start: 2025-05-08 | End: 2025-06-07

## 2025-05-08 NOTE — PROGRESS NOTES
The patient location is: [place]  The chief complaint leading to consultation is: Anxiety    Visit type: audiovisual    Face to Face time with patient: 10 minutes  13 minutes of total time spent on the encounter, which includes face to face time and non-face to face time preparing to see the patient (eg, review of tests), Obtaining and/or reviewing separately obtained history, Documenting clinical information in the electronic or other health record, Independently interpreting results (not separately reported) and communicating results to the patient/family/caregiver, or Care coordination (not separately reported).         Each patient to whom he or she provides medical services by telemedicine is:  (1) informed of the relationship between the physician and patient and the respective role of any other health care provider with respect to management of the patient; and (2) notified that he or she may decline to receive medical services by telemedicine and may withdraw from such care at any time.    Notes:     HPI:  Patient reports concerns for anxiety, particularly regarding upcoming flights. Had a lot of job changes recently with some stress, but less concerning.    Does drink alcohol once weekly, 1-2 drinks, up to 4.    Review of Systems   Psychiatric/Behavioral:  Negative for suicidal ideas. The patient is nervous/anxious (With flying).         Physical Exam  Constitutional:       General: She is not in acute distress.     Appearance: Normal appearance. She is not ill-appearing or diaphoretic.   HENT:      Head: Normocephalic and atraumatic.   Eyes:      General:         Right eye: No discharge.         Left eye: No discharge.      Conjunctiva/sclera: Conjunctivae normal.   Pulmonary:      Effort: Pulmonary effort is normal.   Musculoskeletal:         General: No deformity.      Cervical back: Neck supple. No rigidity.   Skin:     Coloration: Skin is not pale.   Neurological:      Mental Status: She is alert.    Psychiatric:         Mood and Affect: Mood normal.         Behavior: Behavior normal.         Thought Content: Thought content normal.         Judgment: Judgment normal.             Plan:  Fear of flying  -     ALPRAZolam (XANAX) 0.5 MG tablet; Take 1 tablet (0.5 mg total) by mouth every 8 (eight) hours as needed for Anxiety (Fear of flight). Do not drive or perform dangerous tasks while taking. Do not combine with alcohol  Dispense: 16 tablet; Refill: 0  - Side effects of medication provided today and instructed patient to not drive or perform dangerous tasks while taking    RTC PRN

## 2025-07-21 ENCOUNTER — PATIENT MESSAGE (OUTPATIENT)
Dept: HEMATOLOGY/ONCOLOGY | Facility: CLINIC | Age: 40
End: 2025-07-21

## 2025-08-04 ENCOUNTER — PATIENT MESSAGE (OUTPATIENT)
Dept: HEMATOLOGY/ONCOLOGY | Facility: CLINIC | Age: 40
End: 2025-08-04
Payer: COMMERCIAL

## 2025-08-20 ENCOUNTER — IMMUNIZATION (OUTPATIENT)
Dept: INTERNAL MEDICINE | Facility: CLINIC | Age: 40
End: 2025-08-20
Payer: COMMERCIAL

## 2025-08-20 DIAGNOSIS — Z23 NEED FOR VACCINATION: Primary | ICD-10-CM

## 2025-08-20 PROCEDURE — 90480 ADMN SARSCOV2 VAC 1/ONLY CMP: CPT | Mod: S$GLB,,, | Performed by: INTERNAL MEDICINE

## 2025-08-20 PROCEDURE — 91320 SARSCV2 VAC 30MCG TRS-SUC IM: CPT | Mod: S$GLB,,, | Performed by: INTERNAL MEDICINE

## 2025-08-25 ENCOUNTER — HOSPITAL ENCOUNTER (OUTPATIENT)
Dept: RADIOLOGY | Facility: HOSPITAL | Age: 40
Discharge: HOME | End: 2025-08-25
Attending: INTERNAL MEDICINE
Payer: COMMERCIAL

## 2025-08-25 DIAGNOSIS — C18.7 MALIGNANT NEOPLASM OF SIGMOID COLON: ICD-10-CM

## 2025-08-25 PROCEDURE — 71260 CT THORAX DX C+: CPT | Mod: 26,,, | Performed by: RADIOLOGY

## 2025-08-25 PROCEDURE — 74177 CT ABD & PELVIS W/CONTRAST: CPT | Mod: TC

## 2025-08-25 PROCEDURE — 74177 CT ABD & PELVIS W/CONTRAST: CPT | Mod: 26,,, | Performed by: RADIOLOGY

## 2025-08-25 PROCEDURE — 25500020 PHARM REV CODE 255: Performed by: INTERNAL MEDICINE

## 2025-08-25 PROCEDURE — A9698 NON-RAD CONTRAST MATERIALNOC: HCPCS | Performed by: INTERNAL MEDICINE

## 2025-08-25 RX ADMIN — IOHEXOL 75 ML: 350 INJECTION, SOLUTION INTRAVENOUS at 01:08

## 2025-08-25 RX ADMIN — IOHEXOL 1000 ML: 9 SOLUTION ORAL at 11:08

## 2025-08-27 ENCOUNTER — OFFICE VISIT (OUTPATIENT)
Dept: HEMATOLOGY/ONCOLOGY | Facility: CLINIC | Age: 40
End: 2025-08-27
Payer: COMMERCIAL

## 2025-08-27 VITALS
HEART RATE: 82 BPM | BODY MASS INDEX: 24.79 KG/M2 | SYSTOLIC BLOOD PRESSURE: 118 MMHG | HEIGHT: 65 IN | WEIGHT: 148.81 LBS | RESPIRATION RATE: 18 BRPM | OXYGEN SATURATION: 100 % | DIASTOLIC BLOOD PRESSURE: 82 MMHG

## 2025-08-27 DIAGNOSIS — C18.7 MALIGNANT NEOPLASM OF SIGMOID COLON: Primary | ICD-10-CM

## 2025-08-27 DIAGNOSIS — Z91.89 AT HIGH RISK FOR BREAST CANCER: ICD-10-CM

## 2025-08-27 DIAGNOSIS — D50.0 IRON DEFICIENCY ANEMIA DUE TO CHRONIC BLOOD LOSS: ICD-10-CM

## 2025-08-27 DIAGNOSIS — M25.551 RIGHT HIP PAIN: ICD-10-CM

## 2025-08-27 DIAGNOSIS — Z85.038 HISTORY OF COLON CANCER, STAGE II: ICD-10-CM

## 2025-08-27 PROCEDURE — 99214 OFFICE O/P EST MOD 30 MIN: CPT | Mod: S$GLB,,, | Performed by: INTERNAL MEDICINE

## 2025-08-27 PROCEDURE — 3079F DIAST BP 80-89 MM HG: CPT | Mod: CPTII,S$GLB,, | Performed by: INTERNAL MEDICINE

## 2025-08-27 PROCEDURE — 1159F MED LIST DOCD IN RCRD: CPT | Mod: CPTII,S$GLB,, | Performed by: INTERNAL MEDICINE

## 2025-08-27 PROCEDURE — 99999 PR PBB SHADOW E&M-EST. PATIENT-LVL IV: CPT | Mod: PBBFAC,,, | Performed by: INTERNAL MEDICINE

## 2025-08-27 PROCEDURE — 3074F SYST BP LT 130 MM HG: CPT | Mod: CPTII,S$GLB,, | Performed by: INTERNAL MEDICINE

## 2025-08-27 PROCEDURE — 3008F BODY MASS INDEX DOCD: CPT | Mod: CPTII,S$GLB,, | Performed by: INTERNAL MEDICINE

## 2025-08-28 DIAGNOSIS — C18.7 MALIGNANT NEOPLASM OF SIGMOID COLON: Primary | ICD-10-CM
